# Patient Record
Sex: FEMALE | Race: WHITE | NOT HISPANIC OR LATINO | Employment: OTHER | URBAN - NONMETROPOLITAN AREA
[De-identification: names, ages, dates, MRNs, and addresses within clinical notes are randomized per-mention and may not be internally consistent; named-entity substitution may affect disease eponyms.]

---

## 2020-02-17 ENCOUNTER — TRANSCRIBE ORDERS (OUTPATIENT)
Dept: PHYSICAL THERAPY | Facility: HOSPITAL | Age: 50
End: 2020-02-17

## 2020-02-17 DIAGNOSIS — M79.7 FIBROMYALGIA: Primary | ICD-10-CM

## 2020-02-19 ENCOUNTER — APPOINTMENT (OUTPATIENT)
Dept: PHYSICAL THERAPY | Facility: HOSPITAL | Age: 50
End: 2020-02-19

## 2020-03-04 ENCOUNTER — TRANSCRIBE ORDERS (OUTPATIENT)
Dept: PHYSICAL THERAPY | Facility: HOSPITAL | Age: 50
End: 2020-03-04

## 2020-03-04 DIAGNOSIS — M79.7 MUSCLE PAIN, FIBROMYALGIA: Primary | ICD-10-CM

## 2020-08-30 ENCOUNTER — APPOINTMENT (OUTPATIENT)
Dept: GENERAL RADIOLOGY | Facility: HOSPITAL | Age: 50
End: 2020-08-30

## 2020-08-30 ENCOUNTER — HOSPITAL ENCOUNTER (OUTPATIENT)
Facility: HOSPITAL | Age: 50
Setting detail: OBSERVATION
Discharge: HOME | End: 2020-08-31
Attending: FAMILY MEDICINE | Admitting: HOSPITALIST

## 2020-08-30 DIAGNOSIS — I50.9 CONGESTIVE HEART FAILURE, UNSPECIFIED HF CHRONICITY, UNSPECIFIED HEART FAILURE TYPE (HCC): ICD-10-CM

## 2020-08-30 DIAGNOSIS — R07.2 PRECORDIAL PAIN: Primary | ICD-10-CM

## 2020-08-30 PROBLEM — R07.9 CHEST PAIN WITH HIGH RISK OF ACUTE CORONARY SYNDROME: Status: ACTIVE | Noted: 2020-08-30

## 2020-08-30 PROBLEM — F32.A DEPRESSION: Status: ACTIVE | Noted: 2020-08-30

## 2020-08-30 PROBLEM — Z95.2 H/O AORTIC VALVE REPLACEMENT: Status: ACTIVE | Noted: 2020-08-30

## 2020-08-30 PROBLEM — M79.7 FIBROMYALGIA: Status: ACTIVE | Noted: 2020-08-30

## 2020-08-30 LAB
ALBUMIN SERPL-MCNC: 4.1 G/DL (ref 3.5–5.2)
ALBUMIN/GLOB SERPL: 1.6 G/DL
ALP SERPL-CCNC: 88 U/L (ref 39–117)
ALT SERPL W P-5'-P-CCNC: 17 U/L (ref 1–33)
ANION GAP SERPL CALCULATED.3IONS-SCNC: 14 MMOL/L (ref 5–15)
AST SERPL-CCNC: 19 U/L (ref 1–32)
BASOPHILS # BLD AUTO: 0.01 10*3/MM3 (ref 0–0.2)
BASOPHILS NFR BLD AUTO: 0.2 % (ref 0–1.5)
BILIRUB SERPL-MCNC: 1 MG/DL (ref 0–1.2)
BUN SERPL-MCNC: 21 MG/DL (ref 6–20)
BUN/CREAT SERPL: 19.6 (ref 7–25)
CALCIUM SPEC-SCNC: 8.6 MG/DL (ref 8.6–10.5)
CHLORIDE SERPL-SCNC: 99 MMOL/L (ref 98–107)
CHOLEST SERPL-MCNC: 89 MG/DL (ref 0–200)
CK SERPL-CCNC: 97 U/L (ref 20–180)
CO2 SERPL-SCNC: 27 MMOL/L (ref 22–29)
CREAT SERPL-MCNC: 1.07 MG/DL (ref 0.57–1)
DEPRECATED RDW RBC AUTO: 45.8 FL (ref 37–54)
EOSINOPHIL # BLD AUTO: 0.06 10*3/MM3 (ref 0–0.4)
EOSINOPHIL NFR BLD AUTO: 1.1 % (ref 0.3–6.2)
ERYTHROCYTE [DISTWIDTH] IN BLOOD BY AUTOMATED COUNT: 13.6 % (ref 12.3–15.4)
GFR SERPL CREATININE-BSD FRML MDRD: 55 ML/MIN/1.73
GLOBULIN UR ELPH-MCNC: 2.6 GM/DL
GLUCOSE SERPL-MCNC: 115 MG/DL (ref 65–99)
HCT VFR BLD AUTO: 39.1 % (ref 34–46.6)
HDLC SERPL-MCNC: 52 MG/DL (ref 40–60)
HGB BLD-MCNC: 12.6 G/DL (ref 12–15.9)
HOLD SPECIMEN: NORMAL
HOLD SPECIMEN: NORMAL
IMM GRANULOCYTES # BLD AUTO: 0.02 10*3/MM3 (ref 0–0.05)
IMM GRANULOCYTES NFR BLD AUTO: 0.4 % (ref 0–0.5)
INR PPP: 3.92 (ref 0.8–1.2)
LDLC SERPL CALC-MCNC: 16 MG/DL (ref 0–100)
LDLC/HDLC SERPL: 0.3 {RATIO}
LIPASE SERPL-CCNC: 31 U/L (ref 13–60)
LYMPHOCYTES # BLD AUTO: 0.98 10*3/MM3 (ref 0.7–3.1)
LYMPHOCYTES NFR BLD AUTO: 18.4 % (ref 19.6–45.3)
MAGNESIUM SERPL-MCNC: 1.6 MG/DL (ref 1.6–2.6)
MAGNESIUM SERPL-MCNC: 1.7 MG/DL (ref 1.6–2.6)
MCH RBC QN AUTO: 29.8 PG (ref 26.6–33)
MCHC RBC AUTO-ENTMCNC: 32.2 G/DL (ref 31.5–35.7)
MCV RBC AUTO: 92.4 FL (ref 79–97)
MONOCYTES # BLD AUTO: 0.43 10*3/MM3 (ref 0.1–0.9)
MONOCYTES NFR BLD AUTO: 8.1 % (ref 5–12)
NEUTROPHILS NFR BLD AUTO: 3.82 10*3/MM3 (ref 1.7–7)
NEUTROPHILS NFR BLD AUTO: 71.8 % (ref 42.7–76)
NRBC BLD AUTO-RTO: 0 /100 WBC (ref 0–0.2)
NT-PROBNP SERPL-MCNC: 2413 PG/ML (ref 0–450)
PLATELET # BLD AUTO: 195 10*3/MM3 (ref 140–450)
PMV BLD AUTO: 11.4 FL (ref 6–12)
POTASSIUM SERPL-SCNC: 3.1 MMOL/L (ref 3.5–5.2)
PROT SERPL-MCNC: 6.7 G/DL (ref 6–8.5)
PROTHROMBIN TIME: 41.3 SECONDS (ref 11.1–15.3)
RBC # BLD AUTO: 4.23 10*6/MM3 (ref 3.77–5.28)
SODIUM SERPL-SCNC: 140 MMOL/L (ref 136–145)
TRIGL SERPL-MCNC: 106 MG/DL (ref 0–150)
TROPONIN T SERPL-MCNC: <0.01 NG/ML (ref 0–0.03)
VLDLC SERPL-MCNC: 21.2 MG/DL
WBC # BLD AUTO: 5.32 10*3/MM3 (ref 3.4–10.8)
WHOLE BLOOD HOLD SPECIMEN: NORMAL
WHOLE BLOOD HOLD SPECIMEN: NORMAL

## 2020-08-30 PROCEDURE — 83690 ASSAY OF LIPASE: CPT | Performed by: FAMILY MEDICINE

## 2020-08-30 PROCEDURE — 84484 ASSAY OF TROPONIN QUANT: CPT | Performed by: HOSPITALIST

## 2020-08-30 PROCEDURE — 93005 ELECTROCARDIOGRAM TRACING: CPT | Performed by: FAMILY MEDICINE

## 2020-08-30 PROCEDURE — 83735 ASSAY OF MAGNESIUM: CPT | Performed by: FAMILY MEDICINE

## 2020-08-30 PROCEDURE — G0378 HOSPITAL OBSERVATION PER HR: HCPCS

## 2020-08-30 PROCEDURE — 93010 ELECTROCARDIOGRAM REPORT: CPT | Performed by: INTERNAL MEDICINE

## 2020-08-30 PROCEDURE — 99285 EMERGENCY DEPT VISIT HI MDM: CPT

## 2020-08-30 PROCEDURE — 71045 X-RAY EXAM CHEST 1 VIEW: CPT

## 2020-08-30 PROCEDURE — 85025 COMPLETE CBC W/AUTO DIFF WBC: CPT | Performed by: FAMILY MEDICINE

## 2020-08-30 PROCEDURE — 82962 GLUCOSE BLOOD TEST: CPT

## 2020-08-30 PROCEDURE — 83880 ASSAY OF NATRIURETIC PEPTIDE: CPT | Performed by: FAMILY MEDICINE

## 2020-08-30 PROCEDURE — 80061 LIPID PANEL: CPT | Performed by: STUDENT IN AN ORGANIZED HEALTH CARE EDUCATION/TRAINING PROGRAM

## 2020-08-30 PROCEDURE — 83735 ASSAY OF MAGNESIUM: CPT | Performed by: STUDENT IN AN ORGANIZED HEALTH CARE EDUCATION/TRAINING PROGRAM

## 2020-08-30 PROCEDURE — 85610 PROTHROMBIN TIME: CPT | Performed by: FAMILY MEDICINE

## 2020-08-30 PROCEDURE — 80053 COMPREHEN METABOLIC PANEL: CPT | Performed by: FAMILY MEDICINE

## 2020-08-30 PROCEDURE — 87040 BLOOD CULTURE FOR BACTERIA: CPT | Performed by: FAMILY MEDICINE

## 2020-08-30 PROCEDURE — 82550 ASSAY OF CK (CPK): CPT | Performed by: FAMILY MEDICINE

## 2020-08-30 PROCEDURE — 84484 ASSAY OF TROPONIN QUANT: CPT | Performed by: FAMILY MEDICINE

## 2020-08-30 RX ORDER — SUCRALFATE 1 G/1
1 TABLET ORAL 4 TIMES DAILY
COMMUNITY

## 2020-08-30 RX ORDER — ASCORBIC ACID 500 MG
500 TABLET ORAL DAILY
COMMUNITY

## 2020-08-30 RX ORDER — SPIRONOLACTONE AND HYDROCHLOROTHIAZIDE 25; 25 MG/1; MG/1
1 TABLET ORAL DAILY
COMMUNITY

## 2020-08-30 RX ORDER — HYDROCHLOROTHIAZIDE 25 MG/1
25 TABLET ORAL DAILY
Status: DISCONTINUED | OUTPATIENT
Start: 2020-08-31 | End: 2020-08-31 | Stop reason: HOSPADM

## 2020-08-30 RX ORDER — PREGABALIN 25 MG/1
25 CAPSULE ORAL EVERY 12 HOURS SCHEDULED
Status: DISCONTINUED | OUTPATIENT
Start: 2020-08-30 | End: 2020-08-31 | Stop reason: HOSPADM

## 2020-08-30 RX ORDER — DIGOXIN 250 MCG
250 TABLET ORAL
Status: DISCONTINUED | OUTPATIENT
Start: 2020-08-31 | End: 2020-08-31 | Stop reason: HOSPADM

## 2020-08-30 RX ORDER — TIZANIDINE 4 MG/1
4 TABLET ORAL EVERY 8 HOURS PRN
Status: DISCONTINUED | OUTPATIENT
Start: 2020-08-30 | End: 2020-08-31 | Stop reason: HOSPADM

## 2020-08-30 RX ORDER — TIZANIDINE 4 MG/1
4 TABLET ORAL EVERY 8 HOURS PRN
COMMUNITY

## 2020-08-30 RX ORDER — ERGOCALCIFEROL (VITAMIN D2) 10 MCG
400 TABLET ORAL DAILY
Status: ON HOLD | COMMUNITY
End: 2020-08-30

## 2020-08-30 RX ORDER — DIGOXIN 0.05 MG/ML
5 SOLUTION ORAL
Status: ON HOLD | COMMUNITY
End: 2020-08-30

## 2020-08-30 RX ORDER — DULOXETIN HYDROCHLORIDE 60 MG/1
60 CAPSULE, DELAYED RELEASE ORAL 2 TIMES DAILY
Status: DISCONTINUED | OUTPATIENT
Start: 2020-08-30 | End: 2020-08-31 | Stop reason: HOSPADM

## 2020-08-30 RX ORDER — PANTOPRAZOLE SODIUM 40 MG/1
40 TABLET, DELAYED RELEASE ORAL DAILY
COMMUNITY

## 2020-08-30 RX ORDER — ONDANSETRON 4 MG/1
4 TABLET, FILM COATED ORAL EVERY 6 HOURS PRN
Status: DISCONTINUED | OUTPATIENT
Start: 2020-08-30 | End: 2020-08-31 | Stop reason: HOSPADM

## 2020-08-30 RX ORDER — SPIRONOLACTONE 25 MG/1
25 TABLET ORAL DAILY
Status: DISCONTINUED | OUTPATIENT
Start: 2020-08-31 | End: 2020-08-31 | Stop reason: HOSPADM

## 2020-08-30 RX ORDER — SUCRALFATE 1 G/1
1 TABLET ORAL 4 TIMES DAILY
Status: DISCONTINUED | OUTPATIENT
Start: 2020-08-30 | End: 2020-08-31 | Stop reason: HOSPADM

## 2020-08-30 RX ORDER — PHENOL 1.4 %
600 AEROSOL, SPRAY (ML) MUCOUS MEMBRANE DAILY
COMMUNITY

## 2020-08-30 RX ORDER — SODIUM CHLORIDE 0.9 % (FLUSH) 0.9 %
10 SYRINGE (ML) INJECTION AS NEEDED
Status: DISCONTINUED | OUTPATIENT
Start: 2020-08-30 | End: 2020-08-31 | Stop reason: HOSPADM

## 2020-08-30 RX ORDER — PREGABALIN 25 MG/1
25 CAPSULE ORAL 2 TIMES DAILY
COMMUNITY

## 2020-08-30 RX ORDER — DIGOXIN 250 MCG
250 TABLET ORAL
COMMUNITY

## 2020-08-30 RX ORDER — PANTOPRAZOLE SODIUM 40 MG/1
40 TABLET, DELAYED RELEASE ORAL DAILY
Status: DISCONTINUED | OUTPATIENT
Start: 2020-08-31 | End: 2020-08-31 | Stop reason: HOSPADM

## 2020-08-30 RX ORDER — LEVOFLOXACIN 5 MG/ML
750 INJECTION, SOLUTION INTRAVENOUS ONCE
Status: DISCONTINUED | OUTPATIENT
Start: 2020-08-30 | End: 2020-08-30

## 2020-08-30 RX ORDER — POTASSIUM CHLORIDE 750 MG/1
10 CAPSULE, EXTENDED RELEASE ORAL DAILY
Status: DISCONTINUED | OUTPATIENT
Start: 2020-08-31 | End: 2020-08-31

## 2020-08-30 RX ORDER — SODIUM CHLORIDE 0.9 % (FLUSH) 0.9 %
10 SYRINGE (ML) INJECTION EVERY 12 HOURS SCHEDULED
Status: DISCONTINUED | OUTPATIENT
Start: 2020-08-30 | End: 2020-08-31 | Stop reason: HOSPADM

## 2020-08-30 RX ORDER — DULOXETIN HYDROCHLORIDE 60 MG/1
60 CAPSULE, DELAYED RELEASE ORAL 2 TIMES DAILY
COMMUNITY

## 2020-08-30 RX ORDER — METOPROLOL SUCCINATE 25 MG/1
25 TABLET, EXTENDED RELEASE ORAL DAILY
Status: DISCONTINUED | OUTPATIENT
Start: 2020-08-31 | End: 2020-08-31 | Stop reason: HOSPADM

## 2020-08-30 RX ORDER — WARFARIN SODIUM 6 MG/1
6 TABLET ORAL
COMMUNITY

## 2020-08-30 RX ORDER — POTASSIUM CHLORIDE 750 MG/1
10 CAPSULE, EXTENDED RELEASE ORAL DAILY
COMMUNITY

## 2020-08-30 RX ORDER — METOPROLOL SUCCINATE 25 MG/1
25 TABLET, EXTENDED RELEASE ORAL DAILY
COMMUNITY

## 2020-08-30 RX ORDER — CALCIUM CARBONATE 200(500)MG
2 TABLET,CHEWABLE ORAL 2 TIMES DAILY PRN
Status: DISCONTINUED | OUTPATIENT
Start: 2020-08-30 | End: 2020-08-31 | Stop reason: HOSPADM

## 2020-08-30 RX ORDER — NITROGLYCERIN 0.4 MG/1
0.4 TABLET SUBLINGUAL
COMMUNITY

## 2020-08-30 RX ORDER — NITROGLYCERIN 0.4 MG/1
0.4 TABLET SUBLINGUAL
Status: DISCONTINUED | OUTPATIENT
Start: 2020-08-30 | End: 2020-08-31 | Stop reason: HOSPADM

## 2020-08-30 RX ORDER — LIDOCAINE 50 MG/G
1 PATCH TOPICAL EVERY 24 HOURS
COMMUNITY

## 2020-08-30 RX ORDER — ACETAMINOPHEN 325 MG/1
650 TABLET ORAL EVERY 4 HOURS PRN
Status: DISCONTINUED | OUTPATIENT
Start: 2020-08-30 | End: 2020-08-31 | Stop reason: HOSPADM

## 2020-08-30 RX ADMIN — DULOXETINE HYDROCHLORIDE 60 MG: 60 CAPSULE, DELAYED RELEASE ORAL at 22:10

## 2020-08-30 RX ADMIN — SUCRALFATE 1 G: 1 TABLET ORAL at 22:11

## 2020-08-30 RX ADMIN — SODIUM CHLORIDE, PRESERVATIVE FREE 10 ML: 5 INJECTION INTRAVENOUS at 22:11

## 2020-08-30 RX ADMIN — PREGABALIN 25 MG: 25 CAPSULE ORAL at 22:11

## 2020-08-31 ENCOUNTER — APPOINTMENT (OUTPATIENT)
Dept: CT IMAGING | Facility: HOSPITAL | Age: 50
End: 2020-08-31

## 2020-08-31 VITALS
WEIGHT: 199.6 LBS | TEMPERATURE: 97.2 F | RESPIRATION RATE: 16 BRPM | HEIGHT: 63 IN | BODY MASS INDEX: 35.37 KG/M2 | OXYGEN SATURATION: 98 % | HEART RATE: 62 BPM | SYSTOLIC BLOOD PRESSURE: 127 MMHG | DIASTOLIC BLOOD PRESSURE: 72 MMHG

## 2020-08-31 PROBLEM — E87.6 HYPOKALEMIA: Status: ACTIVE | Noted: 2020-08-31

## 2020-08-31 PROBLEM — R07.9 CHEST PAIN WITH HIGH RISK OF ACUTE CORONARY SYNDROME: Status: RESOLVED | Noted: 2020-08-30 | Resolved: 2020-08-31

## 2020-08-31 PROBLEM — E87.6 HYPOKALEMIA: Status: RESOLVED | Noted: 2020-08-31 | Resolved: 2020-08-31

## 2020-08-31 LAB
ALBUMIN SERPL-MCNC: 3.8 G/DL (ref 3.5–5.2)
ALBUMIN/GLOB SERPL: 1.4 G/DL
ALP SERPL-CCNC: 85 U/L (ref 39–117)
ALT SERPL W P-5'-P-CCNC: 15 U/L (ref 1–33)
ANION GAP SERPL CALCULATED.3IONS-SCNC: 11 MMOL/L (ref 5–15)
ANION GAP SERPL CALCULATED.3IONS-SCNC: 12 MMOL/L (ref 5–15)
AST SERPL-CCNC: 16 U/L (ref 1–32)
BASOPHILS # BLD AUTO: 0.01 10*3/MM3 (ref 0–0.2)
BASOPHILS NFR BLD AUTO: 0.2 % (ref 0–1.5)
BILIRUB SERPL-MCNC: 0.9 MG/DL (ref 0–1.2)
BUN SERPL-MCNC: 16 MG/DL (ref 6–20)
BUN SERPL-MCNC: 19 MG/DL (ref 6–20)
BUN/CREAT SERPL: 17.8 (ref 7–25)
BUN/CREAT SERPL: 22.1 (ref 7–25)
CALCIUM SPEC-SCNC: 9.1 MG/DL (ref 8.6–10.5)
CALCIUM SPEC-SCNC: 9.8 MG/DL (ref 8.6–10.5)
CHLORIDE SERPL-SCNC: 101 MMOL/L (ref 98–107)
CHLORIDE SERPL-SCNC: 105 MMOL/L (ref 98–107)
CO2 SERPL-SCNC: 27 MMOL/L (ref 22–29)
CO2 SERPL-SCNC: 27 MMOL/L (ref 22–29)
CREAT SERPL-MCNC: 0.86 MG/DL (ref 0.57–1)
CREAT SERPL-MCNC: 0.9 MG/DL (ref 0.57–1)
DEPRECATED RDW RBC AUTO: 46 FL (ref 37–54)
EOSINOPHIL # BLD AUTO: 0.06 10*3/MM3 (ref 0–0.4)
EOSINOPHIL NFR BLD AUTO: 1.1 % (ref 0.3–6.2)
ERYTHROCYTE [DISTWIDTH] IN BLOOD BY AUTOMATED COUNT: 13.5 % (ref 12.3–15.4)
GFR SERPL CREATININE-BSD FRML MDRD: 67 ML/MIN/1.73
GFR SERPL CREATININE-BSD FRML MDRD: 70 ML/MIN/1.73
GLOBULIN UR ELPH-MCNC: 2.7 GM/DL
GLUCOSE BLDC GLUCOMTR-MCNC: 126 MG/DL (ref 70–130)
GLUCOSE BLDC GLUCOMTR-MCNC: 90 MG/DL (ref 70–130)
GLUCOSE SERPL-MCNC: 53 MG/DL (ref 65–99)
GLUCOSE SERPL-MCNC: 93 MG/DL (ref 65–99)
HCT VFR BLD AUTO: 39 % (ref 34–46.6)
HGB BLD-MCNC: 12.5 G/DL (ref 12–15.9)
IMM GRANULOCYTES # BLD AUTO: 0.01 10*3/MM3 (ref 0–0.05)
IMM GRANULOCYTES NFR BLD AUTO: 0.2 % (ref 0–0.5)
LYMPHOCYTES # BLD AUTO: 1.48 10*3/MM3 (ref 0.7–3.1)
LYMPHOCYTES NFR BLD AUTO: 26.7 % (ref 19.6–45.3)
MCH RBC QN AUTO: 29.8 PG (ref 26.6–33)
MCHC RBC AUTO-ENTMCNC: 32.1 G/DL (ref 31.5–35.7)
MCV RBC AUTO: 93.1 FL (ref 79–97)
MONOCYTES # BLD AUTO: 0.43 10*3/MM3 (ref 0.1–0.9)
MONOCYTES NFR BLD AUTO: 7.7 % (ref 5–12)
NEUTROPHILS NFR BLD AUTO: 3.56 10*3/MM3 (ref 1.7–7)
NEUTROPHILS NFR BLD AUTO: 64.1 % (ref 42.7–76)
NRBC BLD AUTO-RTO: 0 /100 WBC (ref 0–0.2)
PLATELET # BLD AUTO: 186 10*3/MM3 (ref 140–450)
PMV BLD AUTO: 11.4 FL (ref 6–12)
POTASSIUM SERPL-SCNC: 3 MMOL/L (ref 3.5–5.2)
POTASSIUM SERPL-SCNC: 3.5 MMOL/L (ref 3.5–5.2)
PROT SERPL-MCNC: 6.5 G/DL (ref 6–8.5)
RBC # BLD AUTO: 4.19 10*6/MM3 (ref 3.77–5.28)
SODIUM SERPL-SCNC: 139 MMOL/L (ref 136–145)
SODIUM SERPL-SCNC: 144 MMOL/L (ref 136–145)
TROPONIN T SERPL-MCNC: <0.01 NG/ML (ref 0–0.03)
WBC # BLD AUTO: 5.55 10*3/MM3 (ref 3.4–10.8)

## 2020-08-31 PROCEDURE — G0378 HOSPITAL OBSERVATION PER HR: HCPCS

## 2020-08-31 PROCEDURE — 0 IOPAMIDOL PER 1 ML: Performed by: STUDENT IN AN ORGANIZED HEALTH CARE EDUCATION/TRAINING PROGRAM

## 2020-08-31 PROCEDURE — 70450 CT HEAD/BRAIN W/O DYE: CPT

## 2020-08-31 PROCEDURE — 84484 ASSAY OF TROPONIN QUANT: CPT | Performed by: HOSPITALIST

## 2020-08-31 PROCEDURE — 85025 COMPLETE CBC W/AUTO DIFF WBC: CPT | Performed by: STUDENT IN AN ORGANIZED HEALTH CARE EDUCATION/TRAINING PROGRAM

## 2020-08-31 PROCEDURE — 82962 GLUCOSE BLOOD TEST: CPT

## 2020-08-31 PROCEDURE — 75574 CT ANGIO HRT W/3D IMAGE: CPT

## 2020-08-31 PROCEDURE — 99220 PR INITIAL OBSERVATION CARE/DAY 70 MINUTES: CPT | Performed by: STUDENT IN AN ORGANIZED HEALTH CARE EDUCATION/TRAINING PROGRAM

## 2020-08-31 PROCEDURE — 80053 COMPREHEN METABOLIC PANEL: CPT | Performed by: STUDENT IN AN ORGANIZED HEALTH CARE EDUCATION/TRAINING PROGRAM

## 2020-08-31 RX ORDER — POTASSIUM CHLORIDE 750 MG/1
40 CAPSULE, EXTENDED RELEASE ORAL 2 TIMES DAILY WITH MEALS
Status: DISCONTINUED | OUTPATIENT
Start: 2020-08-31 | End: 2020-08-31

## 2020-08-31 RX ORDER — POTASSIUM CHLORIDE 750 MG/1
40 CAPSULE, EXTENDED RELEASE ORAL 2 TIMES DAILY WITH MEALS
Status: DISCONTINUED | OUTPATIENT
Start: 2020-08-31 | End: 2020-08-31 | Stop reason: HOSPADM

## 2020-08-31 RX ADMIN — DIGOXIN 250 MCG: 250 TABLET ORAL at 13:44

## 2020-08-31 RX ADMIN — PANTOPRAZOLE SODIUM 40 MG: 40 TABLET, DELAYED RELEASE ORAL at 09:31

## 2020-08-31 RX ADMIN — SODIUM CHLORIDE, PRESERVATIVE FREE 10 ML: 5 INJECTION INTRAVENOUS at 09:31

## 2020-08-31 RX ADMIN — PREGABALIN 25 MG: 25 CAPSULE ORAL at 09:31

## 2020-08-31 RX ADMIN — SUCRALFATE 1 G: 1 TABLET ORAL at 17:16

## 2020-08-31 RX ADMIN — DULOXETINE HYDROCHLORIDE 60 MG: 60 CAPSULE, DELAYED RELEASE ORAL at 09:31

## 2020-08-31 RX ADMIN — SUCRALFATE 1 G: 1 TABLET ORAL at 13:44

## 2020-08-31 RX ADMIN — SUCRALFATE 1 G: 1 TABLET ORAL at 09:30

## 2020-08-31 RX ADMIN — IOPAMIDOL 90 ML: 755 INJECTION, SOLUTION INTRAVENOUS at 09:17

## 2020-08-31 RX ADMIN — POTASSIUM CHLORIDE 40 MEQ: 10 CAPSULE, COATED, EXTENDED RELEASE ORAL at 17:16

## 2020-08-31 RX ADMIN — METOPROLOL SUCCINATE 25 MG: 25 TABLET, FILM COATED, EXTENDED RELEASE ORAL at 09:31

## 2020-08-31 RX ADMIN — POTASSIUM CHLORIDE 10 MEQ: 10 CAPSULE, COATED, EXTENDED RELEASE ORAL at 09:31

## 2020-08-31 RX ADMIN — SPIRONOLACTONE 25 MG: 25 TABLET ORAL at 09:31

## 2020-08-31 RX ADMIN — HYDROCHLOROTHIAZIDE 25 MG: 25 TABLET ORAL at 09:31

## 2020-09-01 ENCOUNTER — READMISSION MANAGEMENT (OUTPATIENT)
Dept: CALL CENTER | Facility: HOSPITAL | Age: 50
End: 2020-09-01

## 2020-09-01 ENCOUNTER — PATIENT OUTREACH (OUTPATIENT)
Dept: FAMILY MEDICINE CLINIC | Facility: CLINIC | Age: 50
End: 2020-09-01

## 2020-09-01 NOTE — DISCHARGE SUMMARY
DISCHARGE SUMMARY    PATIENT NAME: Wing Hickman       PHYSICIAN: William Alfaro MD  : 1970  MRN: [id number]    ADMITTED: 2020     DISCHARGED: 2020     ADMISSION DIAGNOSES:  Active Hospital Problems    Diagnosis  POA   • Congestive heart failure (CHF) (CMS/HCC) [I50.9]  Yes   • Fibromyalgia [M79.7]  Yes   • H/O aortic valve replacement [Z95.2]  Not Applicable   • Depression [F32.9]  Yes      Resolved Hospital Problems    Diagnosis Date Resolved POA   • **Chest pain with high risk of acute coronary syndrome [R07.9] 2020 Yes   • Hypokalemia [E87.6] 2020 Yes     DISCHARGE DIAGNOSES:   Active Hospital Problems    Diagnosis  POA   • Congestive heart failure (CHF) (CMS/HCC) [I50.9]  Yes   • Fibromyalgia [M79.7]  Yes   • H/O aortic valve replacement [Z95.2]  Not Applicable   • Depression [F32.9]  Yes      Resolved Hospital Problems    Diagnosis Date Resolved POA   • **Chest pain with high risk of acute coronary syndrome [R07.9] 2020 Yes   • Hypokalemia [E87.6] 2020 Yes       SERVICE: Family Medicine Residency  Attending: Senthil Melendez MD  Resident: William Alfaro MD    CONSULTS:   Consult Orders (all) (From admission, onward)     Start     Ordered    20  Inpatient Consult to Advance Care Planning  Once     Provider:  (Not yet assigned)    20  Family Practice - Resident (on-call MD unless specified)  Once,   Status:  Canceled     Specialty:  Family Medicine  Provider:  Anastasia Barone MD    20                PROCEDURES:   None     HISTORY OF PRESENT ILLNESS:   Taken from Dr. Barone's H&P 2020     Patient is a 50 y.o. female presented with a CMH of chf, depression, aortic valve replacement, tricuspid valve replacement, h/o gastric bypass surgery who presents complaining of chest pain. Patient was cooking this morning when her chest started hurting. She states it was like someone was stabbing her. She states the  pain  Radiated to her neck, face, left shoulder. Associated symptoms include nausea and shortness of air. Patient took a nitro and it didn't help. Patient was given nitro in the ambulance and It did help with the pain. Patient states she still has that pressure like pain. She states its a 4/10. Has not had pain like this before.        DIAGNOSTIC DATA:   Lab Results (last 48 hours)     Procedure Component Value Units Date/Time    Basic Metabolic Panel [[id number]]  (Abnormal) Collected:  08/31/20 1515    Specimen:  Blood Updated:  08/31/20 1550     Glucose 53 mg/dL      BUN 16 mg/dL      Creatinine 0.90 mg/dL      Sodium 139 mmol/L      Potassium 3.5 mmol/L      Chloride 101 mmol/L      CO2 27.0 mmol/L      Calcium 9.8 mg/dL      eGFR Non African Amer 67 mL/min/1.73      BUN/Creatinine Ratio 17.8     Anion Gap 11.0 mmol/L     Narrative:       GFR Normal >60  Chronic Kidney Disease <60  Kidney Failure <15      POC Glucose Once [[id number]]  (Normal) Collected:  08/31/20 0602    Specimen:  Blood Updated:  08/31/20 0650     Glucose 90 mg/dL      Comment: : [id number] JIGAR CRAIGMeter ID: [id number]       Troponin [[id number]]  (Normal) Collected:  08/31/20 0150    Specimen:  Blood Updated:  08/31/20 0243     Troponin T <0.010 ng/mL     Narrative:       Troponin T Reference Range:  <= 0.03 ng/mL-   Negative for AMI  >0.03 ng/mL-     Abnormal for myocardial necrosis.  Clinicians would have to utilize clinical acumen, EKG, Troponin and serial changes to determine if it is an Acute Myocardial Infarction or myocardial injury due to an underlying chronic condition.       Results may be falsely decreased if patient taking Biotin.      Comprehensive Metabolic Panel [[id number]]  (Abnormal) Collected:  08/31/20 0150    Specimen:  Blood Updated:  08/31/20 0227     Glucose 93 mg/dL      BUN 19 mg/dL      Creatinine 0.86 mg/dL      Sodium 144 mmol/L      Potassium 3.0 mmol/L      Chloride 105 mmol/L      CO2 27.0  mmol/L      Calcium 9.1 mg/dL      Total Protein 6.5 g/dL      Albumin 3.80 g/dL      ALT (SGPT) 15 U/L      AST (SGOT) 16 U/L      Alkaline Phosphatase 85 U/L      Total Bilirubin 0.9 mg/dL      eGFR Non African Amer 70 mL/min/1.73      Globulin 2.7 gm/dL      A/G Ratio 1.4 g/dL      BUN/Creatinine Ratio 22.1     Anion Gap 12.0 mmol/L     Narrative:       GFR Normal >60  Chronic Kidney Disease <60  Kidney Failure <15      CBC & Differential [[id number]] Collected:  08/31/20 0150    Specimen:  Blood Updated:  08/31/20 0204    Narrative:       The following orders were created for panel order CBC & Differential.  Procedure                               Abnormality         Status                     ---------                               -----------         ------                     CBC Auto Differential[[id number]]        Normal              Final result                 Please view results for these tests on the individual orders.    CBC Auto Differential [[id number]]  (Normal) Collected:  08/31/20 0150    Specimen:  Blood Updated:  08/31/20 0204     WBC 5.55 10*3/mm3      RBC 4.19 10*6/mm3      Hemoglobin 12.5 g/dL      Hematocrit 39.0 %      MCV 93.1 fL      MCH 29.8 pg      MCHC 32.1 g/dL      RDW 13.5 %      RDW-SD 46.0 fl      MPV 11.4 fL      Platelets 186 10*3/mm3      Neutrophil % 64.1 %      Lymphocyte % 26.7 %      Monocyte % 7.7 %      Eosinophil % 1.1 %      Basophil % 0.2 %      Immature Grans % 0.2 %      Neutrophils, Absolute 3.56 10*3/mm3      Lymphocytes, Absolute 1.48 10*3/mm3      Monocytes, Absolute 0.43 10*3/mm3      Eosinophils, Absolute 0.06 10*3/mm3      Basophils, Absolute 0.01 10*3/mm3      Immature Grans, Absolute 0.01 10*3/mm3      nRBC 0.0 /100 WBC     POC Glucose Once [[id number]]  (Normal) Collected:  08/30/20 2027    Specimen:  Blood Updated:  08/31/20 0050     Glucose 126 mg/dL      Comment: RN NotifiedOperator: [id number] JIGAR CRAIGMeter ID: [id number]       Troponin  [[id number]]  (Normal) Collected:  08/30/20 2301    Specimen:  Blood Updated:  08/30/20 2325     Troponin T <0.010 ng/mL     Narrative:       Troponin T Reference Range:  <= 0.03 ng/mL-   Negative for AMI  >0.03 ng/mL-     Abnormal for myocardial necrosis.  Clinicians would have to utilize clinical acumen, EKG, Troponin and serial changes to determine if it is an Acute Myocardial Infarction or myocardial injury due to an underlying chronic condition.       Results may be falsely decreased if patient taking Biotin.      Lipid Panel [[id number]] Collected:  08/30/20 1933    Specimen:  Blood Updated:  08/30/20 2135     Total Cholesterol 89 mg/dL      Triglycerides 106 mg/dL      HDL Cholesterol 52 mg/dL      LDL Cholesterol  16 mg/dL      VLDL Cholesterol 21.2 mg/dL      LDL/HDL Ratio 0.30    Narrative:       Cholesterol Reference Ranges  (U.S. Department of Health and Human Services ATP III Classifications)    Desirable          <200 mg/dL  Borderline High    200-239 mg/dL  High Risk          >240 mg/dL      Triglyceride Reference Ranges  (U.S. Department of Health and Human Services ATP III Classifications)    Normal           <150 mg/dL  Borderline High  150-199 mg/dL  High             200-499 mg/dL  Very High        >500 mg/dL    HDL Reference Ranges  (U.S. Department of Health and Human Services ATP III Classifcations)    Low     <40 mg/dl (major risk factor for CHD)  High    >60 mg/dl ('negative' risk factor for CHD)        LDL Reference Ranges  (U.S. Department of Health and Human Services ATP III Classifcations)    Optimal          <100 mg/dL  Near Optimal     100-129 mg/dL  Borderline High  130-159 mg/dL  High             160-189 mg/dL  Very High        >189 mg/dL    Magnesium [[id number]]  (Normal) Collected:  08/30/20 1933    Specimen:  Blood Updated:  08/30/20 2135     Magnesium 1.7 mg/dL     Troponin [[id number]]  (Normal) Collected:  08/30/20 1933    Specimen:  Blood Updated:  08/30/20 1952     Troponin T  <0.010 ng/mL     Narrative:       Troponin T Reference Range:  <= 0.03 ng/mL-   Negative for AMI  >0.03 ng/mL-     Abnormal for myocardial necrosis.  Clinicians would have to utilize clinical acumen, EKG, Troponin and serial changes to determine if it is an Acute Myocardial Infarction or myocardial injury due to an underlying chronic condition.       Results may be falsely decreased if patient taking Biotin.      [place] Draw [[id number]] Collected:  08/30/20 1754    Specimen:  Blood Updated:  08/30/20 1900    Narrative:       The following orders were created for panel order [place] Draw.  Procedure                               Abnormality         Status                     ---------                               -----------         ------                     Light Blue Top[[id number]]                                   Final result               Green Top (Gel)[[id number]]                                  Final result               Lavender Top[[id number]]                                     Final result               Gold Top - SST[[id number]]                                   Final result                 Please view results for these tests on the individual orders.    Light Blue Top [[id number]] Collected:  08/30/20 1754    Specimen:  Blood Updated:  08/30/20 1900     Extra Tube hold for add-on     Comment: Auto resulted       Green Top (Gel) [[id number]] Collected:  08/30/20 1754    Specimen:  Blood Updated:  08/30/20 1900     Extra Tube Hold for add-ons.     Comment: Auto resulted.       Lavender Top [[id number]] Collected:  08/30/20 1754    Specimen:  Blood Updated:  08/30/20 1900     Extra Tube hold for add-on     Comment: Auto resulted       Gold Top - SST [[id number]] Collected:  08/30/20 1754    Specimen:  Blood Updated:  08/30/20 1900     Extra Tube Hold for add-ons.     Comment: Auto resulted.       Protime-INR [[id number]]  (Abnormal) Collected:  08/30/20 1754    Specimen:  Blood Updated:  08/30/20 1858      Protime 41.3 Seconds      INR 3.92    Narrative:       Therapeutic range for most indications is 2.0-3.0 INR,  or 2.5-3.5 for mechanical heart valves.    Lipase [[id number]]  (Normal) Collected:  08/30/20 1754    Specimen:  Blood Updated:  08/30/20 1828     Lipase 31 U/L     CK [[id number]]  (Normal) Collected:  08/30/20 1754    Specimen:  Blood Updated:  08/30/20 1828     Creatine Kinase 97 U/L     Magnesium [[id number]]  (Normal) Collected:  08/30/20 1754    Specimen:  Blood Updated:  08/30/20 1828     Magnesium 1.6 mg/dL     Comprehensive Metabolic Panel [[id number]]  (Abnormal) Collected:  08/30/20 1754    Specimen:  Blood Updated:  08/30/20 1818     Glucose 115 mg/dL      BUN 21 mg/dL      Creatinine 1.07 mg/dL      Sodium 140 mmol/L      Potassium 3.1 mmol/L      Chloride 99 mmol/L      CO2 27.0 mmol/L      Calcium 8.6 mg/dL      Total Protein 6.7 g/dL      Albumin 4.10 g/dL      ALT (SGPT) 17 U/L      AST (SGOT) 19 U/L      Alkaline Phosphatase 88 U/L      Total Bilirubin 1.0 mg/dL      eGFR Non African Amer 55 mL/min/1.73      Globulin 2.6 gm/dL      A/G Ratio 1.6 g/dL      BUN/Creatinine Ratio 19.6     Anion Gap 14.0 mmol/L     Narrative:       GFR Normal >60  Chronic Kidney Disease <60  Kidney Failure <15      Troponin [[id number]]  (Normal) Collected:  08/30/20 1754    Specimen:  Blood Updated:  08/30/20 1817     Troponin T <0.010 ng/mL     Narrative:       Troponin T Reference Range:  <= 0.03 ng/mL-   Negative for AMI  >0.03 ng/mL-     Abnormal for myocardial necrosis.  Clinicians would have to utilize clinical acumen, EKG, Troponin and serial changes to determine if it is an Acute Myocardial Infarction or myocardial injury due to an underlying chronic condition.       Results may be falsely decreased if patient taking Biotin.      BNP [[id number]]  (Abnormal) Collected:  08/30/20 1754    Specimen:  Blood Updated:  08/30/20 1816     proBNP 2,413.0 pg/mL     Narrative:       Among patients with dyspnea,  NT-proBNP is highly sensitive for the detection of acute congestive heart failure. In addition NT-proBNP of <300 pg/ml effectively rules out acute congestive heart failure with 99% negative predictive value.    Results may be falsely decreased if patient taking Biotin.      CBC & Differential [[id number]] Collected:  08/30/20 1754    Specimen:  Blood Updated:  08/30/20 1759    Narrative:       The following orders were created for panel order CBC & Differential.  Procedure                               Abnormality         Status                     ---------                               -----------         ------                     CBC Auto Differential[[id number]]        Abnormal            Final result                 Please view results for these tests on the individual orders.    CBC Auto Differential [[id number]]  (Abnormal) Collected:  08/30/20 1754    Specimen:  Blood Updated:  08/30/20 1759     WBC 5.32 10*3/mm3      RBC 4.23 10*6/mm3      Hemoglobin 12.6 g/dL      Hematocrit 39.1 %      MCV 92.4 fL      MCH 29.8 pg      MCHC 32.2 g/dL      RDW 13.6 %      RDW-SD 45.8 fl      MPV 11.4 fL      Platelets 195 10*3/mm3      Neutrophil % 71.8 %      Lymphocyte % 18.4 %      Monocyte % 8.1 %      Eosinophil % 1.1 %      Basophil % 0.2 %      Immature Grans % 0.4 %      Neutrophils, Absolute 3.82 10*3/mm3      Lymphocytes, Absolute 0.98 10*3/mm3      Monocytes, Absolute 0.43 10*3/mm3      Eosinophils, Absolute 0.06 10*3/mm3      Basophils, Absolute 0.01 10*3/mm3      Immature Grans, Absolute 0.02 10*3/mm3      nRBC 0.0 /100 WBC         Ct Head Without Contrast    Result Date: 8/31/2020  Normal CT of the head. Electronically signed by:  Julien Woods MD  8/31/2020 9:18 AM CDT Workstation: [id number]    Xr Chest 1 View    Result Date: 8/30/2020  No acute abnormality identified. Electronically signed by:  Katelyn Storey MD  8/30/2020 6:19 PM CDT Workstation: [id number]    Ct Angiogram Coronary    Result Date:  8/31/2020  CONCLUSION: 1.  Limited exam. The distal aspects of the LAD and RCA are poorly visualized due to motion artifact. 2.  There is probably myocardial bridging in the mid to distal LAD. No obvious coronary artery stenosis. 3.  Status post aortic valve replacement. 4.  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 5.  There again appears to be narrowing of the left brachiocephalic vein probably due to the presence of pacer wires. 6.  Small to moderate size hiatal hernia. Electronically signed by:  Carlos Toribio MD  8/31/2020 2:14 PM CDT Workstation: [id number]         HOSPITAL COURSE:  Patient was admitted for chest pain with high risk of acute coronary syndrome. She had negative trops x 3 and her EKG did not show any evidence of ST-T wave elevations. Patient was placed on tele-monitor. Her most recent CTA done in March 2020 showed normal coronary arteries with tortuosity of the left main, otherwise normal. Given the patient has extensive cardiac history of tricuspid and aortic valve replacement and unstable chest pain, CTA was done which was unchanged from previous scan. She has a pacemaker. During the rounds patient complained of left sided facial numbness without any significant weakness. Patient is on Warfarin, thus CT head without contrast was ordered to rule out any hemorraghic bleed in brain for the cause of facial numbness and it was normal. Patient has history of gastric sleeve surgery. She had potassium of 3.0 and was started on 40 mEq potassium and sent home with the prescription. Patient denied any recurrent episodes of chest pain during the hospital stay and was discharged in stable condition. She was advised to follow up with her cardiologist as out patient in few weeks.       DISCHARGE CONDITION:   Stable     DISPOSITION:  Home or Self Care    DISCHARGE MEDICATIONS     Discharge Medications      Continue These Medications      Instructions Start  Date   calcium carbonate 600 MG tablet  Commonly known as:  OS-KADEN   600 mg, Oral, Daily      diclofenac 1 % gel gel  Commonly known as:  VOLTAREN   2 g, Topical, 4 Times Daily PRN      digoxin 250 MCG tablet  Commonly known as:  LANOXIN   250 mcg, Oral, Daily Digoxin      DULoxetine 60 MG capsule  Commonly known as:  CYMBALTA   60 mg, Oral, 2 Times Daily      lidocaine 5 %  Commonly known as:  LIDODERM   1 patch, Transdermal, Every 24 Hours, Remove & Discard patch within 12 hours or as directed by MD       metoprolol succinate XL 25 MG 24 hr tablet  Commonly known as:  TOPROL-XL   25 mg, Oral, Daily      nitroglycerin 0.4 MG SL tablet  Commonly known as:  NITROSTAT   0.4 mg, Sublingual, Every 5 Minutes PRN, Take no more than 3 doses in 15 minutes.       pantoprazole 40 MG EC tablet  Commonly known as:  PROTONIX   40 mg, Oral, Daily      potassium chloride 10 MEQ CR capsule  Commonly known as:  MICRO-K   10 mEq, Oral, Daily      pregabalin 25 MG capsule  Commonly known as:  LYRICA   25 mg, Oral, 2 Times Daily      spironolactone-hydrochlorothiazide 25-25 MG tablet  Commonly known as:  ALDACTAZIDE   1 tablet, Oral, Daily      sucralfate 1 g tablet  Commonly known as:  CARAFATE   1 g, Oral, 4 Times Daily      tiZANidine 4 MG tablet  Commonly known as:  ZANAFLEX   4 mg, Oral, Every 8 Hours PRN      vitamin C 500 MG tablet  Commonly known as:  ASCORBIC ACID   500 mg, Oral, Daily      vitamin D3 125 MCG (5000 UT) capsule capsule   2,000 Units, Oral, Daily      warfarin 6 MG tablet  Commonly known as:  COUMADIN   6 mg, Oral, Daily Warfarin, 3 mg on Tuesday, Thursday, and Saturday              INSTRUCTIONS:  Activity:   Activity Instructions     Activity as Tolerated          Diet:   Diet Instructions     Diet: Cardiac      Discharge Diet:  Cardiac          FOLLOW UP:   Additional Instructions for the Follow-ups that You Need to Schedule     Call MD With Problems / Concerns   As directed      Instructions: chest pain,  diaphoresis    Order Comments:  Instructions: chest pain, diaphoresis          Discharge Follow-up with PCP   As directed       Currently Documented PCP:    Leanne Sarmiento APRN    PCP Phone Number:    [phone]     Follow Up Details:  1 week         Discharge Follow-up with Specified Provider: [facility]; 1 Month   As directed      To:  [facility]    Follow Up:  1 Month           Follow-up Information     Leanne Sarmiento APRN Follow up.    Specialty:  Nurse Practitioner  Why:  1 week  office will call you with appt time  Contact information:  [address]  [facility] [address] [phone]             Eder Teran MD Follow up.    Specialty:  Cardiology  Why:  office will call you with appt time  Contact information:  [address] [facility] [address] [phone]             Leanne Sarmiento APRN .    Specialty:  Nurse Practitioner  Contact information:  [address]  [facility] [address] [phone]                   PENDING TEST RESULTS AT DISCHARGE   Order Current Status    Blood Culture - Blood, Arm, Left Preliminary result    Blood Culture - Blood, Arm, Right Preliminary result          Time: >30 minutes was spent in discharge planning, medication reconciliation and coordination of care for this patient.    Senthil Melendez MD is the attending at time of discharge, He is aware of the patient's status and agrees with the above discharge summary.        William Alfaro MD PGY-1  [facility] Family Medicine Residency   This document has been electronically signed by William Alfaro MD on September 1, 2020 06:53

## 2020-09-01 NOTE — OUTREACH NOTE
Prep Survey      Responses   [facility] facility patient discharged from?  [place]   Is LACE score < 7 ?  Yes   Eligibility  Readm Mgmt   Discharge diagnosis  Chest pain, CHF, Fibromyalgia, hx AVR, depression   COVID-19 Test Status  Not tested   Does the patient have one of the following disease processes/diagnoses(primary or secondary)?  CHF   Does the patient have Home health ordered?  No   Is there a DME ordered?  No   Comments regarding appointments  Pt will be notified   Medication alerts for this patient  no changes   Prep survey completed?  Yes          Charlee Daley RN

## 2020-09-03 ENCOUNTER — PATIENT OUTREACH (OUTPATIENT)
Dept: PHARMACY | Facility: HOSPITAL | Age: 50
End: 2020-09-03

## 2020-09-03 NOTE — OUTREACH NOTE
Contacted patient regarding recent discharge from hospital. Was able to follow up and assess medication management. Patient stated she was able to  all medications, has not missed any doses, and did not need any refills. She has an appointment scheduled for Tuesday with DESIREE Sarmiento.

## 2020-09-04 LAB
BACTERIA SPEC AEROBE CULT: NORMAL
BACTERIA SPEC AEROBE CULT: NORMAL

## 2020-09-08 ENCOUNTER — READMISSION MANAGEMENT (OUTPATIENT)
Dept: CALL CENTER | Facility: HOSPITAL | Age: 50
End: 2020-09-08

## 2020-09-08 NOTE — OUTREACH NOTE
CHF Week 1 Survey      Responses   [facility] patient discharged from?  [facility]   Does the patient have one of the following disease processes/diagnoses(primary or secondary)?  CHF   CHF Week 1 attempt successful?  Yes   Call start time  1254   Call end time  1257   Discharge diagnosis  Chest pain, CHF, Fibromyalgia, hx AVR, depression   Meds reviewed with patient/caregiver?  Yes   Is the patient having any side effects they believe may be caused by any medication additions or changes?  No   Does the patient have all medications ordered at discharge?  Yes   Is the patient taking all medications as directed (includes completed medication regime)?  Yes   Does the patient have a primary care provider?   Yes   Does the patient have an appointment with their PCP within 7 days of discharge?  Yes   Has the patient kept scheduled appointments due by today?  Yes   Has home health visited the patient within 72 hours of discharge?  N/A   Pulse Ox monitoring  None   Psychosocial issues?  No   Did the patient receive a copy of their discharge instructions?  Yes   Nursing interventions  Reviewed instructions with patient   What is the patient's perception of their health status since discharge?  Improving   Nursing interventions  Nurse provided patient education   Is the patient weighing daily?  Yes   Does the patient have scales?  Yes   Daily weight interventions  Education provided on importance of daily weight   Is the patient able to teach back Heart Failure diet management?  Yes   Is the patient able to teach back Heart Failure Zones?  Yes   Is the patient able to teach back signs and symptoms of worsening condition? (i.e. weight gain, shortness of air, etc.)  Yes   Is the patient/caregiver able to teach back the hierarchy of who to call/visit for symptoms/problems? PCP, Specialist, Home health nurse, Urgent Care, ED, 911  Yes    CHF Week 1 call completed?  Yes          Reji Ridley RN

## 2022-11-18 ENCOUNTER — HOME HEALTH ADMISSION (OUTPATIENT)
Dept: HOME HEALTH SERVICES | Facility: HOME HEALTHCARE | Age: 52
End: 2022-11-18

## 2022-11-18 ENCOUNTER — TRANSCRIBE ORDERS (OUTPATIENT)
Dept: HOME HEALTH SERVICES | Facility: HOME HEALTHCARE | Age: 52
End: 2022-11-18

## 2022-11-18 DIAGNOSIS — S22.080D CLOSED WEDGE COMPRESSION FRACTURE OF T11 VERTEBRA WITH ROUTINE HEALING, SUBSEQUENT ENCOUNTER: Primary | ICD-10-CM

## 2023-07-20 ENCOUNTER — TRANSCRIBE ORDERS (OUTPATIENT)
Dept: ORTHOPEDIC SURGERY | Facility: CLINIC | Age: 53
End: 2023-07-20
Payer: MEDICARE

## 2023-07-20 DIAGNOSIS — M51.36 DEGENERATION OF LUMBAR INTERVERTEBRAL DISC: Primary | ICD-10-CM

## 2023-08-02 ENCOUNTER — OFFICE VISIT (OUTPATIENT)
Dept: ORTHOPEDIC SURGERY | Facility: CLINIC | Age: 53
End: 2023-08-02
Payer: MEDICARE

## 2023-08-02 VITALS — BODY MASS INDEX: 32.89 KG/M2 | WEIGHT: 178.7 LBS | HEIGHT: 62 IN

## 2023-08-02 DIAGNOSIS — M41.9 SCOLIOSIS, UNSPECIFIED SCOLIOSIS TYPE, UNSPECIFIED SPINAL REGION: ICD-10-CM

## 2023-08-02 DIAGNOSIS — M43.9 COMPRESSION DEFORMITY OF VERTEBRA: ICD-10-CM

## 2023-08-02 DIAGNOSIS — M54.50 LUMBAR PAIN: Primary | ICD-10-CM
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
## 2023-08-02 RX ORDER — PRAZOSIN HYDROCHLORIDE 2 MG/1
2 CAPSULE ORAL NIGHTLY
COMMUNITY

## 2023-08-02 RX ORDER — LEVALBUTEROL TARTRATE 45 UG/1
1-2 AEROSOL, METERED ORAL EVERY 4 HOURS PRN
COMMUNITY

## 2023-08-02 RX ORDER — FUROSEMIDE 20 MG/1
20 TABLET ORAL 2 TIMES DAILY
COMMUNITY

## 2023-08-02 RX ORDER — AMIODARONE HYDROCHLORIDE 200 MG/1
200 TABLET ORAL DAILY
COMMUNITY

## 2023-08-02 RX ORDER — CLONAZEPAM 1 MG/1
1 TABLET ORAL 2 TIMES DAILY PRN
COMMUNITY

## 2023-08-02 RX ORDER — FAMOTIDINE 40 MG/1
40 TABLET, FILM COATED ORAL DAILY
COMMUNITY

## 2023-08-02 RX ORDER — HYDROXYZINE HYDROCHLORIDE 25 MG/1
25 TABLET, FILM COATED ORAL 3 TIMES DAILY PRN
COMMUNITY

## 2023-08-03 ENCOUNTER — TELEPHONE (OUTPATIENT)
Dept: ORTHOPEDIC SURGERY | Facility: CLINIC | Age: 53
End: 2023-08-03
Payer: MEDICARE

## 2023-08-21 ENCOUNTER — HOSPITAL ENCOUNTER (OUTPATIENT)
Facility: HOSPITAL | Age: 53
Setting detail: OBSERVATION
Discharge: PSYCHIATRIC OR SUBSTANCE USE FACILITY | DRG: 885 | End: 2023-08-23
Attending: EMERGENCY MEDICINE
Payer: MEDICARE

## 2023-08-21 DIAGNOSIS — E87.6 HYPOKALEMIA: ICD-10-CM

## 2023-08-21 DIAGNOSIS — R45.851 SUICIDAL IDEATION: Primary | ICD-10-CM

## 2023-08-21 DIAGNOSIS — N17.9 AKI (ACUTE KIDNEY INJURY): ICD-10-CM

## 2023-08-21 LAB
ALBUMIN SERPL-MCNC: 4.1 G/DL (ref 3.5–5.2)
ALBUMIN/GLOB SERPL: 1.1 G/DL
ALP SERPL-CCNC: 121 U/L (ref 39–117)
ALT SERPL W P-5'-P-CCNC: 10 U/L (ref 1–33)
AMPHET+METHAMPHET UR QL: POSITIVE
AMPHETAMINES UR QL: NEGATIVE
ANION GAP SERPL CALCULATED.3IONS-SCNC: 15 MMOL/L (ref 5–15)
ANION GAP SERPL CALCULATED.3IONS-SCNC: 16 MMOL/L (ref 5–15)
APAP SERPL-MCNC: <5 MCG/ML (ref 0–30)
AST SERPL-CCNC: 24 U/L (ref 1–32)
BARBITURATES UR QL SCN: NEGATIVE
BASOPHILS # BLD AUTO: 0.03 10*3/MM3 (ref 0–0.2)
BASOPHILS # BLD AUTO: 0.03 10*3/MM3 (ref 0–0.2)
BASOPHILS NFR BLD AUTO: 0.4 % (ref 0–1.5)
BASOPHILS NFR BLD AUTO: 0.6 % (ref 0–1.5)
BENZODIAZ UR QL SCN: POSITIVE
BILIRUB SERPL-MCNC: 1.3 MG/DL (ref 0–1.2)
BILIRUB UR QL STRIP: NEGATIVE
BUN SERPL-MCNC: 28 MG/DL (ref 6–20)
BUN SERPL-MCNC: 31 MG/DL (ref 6–20)
BUN/CREAT SERPL: 15.3 (ref 7–25)
BUN/CREAT SERPL: 19.6 (ref 7–25)
BUPRENORPHINE SERPL-MCNC: NEGATIVE NG/ML
CALCIUM SPEC-SCNC: 8.3 MG/DL (ref 8.6–10.5)
CALCIUM SPEC-SCNC: 9.6 MG/DL (ref 8.6–10.5)
CANNABINOIDS SERPL QL: NEGATIVE
CHLORIDE SERPL-SCNC: 104 MMOL/L (ref 98–107)
CHLORIDE SERPL-SCNC: 96 MMOL/L (ref 98–107)
CLARITY UR: CLEAR
CO2 SERPL-SCNC: 22 MMOL/L (ref 22–29)
CO2 SERPL-SCNC: 27 MMOL/L (ref 22–29)
COCAINE UR QL: NEGATIVE
COLOR UR: YELLOW
CREAT SERPL-MCNC: 1.43 MG/DL (ref 0.57–1)
CREAT SERPL-MCNC: 2.02 MG/DL (ref 0.57–1)
DEPRECATED RDW RBC AUTO: 40.4 FL (ref 37–54)
DEPRECATED RDW RBC AUTO: 42.9 FL (ref 37–54)
EGFRCR SERPLBLD CKD-EPI 2021: 29.4 ML/MIN/1.73
EGFRCR SERPLBLD CKD-EPI 2021: 44.5 ML/MIN/1.73
EOSINOPHIL # BLD AUTO: 0.14 10*3/MM3 (ref 0–0.4)
EOSINOPHIL # BLD AUTO: 0.2 10*3/MM3 (ref 0–0.4)
EOSINOPHIL NFR BLD AUTO: 2.1 % (ref 0.3–6.2)
EOSINOPHIL NFR BLD AUTO: 4.3 % (ref 0.3–6.2)
ERYTHROCYTE [DISTWIDTH] IN BLOOD BY AUTOMATED COUNT: 12.4 % (ref 12.3–15.4)
ERYTHROCYTE [DISTWIDTH] IN BLOOD BY AUTOMATED COUNT: 12.6 % (ref 12.3–15.4)
ETHANOL BLD-MCNC: <10 MG/DL (ref 0–10)
ETHANOL UR QL: <0.01 %
FENTANYL UR-MCNC: NEGATIVE NG/ML
FLUAV RNA RESP QL NAA+PROBE: NOT DETECTED
FLUBV RNA RESP QL NAA+PROBE: NOT DETECTED
GLOBULIN UR ELPH-MCNC: 3.9 GM/DL
GLUCOSE SERPL-MCNC: 102 MG/DL (ref 65–99)
GLUCOSE SERPL-MCNC: 85 MG/DL (ref 65–99)
GLUCOSE UR STRIP-MCNC: NEGATIVE MG/DL
HCT VFR BLD AUTO: 40.7 % (ref 34–46.6)
HCT VFR BLD AUTO: 43.5 % (ref 34–46.6)
HGB BLD-MCNC: 13.7 G/DL (ref 12–15.9)
HGB BLD-MCNC: 14.1 G/DL (ref 12–15.9)
HGB UR QL STRIP.AUTO: NEGATIVE
HOLD SPECIMEN: NORMAL
HOLD SPECIMEN: NORMAL
IMM GRANULOCYTES # BLD AUTO: 0.01 10*3/MM3 (ref 0–0.05)
IMM GRANULOCYTES # BLD AUTO: 0.02 10*3/MM3 (ref 0–0.05)
IMM GRANULOCYTES NFR BLD AUTO: 0.2 % (ref 0–0.5)
IMM GRANULOCYTES NFR BLD AUTO: 0.3 % (ref 0–0.5)
INR PPP: 1.87 (ref 0.8–1.2)
KETONES UR QL STRIP: NEGATIVE
LEUKOCYTE ESTERASE UR QL STRIP.AUTO: NEGATIVE
LYMPHOCYTES # BLD AUTO: 1.08 10*3/MM3 (ref 0.7–3.1)
LYMPHOCYTES # BLD AUTO: 1.26 10*3/MM3 (ref 0.7–3.1)
LYMPHOCYTES NFR BLD AUTO: 18.8 % (ref 19.6–45.3)
LYMPHOCYTES NFR BLD AUTO: 23.3 % (ref 19.6–45.3)
MAGNESIUM SERPL-MCNC: 2.3 MG/DL (ref 1.6–2.6)
MCH RBC QN AUTO: 30.5 PG (ref 26.6–33)
MCH RBC QN AUTO: 30.5 PG (ref 26.6–33)
MCHC RBC AUTO-ENTMCNC: 32.4 G/DL (ref 31.5–35.7)
MCHC RBC AUTO-ENTMCNC: 33.7 G/DL (ref 31.5–35.7)
MCV RBC AUTO: 90.6 FL (ref 79–97)
MCV RBC AUTO: 94.2 FL (ref 79–97)
METHADONE UR QL SCN: NEGATIVE
MONOCYTES # BLD AUTO: 0.35 10*3/MM3 (ref 0.1–0.9)
MONOCYTES # BLD AUTO: 0.48 10*3/MM3 (ref 0.1–0.9)
MONOCYTES NFR BLD AUTO: 7.1 % (ref 5–12)
MONOCYTES NFR BLD AUTO: 7.6 % (ref 5–12)
NEUTROPHILS NFR BLD AUTO: 2.96 10*3/MM3 (ref 1.7–7)
NEUTROPHILS NFR BLD AUTO: 4.79 10*3/MM3 (ref 1.7–7)
NEUTROPHILS NFR BLD AUTO: 64 % (ref 42.7–76)
NEUTROPHILS NFR BLD AUTO: 71.3 % (ref 42.7–76)
NITRITE UR QL STRIP: NEGATIVE
NRBC BLD AUTO-RTO: 0 /100 WBC (ref 0–0.2)
NRBC BLD AUTO-RTO: 0 /100 WBC (ref 0–0.2)
OPIATES UR QL: NEGATIVE
OXYCODONE UR QL SCN: NEGATIVE
PCP UR QL SCN: NEGATIVE
PH UR STRIP.AUTO: <=5 [PH] (ref 5–9)
PLATELET # BLD AUTO: 132 10*3/MM3 (ref 140–450)
PLATELET # BLD AUTO: 174 10*3/MM3 (ref 140–450)
PMV BLD AUTO: 11.7 FL (ref 6–12)
PMV BLD AUTO: 12.1 FL (ref 6–12)
POTASSIUM SERPL-SCNC: 3.2 MMOL/L (ref 3.5–5.2)
POTASSIUM SERPL-SCNC: 3.3 MMOL/L (ref 3.5–5.2)
POTASSIUM SERPL-SCNC: 3.3 MMOL/L (ref 3.5–5.2)
PROPOXYPH UR QL: NEGATIVE
PROT SERPL-MCNC: 8 G/DL (ref 6–8.5)
PROT UR QL STRIP: NEGATIVE
PROTHROMBIN TIME: 21.2 SECONDS (ref 11.1–15.3)
RBC # BLD AUTO: 4.49 10*6/MM3 (ref 3.77–5.28)
RBC # BLD AUTO: 4.62 10*6/MM3 (ref 3.77–5.28)
SALICYLATES SERPL-MCNC: <0.3 MG/DL
SARS-COV-2 RNA RESP QL NAA+PROBE: NOT DETECTED
SODIUM SERPL-SCNC: 139 MMOL/L (ref 136–145)
SODIUM SERPL-SCNC: 141 MMOL/L (ref 136–145)
SP GR UR STRIP: 1.01 (ref 1–1.03)
TRICYCLICS UR QL SCN: NEGATIVE
UROBILINOGEN UR QL STRIP: NORMAL
WBC NRBC COR # BLD: 4.63 10*3/MM3 (ref 3.4–10.8)
WBC NRBC COR # BLD: 6.72 10*3/MM3 (ref 3.4–10.8)

## 2023-08-21 PROCEDURE — 85025 COMPLETE CBC W/AUTO DIFF WBC: CPT | Performed by: INTERNAL MEDICINE

## 2023-08-21 PROCEDURE — 85025 COMPLETE CBC W/AUTO DIFF WBC: CPT | Performed by: EMERGENCY MEDICINE

## 2023-08-21 PROCEDURE — 80053 COMPREHEN METABOLIC PANEL: CPT | Performed by: EMERGENCY MEDICINE

## 2023-08-21 PROCEDURE — 80179 DRUG ASSAY SALICYLATE: CPT | Performed by: EMERGENCY MEDICINE

## 2023-08-21 PROCEDURE — 96365 THER/PROPH/DIAG IV INF INIT: CPT

## 2023-08-21 PROCEDURE — G0378 HOSPITAL OBSERVATION PER HR: HCPCS

## 2023-08-21 PROCEDURE — 80307 DRUG TEST PRSMV CHEM ANLYZR: CPT | Performed by: EMERGENCY MEDICINE

## 2023-08-21 PROCEDURE — 80143 DRUG ASSAY ACETAMINOPHEN: CPT | Performed by: EMERGENCY MEDICINE

## 2023-08-21 PROCEDURE — 99285 EMERGENCY DEPT VISIT HI MDM: CPT

## 2023-08-21 PROCEDURE — 84132 ASSAY OF SERUM POTASSIUM: CPT | Performed by: FAMILY MEDICINE

## 2023-08-21 PROCEDURE — 0 POTASSIUM CHLORIDE 10 MEQ/100ML SOLUTION: Performed by: EMERGENCY MEDICINE

## 2023-08-21 PROCEDURE — 85610 PROTHROMBIN TIME: CPT | Performed by: EMERGENCY MEDICINE

## 2023-08-21 PROCEDURE — 83735 ASSAY OF MAGNESIUM: CPT | Performed by: EMERGENCY MEDICINE

## 2023-08-21 PROCEDURE — 96361 HYDRATE IV INFUSION ADD-ON: CPT

## 2023-08-21 PROCEDURE — 82077 ASSAY SPEC XCP UR&BREATH IA: CPT | Performed by: EMERGENCY MEDICINE

## 2023-08-21 PROCEDURE — 93010 ELECTROCARDIOGRAM REPORT: CPT | Performed by: INTERNAL MEDICINE

## 2023-08-21 PROCEDURE — 87636 SARSCOV2 & INF A&B AMP PRB: CPT | Performed by: EMERGENCY MEDICINE

## 2023-08-21 PROCEDURE — 36415 COLL VENOUS BLD VENIPUNCTURE: CPT | Performed by: INTERNAL MEDICINE

## 2023-08-21 PROCEDURE — 93005 ELECTROCARDIOGRAM TRACING: CPT | Performed by: EMERGENCY MEDICINE

## 2023-08-21 PROCEDURE — 82746 ASSAY OF FOLIC ACID SERUM: CPT | Performed by: INTERNAL MEDICINE

## 2023-08-21 PROCEDURE — 82607 VITAMIN B-12: CPT | Performed by: INTERNAL MEDICINE

## 2023-08-21 PROCEDURE — 81003 URINALYSIS AUTO W/O SCOPE: CPT | Performed by: EMERGENCY MEDICINE

## 2023-08-21 RX ORDER — PANTOPRAZOLE SODIUM 40 MG/1
40 TABLET, DELAYED RELEASE ORAL
Status: DISCONTINUED | OUTPATIENT
Start: 2023-08-22 | End: 2023-08-23 | Stop reason: HOSPADM

## 2023-08-21 RX ORDER — BISACODYL 10 MG
10 SUPPOSITORY, RECTAL RECTAL DAILY PRN
Status: DISCONTINUED | OUTPATIENT
Start: 2023-08-21 | End: 2023-08-23 | Stop reason: HOSPADM

## 2023-08-21 RX ORDER — HYDROCHLOROTHIAZIDE 25 MG/1
25 TABLET ORAL DAILY
COMMUNITY
Start: 2023-07-11 | End: 2023-08-23 | Stop reason: HOSPADM

## 2023-08-21 RX ORDER — TEMAZEPAM 7.5 MG/1
7.5 CAPSULE ORAL NIGHTLY PRN
COMMUNITY
Start: 2023-08-09 | End: 2023-08-23 | Stop reason: HOSPADM

## 2023-08-21 RX ORDER — PREGABALIN 25 MG/1
25 CAPSULE ORAL EVERY 12 HOURS SCHEDULED
Status: DISCONTINUED | OUTPATIENT
Start: 2023-08-22 | End: 2023-08-23 | Stop reason: HOSPADM

## 2023-08-21 RX ORDER — POLYETHYLENE GLYCOL 3350 17 G/17G
17 POWDER, FOR SOLUTION ORAL DAILY PRN
Status: DISCONTINUED | OUTPATIENT
Start: 2023-08-21 | End: 2023-08-23 | Stop reason: HOSPADM

## 2023-08-21 RX ORDER — SODIUM CHLORIDE 0.9 % (FLUSH) 0.9 %
10 SYRINGE (ML) INJECTION EVERY 12 HOURS SCHEDULED
Status: DISCONTINUED | OUTPATIENT
Start: 2023-08-21 | End: 2023-08-23 | Stop reason: HOSPADM

## 2023-08-21 RX ORDER — CLONAZEPAM 0.5 MG/1
0.5 TABLET ORAL 2 TIMES DAILY PRN
Status: DISCONTINUED | OUTPATIENT
Start: 2023-08-21 | End: 2023-08-23 | Stop reason: HOSPADM

## 2023-08-21 RX ORDER — SODIUM CHLORIDE 0.9 % (FLUSH) 0.9 %
10 SYRINGE (ML) INJECTION AS NEEDED
Status: DISCONTINUED | OUTPATIENT
Start: 2023-08-21 | End: 2023-08-23 | Stop reason: HOSPADM

## 2023-08-21 RX ORDER — SODIUM CHLORIDE, SODIUM LACTATE, POTASSIUM CHLORIDE, CALCIUM CHLORIDE 600; 310; 30; 20 MG/100ML; MG/100ML; MG/100ML; MG/100ML
100 INJECTION, SOLUTION INTRAVENOUS CONTINUOUS
Status: DISCONTINUED | OUTPATIENT
Start: 2023-08-21 | End: 2023-08-23 | Stop reason: HOSPADM

## 2023-08-21 RX ORDER — AMIODARONE HYDROCHLORIDE 200 MG/1
200 TABLET ORAL DAILY
Status: DISCONTINUED | OUTPATIENT
Start: 2023-08-21 | End: 2023-08-23 | Stop reason: HOSPADM

## 2023-08-21 RX ORDER — SODIUM CHLORIDE 9 MG/ML
40 INJECTION, SOLUTION INTRAVENOUS AS NEEDED
Status: DISCONTINUED | OUTPATIENT
Start: 2023-08-21 | End: 2023-08-23 | Stop reason: HOSPADM

## 2023-08-21 RX ORDER — CALCIUM CARBONATE 500 MG/1
2 TABLET, CHEWABLE ORAL 3 TIMES DAILY PRN
Status: DISCONTINUED | OUTPATIENT
Start: 2023-08-21 | End: 2023-08-23 | Stop reason: HOSPADM

## 2023-08-21 RX ORDER — ONDANSETRON 2 MG/ML
4 INJECTION INTRAMUSCULAR; INTRAVENOUS EVERY 6 HOURS PRN
Status: DISCONTINUED | OUTPATIENT
Start: 2023-08-21 | End: 2023-08-23 | Stop reason: HOSPADM

## 2023-08-21 RX ORDER — POTASSIUM CHLORIDE 7.45 MG/ML
10 INJECTION INTRAVENOUS ONCE
Status: COMPLETED | OUTPATIENT
Start: 2023-08-21 | End: 2023-08-21

## 2023-08-21 RX ORDER — BISACODYL 5 MG/1
5 TABLET, DELAYED RELEASE ORAL DAILY PRN
Status: DISCONTINUED | OUTPATIENT
Start: 2023-08-21 | End: 2023-08-23 | Stop reason: HOSPADM

## 2023-08-21 RX ORDER — METOPROLOL SUCCINATE 50 MG/1
50 TABLET, EXTENDED RELEASE ORAL DAILY
COMMUNITY
End: 2023-08-23 | Stop reason: HOSPADM

## 2023-08-21 RX ORDER — AMOXICILLIN 250 MG
2 CAPSULE ORAL 2 TIMES DAILY
Status: DISCONTINUED | OUTPATIENT
Start: 2023-08-21 | End: 2023-08-23 | Stop reason: HOSPADM

## 2023-08-21 RX ORDER — WARFARIN SODIUM 3 MG/1
6 TABLET ORAL
Status: DISCONTINUED | OUTPATIENT
Start: 2023-08-21 | End: 2023-08-22

## 2023-08-21 RX ORDER — LANOLIN ALCOHOL/MO/W.PET/CERES
400 CREAM (GRAM) TOPICAL DAILY
COMMUNITY
Start: 2023-07-07 | End: 2023-08-23 | Stop reason: HOSPADM

## 2023-08-21 RX ORDER — AMIODARONE HYDROCHLORIDE 200 MG/1
200 TABLET ORAL DAILY
Qty: 30 TABLET | Refills: 11 | COMMUNITY
Start: 2023-06-29 | End: 2024-06-29

## 2023-08-21 RX ORDER — POTASSIUM CHLORIDE 750 MG/1
40 CAPSULE, EXTENDED RELEASE ORAL ONCE
Status: COMPLETED | OUTPATIENT
Start: 2023-08-21 | End: 2023-08-21

## 2023-08-21 RX ADMIN — SODIUM CHLORIDE 1000 ML: 9 INJECTION, SOLUTION INTRAVENOUS at 03:45

## 2023-08-21 RX ADMIN — DOCUSATE SODIUM 50 MG AND SENNOSIDES 8.6 MG 2 TABLET: 8.6; 5 TABLET, FILM COATED ORAL at 20:00

## 2023-08-21 RX ADMIN — Medication 10 ML: at 22:02

## 2023-08-21 RX ADMIN — CALCIUM CARBONATE (ANTACID) CHEW TAB 500 MG 2 TABLET: 500 CHEW TAB at 21:44

## 2023-08-21 RX ADMIN — POTASSIUM CHLORIDE 40 MEQ: 750 CAPSULE, EXTENDED RELEASE ORAL at 13:51

## 2023-08-21 RX ADMIN — WARFARIN SODIUM 6 MG: 3 TABLET ORAL at 19:58

## 2023-08-21 RX ADMIN — SODIUM CHLORIDE 1000 ML: 9 INJECTION, SOLUTION INTRAVENOUS at 13:14

## 2023-08-21 RX ADMIN — POTASSIUM CHLORIDE 10 MEQ: 7.46 INJECTION, SOLUTION INTRAVENOUS at 03:53

## 2023-08-21 RX ADMIN — SODIUM CHLORIDE, POTASSIUM CHLORIDE, SODIUM LACTATE AND CALCIUM CHLORIDE 100 ML/HR: 600; 310; 30; 20 INJECTION, SOLUTION INTRAVENOUS at 18:27

## 2023-08-21 RX ADMIN — AMIODARONE HYDROCHLORIDE 200 MG: 200 TABLET ORAL at 18:28

## 2023-08-21 NOTE — PROGRESS NOTES
"Anticoagulation by Pharmacy - Warfarin    Wing Hickman is a 53 y.o.female being continued on warfarin for atrial fibrillation    Home regimen: Warfarin 6 mg and warfarin 3 mg alternating days (per patient)  INR Goal: 2-3  Last INR:   Lab Results   Component Value Date    INR 1.87 (H) 08/21/2023       Objective:  [Ht: 157.5 cm (62\"); Wt: 80.7 kg (178 lb)]  Lab Results   Component Value Date    INR 1.87 (H) 08/21/2023    INR 1.14 08/16/2023    INR 6.55 (C) 08/10/2023    PROTIME 21.2 (H) 08/21/2023    PROTIME 11.3 08/16/2023    PROTIME 57.9 (H) 08/10/2023     Lab Results   Component Value Date    HGB 14.1 08/21/2023    HGB 13.7 08/21/2023    HGB 11.4 (L) 05/15/2022    HCT 43.5 08/21/2023    HCT 40.7 08/21/2023    HCT 36.5 (L) 05/15/2022     (L) 08/21/2023     08/21/2023     08/31/2020       Assessment  Interacting medications: amiodarone  INR is 1.87, subtherapeutic   Hemoglobin 14.1, WNL   Hematocrit 43.5, WNL   Platelets 132, low   No signs or symptoms of bleeding noted     Medication reconciliation states patient last received warfarin yesterday.     Plan:  1.  Give warfarin 6 mg tablet PO @ 1800 tonight  2.  Draw a PT/INR in AM  3.  Pharmacy will continue to follow    Pierre Amezquita RPH  08/21/23 17:58 CDT   "

## 2023-08-21 NOTE — ED NOTES
"Called [facility] to inform of patient is being admitted to [facility].  [facility] Respond to \"reach out to the clinician\" and have them call [phone] for updated information.    "

## 2023-08-21 NOTE — PLAN OF CARE
Goal Outcome Evaluation:  Plan of Care Reviewed With: patient        Progress: no change  Outcome Evaluation: Patient admitted to [facility].

## 2023-08-21 NOTE — ED NOTES
Nursing report ED to floor  Wing Hickman  53 y.o.  female    HPI:   Chief Complaint   Patient presents with    Psychiatric Evaluation       Admitting doctor:   Brock Franks MD    Consulting provider(s):  Consults       Date and Time Order Name Status Description    8/21/2023  3:54 PM Hospitalist (on-call MD unless specified)      8/21/2023  3:41 PM Hospitalist (on-call MD unless specified)               Admitting diagnosis:   The primary encounter diagnosis was Suicidal ideation. Diagnoses of ANANDA (acute kidney injury) and Hypokalemia were also pertinent to this visit.    Code status:   Current Code Status       Date Active Code Status Order ID Comments User Context       Prior            Allergies:   Ace inhibitors, Capsaicin, Nsaids, and Quetiapine    Intake and Output    Intake/Output Summary (Last 24 hours) at 8/21/2023 1559  Last data filed at 8/21/2023 0630  Gross per 24 hour   Intake 1100 ml   Output --   Net 1100 ml       Weight:       08/21/23  0108   Weight: 80.7 kg (178 lb)       Most recent vitals:   Vitals:    08/21/23 1245 08/21/23 1345 08/21/23 1430 08/21/23 1530   BP: 99/56 106/58 109/62    BP Location:       Patient Position:       Pulse: 60 60 60 60   Resp:       Temp:       TempSrc:       SpO2: 98% 99% 100% 100%   Weight:       Height:         Oxygen Therapy: Room Air    Active LDAs/IV Access:   Lines, Drains & Airways       Active LDAs       Name Placement date Placement time Site Days    Peripheral IV 08/21/23 0336 Left;Upper Arm 08/21/23  0336  Arm  less than 1                    Labs (abnormal labs have a star):   Labs Reviewed   COMPREHENSIVE METABOLIC PANEL - Abnormal; Notable for the following components:       Result Value    Glucose 102 (*)     BUN 31 (*)     Creatinine 2.02 (*)     Potassium 3.2 (*)     Chloride 96 (*)     Alkaline Phosphatase 121 (*)     Total Bilirubin 1.3 (*)     Anion Gap 16.0 (*)     eGFR 29.4 (*)     All other components within normal limits     Narrative:     GFR Normal >60  Chronic Kidney Disease <60  Kidney Failure <15     URINE DRUG SCREEN - Abnormal; Notable for the following components:    Amphetamine Screen, Urine Positive (*)     Benzodiazepine Screen, Urine Positive (*)     All other components within normal limits    Narrative:     Cutoff For Drugs Screened:    Amphetamines               500 ng/ml  Barbiturates               200 ng/ml  Benzodiazepines            150 ng/ml  Cocaine                    150 ng/ml  Methadone                  200 ng/ml  Opiates                    100 ng/ml  Phencyclidine               25 ng/ml  THC                            50 ng/ml  Methamphetamine            500 ng/ml  Tricyclic Antidepressants  300 ng/ml  Oxycodone                  100 ng/ml  Propoxyphene               300 ng/ml  Buprenorphine               10 ng/ml    The normal value for all drugs tested is negative. This report includes unconfirmed screening results, with the cutoff values listed, to be used for medical treatment purposes only.  Unconfirmed results must not be used for non-medical purposes such as employment or legal testing.  Clinical consideration should be applied to any drug of abuse test, particularly when unconfirmed results are used.     PROTIME-INR - Abnormal; Notable for the following components:    Protime 21.2 (*)     INR 1.87 (*)     All other components within normal limits    Narrative:     Therapeutic range for most indications is 2.0-3.0 INR,  or 2.5-3.5 for mechanical heart valves.   CBC WITH AUTO DIFFERENTIAL - Abnormal; Notable for the following components:    Lymphocyte % 18.8 (*)     All other components within normal limits   POTASSIUM - Abnormal; Notable for the following components:    Potassium 3.3 (*)     All other components within normal limits   BASIC METABOLIC PANEL - Abnormal; Notable for the following components:    BUN 28 (*)     Creatinine 1.43 (*)     Potassium 3.3 (*)     Calcium 8.3 (*)     eGFR 44.5 (*)      All other components within normal limits    Narrative:     GFR Normal >60  Chronic Kidney Disease <60  Kidney Failure <15     COVID-19 AND FLU A/B, NP SWAB IN TRANSPORT MEDIA 8-12 HR TAT - Normal    Narrative:     Fact sheet for providers: https://www.fda.gov/media/884162/download    Fact sheet for patients: https://www.fda.gov/media/666599/download    Test performed by PCR.   URINALYSIS W/ MICROSCOPIC IF INDICATED (NO CULTURE) - Normal    Narrative:     Urine microscopic not indicated.   ACETAMINOPHEN LEVEL - Normal   SALICYLATE LEVEL - Normal   FENTANYL, URINE - Normal    Narrative:     Negative Threshold:      Fentanyl 5 ng/mL     The normal value for the drug tested is negative. This report includes final unconfirmed screening results to be used for medical treatment purposes only. Unconfirmed results must not be used for non-medical purposes such as employment or legal testing. Clinical consideration should be applied to any drug of abuse test, particularly when unconfirmed results are used.          MAGNESIUM - Normal   ETHANOL   CBC AND DIFFERENTIAL    Narrative:     The following orders were created for panel order CBC & Differential.  Procedure                               Abnormality         Status                     ---------                               -----------         ------                     CBC Auto Differential[[id number]]        Abnormal            Final result                 Please view results for these tests on the individual orders.   EXTRA TUBES    Narrative:     The following orders were created for panel order Extra Tubes.  Procedure                               Abnormality         Status                     ---------                               -----------         ------                     Gold Top - SST[[id number]]                                   Final result               Gray Top[[id number]]                                         Final result                 Please view  results for these tests on the individual orders.   GOLD TOP - SST   GRAY TOP       Meds given in ED:   Medications   sodium chloride 0.9 % flush 10 mL (has no administration in time range)   sodium chloride 0.9 % bolus 1,000 mL (0 mL Intravenous Stopped 8/21/23 0630)   potassium chloride 10 mEq in 100 mL IVPB (0 mEq Intravenous Stopped 8/21/23 0522)   sodium chloride 0.9 % bolus 1,000 mL (1,000 mL Intravenous New Bag 8/21/23 1314)   potassium chloride (MICRO-K) CR capsule 40 mEq (40 mEq Oral Given 8/21/23 1351)           NIH Stroke Scale:       Isolation/Infection(s):  No active isolations   No active infections     COVID Testing  Collected Yes  Resulted Negative    Nursing report ED to floor:  Mental status: A&O x4  Ambulatory status: Self  Precautions: None    ED nurse phone extentsion- [phone] or [phone]

## 2023-08-21 NOTE — ED NOTES
[facility]  No news available at this time.  CN to call asap.

## 2023-08-21 NOTE — H&P
[facility] Medicine Admission      Date of Admission: 2023      Primary Care Physician: Leanne Sarmiento APRN      Chief Complaint: suicidal ideas    HPI:Patient female with history of depression, type II DM, essential hypertension, hypothyroidism came to the hospital early morning with suicidal ideas that started after her   in this hospital in , on and off; She was also diagnosed on admission with ANANDA, likely due to dehydration; she has been hydrated during her stay in ER and her Cr went down from 2 to 1.4.   She has a history of gastric bypass and after that she has not longer diabetes and hypertension  ER physician has talked to psychiatrist who advised to be medical clear before transfer the patient to psych unit  She is on warfarin due to atrial fibrillation    Concurrent Medical History:  has a past medical history of Anemia, Arthritis, CHF (congestive heart failure), Depression, Diabetes mellitus, Disease of thyroid gland, History of transfusion, Hypertension, Injury of back, Kidney stone, MDRO (multiple drug resistant organisms) resistance, Migraine, and Seizures.    Past Surgical History:  has a past surgical history that includes Aortic valve replacement (); Tricuspid valve surgery (); Hysterectomy; Gastric bypass (); Cardiac catheterization; and Breast biopsy (Right). PPM    Family History: family history is not on file.   Mom is alive 73 y/o with history of DM   Dad is alive 73 y/o with history of DM and CAD    Social History:  reports that she has never smoked. She has never used smokeless tobacco. She reports that she does not currently use alcohol. She reports that she does not currently use drugs.  POA is her Mom  She is full code  Her  just passed away on     Allergies:   Allergies   Allergen Reactions    Ace Inhibitors Other (See Comments)     Throat and Lips swelling.     Capsaicin Hives    Nsaids Unknown  - Low Severity     Due to having heart surgery and pacemaker    Quetiapine Unknown - Low Severity       Medications:   Prior to Admission medications    Medication Sig Start Date End Date Taking? Authorizing Provider   amiodarone (PACERONE) 200 MG tablet Take 1 tablet by mouth Daily.    Rosalie Ortega MD   Cariprazine HCl (Vraylar) 4.5 MG capsule capsule Take  by mouth Daily.    Rosalie Ortega MD   clonazePAM (KlonoPIN) 1 MG tablet Take 1 tablet by mouth 2 (Two) Times a Day As Needed.    Rosalie Ortega MD   famotidine (PEPCID) 40 MG tablet Take 1 tablet by mouth Daily.    Rosalie Ortega MD   furosemide (LASIX) 20 MG tablet Take 1 tablet by mouth 2 (Two) Times a Day.    Rosalie Ortega MD   hydrOXYzine (ATARAX) 25 MG tablet Take 1 tablet by mouth 3 (Three) Times a Day As Needed for Itching.    Rosalie Ortega MD   levalbuterol (XOPENEX HFA) 45 MCG/ACT inhaler Inhale 1-2 puffs Every 4 (Four) Hours As Needed for Wheezing.    Rosalie Ortega MD   lidocaine (LIDODERM) 5 % Place 1 patch on the skin as directed by provider Daily. Remove & Discard patch within 12 hours or as directed by MD    Rosalie Ortega MD   lisdexamfetamine (Vyvanse) 50 MG capsule Take 1 capsule by mouth Every Morning    Rosalie Ortega MD   nitroglycerin (NITROSTAT) 0.4 MG SL tablet Place 1 tablet under the tongue Every 5 (Five) Minutes As Needed for Chest Pain. Take no more than 3 doses in 15 minutes.    Rosalie Ortega MD   pantoprazole (PROTONIX) 40 MG EC tablet Take 1 tablet by mouth Daily.    Rosalie Ortega MD   potassium chloride (MICRO-K) 10 MEQ CR capsule Take 1 capsule by mouth Daily.    Rosalie Ortega MD   prazosin (MINIPRESS) 2 MG capsule Take 1 capsule by mouth Every Night.    Rosalie Ortega MD   pregabalin (LYRICA) 25 MG capsule Take 1 capsule by mouth 2 (Two) Times a Day.    Rosalie Ortega MD   spironolactone-hydrochlorothiazide (ALDACTAZIDE)  25-25 MG tablet Take 1 tablet by mouth Daily.    ProviderRosalie MD   tiZANidine (ZANAFLEX) 4 MG tablet Take 1 tablet by mouth Every 8 (Eight) Hours As Needed for Muscle Spasms.    Rosalie Ortega MD   warfarin (COUMADIN) 6 MG tablet Take 1 tablet by mouth Daily. 3 mg on Tuesday, Thursday, and Saturday    ProviderRosalie MD               Review of Systems:  Review of Systems   All other systems reviewed and are negative.   Otherwise complete ROS is negative except as mentioned above.    Physical Exam:   Temp:  [97.5 øF (36.4 øC)] 97.5 øF (36.4 øC)  Heart Rate:  [58-78] 60  Resp:  [16-18] 18  BP: ()/(50-80) 109/62  Physical Exam  Vitals reviewed.   Constitutional:       Comments: Crying easily    HENT:      Head: Normocephalic.      Nose: Nose normal.      Mouth/Throat:      Mouth: Mucous membranes are moist.   Eyes:      Extraocular Movements: Extraocular movements intact.   Cardiovascular:      Rate and Rhythm: Regular rhythm.      Heart sounds: Normal heart sounds.      Comments: Pacing rhythm   Pulmonary:      Effort: Pulmonary effort is normal.      Breath sounds: Normal breath sounds.   Abdominal:      General: Bowel sounds are normal.      Palpations: Abdomen is soft.   Musculoskeletal:         General: Normal range of motion.      Cervical back: Normal range of motion and neck supple.   Skin:     General: Skin is warm.   Neurological:      General: No focal deficit present.      Mental Status: She is alert. Mental status is at baseline.         Results Reviewed:  I have personally reviewed current lab, radiology, and data and agree with results.  Lab Results (last 24 hours)       Procedure Component Value Units Date/Time    Basic Metabolic Panel [[id number]]  (Abnormal) Collected: 08/21/23 1216    Specimen: Blood Updated: 08/21/23 1401     Glucose 85 mg/dL      BUN 28 mg/dL      Creatinine 1.43 mg/dL      Sodium 141 mmol/L      Potassium 3.3 mmol/L      Comment: Slight hemolysis  detected by analyzer. Results may be affected.        Chloride 104 mmol/L      CO2 22.0 mmol/L      Calcium 8.3 mg/dL      BUN/Creatinine Ratio 19.6     Anion Gap 15.0 mmol/L      eGFR 44.5 mL/min/1.73     Narrative:      GFR Normal >60  Chronic Kidney Disease <60  Kidney Failure <15      Potassium [[id number]]  (Abnormal) Collected: 08/21/23 1216    Specimen: Blood Updated: 08/21/23 1238     Potassium 3.3 mmol/L      Comment: Slight hemolysis detected by analyzer. Results may be affected.       Extra Tubes [[id number]] Collected: 08/21/23 0141    Specimen: Blood Updated: 08/21/23 0545    Narrative:      The following orders were created for panel order Extra Tubes.  Procedure                               Abnormality         Status                     ---------                               -----------         ------                     Gold Top - SST[[id number]]                                   Final result               Gray Top[[id number]]                                         Final result                 Please view results for these tests on the individual orders.    Alvarez Top [[id number]] Collected: 08/21/23 0141    Specimen: Blood Updated: 08/21/23 0545     Extra Tube Hold for add-ons.     Comment: Auto resulted.       Magnesium [[id number]]  (Normal) Collected: 08/21/23 0141    Specimen: Blood Updated: 08/21/23 0339     Magnesium 2.3 mg/dL     Protime-INR [[id number]]  (Abnormal) Collected: 08/21/23 0212    Specimen: Blood from Hand, Left Updated: 08/21/23 0319     Protime 21.2 Seconds      INR 1.87    Narrative:      Therapeutic range for most indications is 2.0-3.0 INR,  or 2.5-3.5 for mechanical heart valves.    Gold Top - SST [[id number]] Collected: 08/21/23 0141    Specimen: Blood Updated: 08/21/23 0246     Extra Tube Hold for add-ons.     Comment: Auto resulted.       COVID-19 and FLU A/B PCR - Swab, Nasopharynx [[id number]]  (Normal) Collected: 08/21/23 0141    Specimen: Swab from Nasopharynx Updated:  08/21/23 0243     COVID19 Not Detected     Influenza A PCR Not Detected     Influenza B PCR Not Detected    Narrative:      Fact sheet for providers: https://www.fda.gov/media/469078/download    Fact sheet for patients: https://www.fda.gov/media/716045/download    Test performed by PCR.    Fentanyl, Urine - Urine, Clean Catch [[id number]]  (Normal) Collected: 08/21/23 0138    Specimen: Urine, Clean Catch Updated: 08/21/23 0221     Fentanyl, Urine Negative    Narrative:      Negative Threshold:      Fentanyl 5 ng/mL     The normal value for the drug tested is negative. This report includes final unconfirmed screening results to be used for medical treatment purposes only. Unconfirmed results must not be used for non-medical purposes such as employment or legal testing. Clinical consideration should be applied to any drug of abuse test, particularly when unconfirmed results are used.           Comprehensive Metabolic Panel [[id number]]  (Abnormal) Collected: 08/21/23 0137    Specimen: Blood from Hand, Left Updated: 08/21/23 0212     Glucose 102 mg/dL      BUN 31 mg/dL      Creatinine 2.02 mg/dL      Sodium 139 mmol/L      Potassium 3.2 mmol/L      Comment: Slight hemolysis detected by analyzer. Results may be affected.        Chloride 96 mmol/L      CO2 27.0 mmol/L      Calcium 9.6 mg/dL      Total Protein 8.0 g/dL      Albumin 4.1 g/dL      ALT (SGPT) 10 U/L      AST (SGOT) 24 U/L      Comment: Slight hemolysis detected by analyzer. Results may be affected.        Alkaline Phosphatase 121 U/L      Total Bilirubin 1.3 mg/dL      Globulin 3.9 gm/dL      A/G Ratio 1.1 g/dL      BUN/Creatinine Ratio 15.3     Anion Gap 16.0 mmol/L      eGFR 29.4 mL/min/1.73     Narrative:      GFR Normal >60  Chronic Kidney Disease <60  Kidney Failure <15      Urine Drug Screen - Urine, Clean Catch [[id number]]  (Abnormal) Collected: 08/21/23 0138    Specimen: Urine, Clean Catch Updated: 08/21/23 0211     THC, Screen, Urine Negative      Phencyclidine (PCP), Urine Negative     Cocaine Screen, Urine Negative     Methamphetamine, Ur Negative     Opiate Screen Negative     Amphetamine Screen, Urine Positive     Benzodiazepine Screen, Urine Positive     Tricyclic Antidepressants Screen Negative     Methadone Screen, Urine Negative     Barbiturates Screen, Urine Negative     Oxycodone Screen, Urine Negative     Propoxyphene Screen Negative     Buprenorphine, Screen, Urine Negative    Narrative:      Cutoff For Drugs Screened:    Amphetamines               500 ng/ml  Barbiturates               200 ng/ml  Benzodiazepines            150 ng/ml  Cocaine                    150 ng/ml  Methadone                  200 ng/ml  Opiates                    100 ng/ml  Phencyclidine               25 ng/ml  THC                            50 ng/ml  Methamphetamine            500 ng/ml  Tricyclic Antidepressants  300 ng/ml  Oxycodone                  100 ng/ml  Propoxyphene               300 ng/ml  Buprenorphine               10 ng/ml    The normal value for all drugs tested is negative. This report includes unconfirmed screening results, with the cutoff values listed, to be used for medical treatment purposes only.  Unconfirmed results must not be used for non-medical purposes such as employment or legal testing.  Clinical consideration should be applied to any drug of abuse test, particularly when unconfirmed results are used.      Ethanol [[id number]] Collected: 08/21/23 0137    Specimen: Blood from Hand, Left Updated: 08/21/23 0211     Ethanol <10 mg/dL      Ethanol % <0.010 %     Acetaminophen Level [[id number]]  (Normal) Collected: 08/21/23 0137    Specimen: Blood from Hand, Left Updated: 08/21/23 0211     Acetaminophen <5.0 mcg/mL     Salicylate Level [[id number]]  (Normal) Collected: 08/21/23 0137    Specimen: Blood from Hand, Left Updated: 08/21/23 0211     Salicylate <0.3 mg/dL     Urinalysis With Microscopic If Indicated (No Culture) - Urine, Clean Catch [[id number]]   (Normal) Collected: 08/21/23 0138    Specimen: Urine, Clean Catch Updated: 08/21/23 0159     Color, UA Yellow     Appearance, UA Clear     pH, UA <=5.0     Specific Gravity, UA 1.011     Glucose, UA Negative     Ketones, UA Negative     Bilirubin, UA Negative     Blood, UA Negative     Protein, UA Negative     Leuk Esterase, UA Negative     Nitrite, UA Negative     Urobilinogen, UA 0.2 E.U./dL    Narrative:      Urine microscopic not indicated.    CBC & Differential [[id number]]  (Abnormal) Collected: 08/21/23 0137    Specimen: Blood from Hand, Left Updated: 08/21/23 0154    Narrative:      The following orders were created for panel order CBC & Differential.  Procedure                               Abnormality         Status                     ---------                               -----------         ------                     CBC Auto Differential[[id number]]        Abnormal            Final result                 Please view results for these tests on the individual orders.    CBC Auto Differential [[id number]]  (Abnormal) Collected: 08/21/23 0137    Specimen: Blood from Hand, Left Updated: 08/21/23 0154     WBC 6.72 10*3/mm3      RBC 4.49 10*6/mm3      Hemoglobin 13.7 g/dL      Hematocrit 40.7 %      MCV 90.6 fL      MCH 30.5 pg      MCHC 33.7 g/dL      RDW 12.4 %      RDW-SD 40.4 fl      MPV 11.7 fL      Platelets 174 10*3/mm3      Neutrophil % 71.3 %      Lymphocyte % 18.8 %      Monocyte % 7.1 %      Eosinophil % 2.1 %      Basophil % 0.4 %      Immature Grans % 0.3 %      Neutrophils, Absolute 4.79 10*3/mm3      Lymphocytes, Absolute 1.26 10*3/mm3      Monocytes, Absolute 0.48 10*3/mm3      Eosinophils, Absolute 0.14 10*3/mm3      Basophils, Absolute 0.03 10*3/mm3      Immature Grans, Absolute 0.02 10*3/mm3      nRBC 0.0 /100 WBC           Imaging Results (Last 24 Hours)       ** No results found for the last 24 hours. **              Assessment and Plan    Suicidal ideas     No specific plans for it;       TSH normal; will check again vit B12 that was low 7 months ago and folic acid levels      Consulted psychiatrist     Acute kidney injury     Likely dehydration     Improved with hydration; one more day wit IV hydration; monitor    Chronic medical problems     Essential hypertension     Hypothyroidism     Paroxysmal atrial fibrillation     Medical Decision Making  Number and Complexity of problems: one major complex  Differential Diagnosis: CKD    Conditions and Status:        Condition is improving.     [facility] Data  External documents reviewed: previous medical records  My EKG interpretation: n/a  My plain film interpretation: n/a  Tests considered but not ordered: none     Discussed with: ED physician and patient     Treatment Plan  See above    Care Planning  Shared decision making: her mother Lucretia  Code status and discussions: full code    Disposition  Social Determinants of Health that impact treatment or disposition: none  I expect the patient to be discharged : in progress    I confirmed that the patient's Advance Care Plan is present, code status is documented, or surrogate decision maker is listed in the patient's medical record.       Brock Franks MD

## 2023-08-21 NOTE — ED NOTES
Spoke with u charge nurse related to medical admission due to abnormal labs with possible need for more IVF.

## 2023-08-21 NOTE — ED PROVIDER NOTES
Subjective   History of Present Illness  53-year-old female presents the emergency department with concern for suicidal ideation.  She reports significant depression and a lot of factors within the last 6 months that have made her feel this way.  She denies specific plan but reports that she feels like everyone will be better off if she was not here.  She denies any self injury.    Family history, surgical history, social history, current medications and allergies are reviewed with the patient and triage documentation and vitals are reviewed.     History provided by:  Patient and medical records   used: No      Review of Systems   Constitutional:  Positive for fatigue. Negative for chills and fever.   HENT:  Negative for congestion and sore throat.    Eyes:  Negative for photophobia and visual disturbance.   Respiratory:  Negative for cough, shortness of breath and wheezing.    Cardiovascular:  Negative for chest pain, palpitations and leg swelling.   Gastrointestinal:  Negative for diarrhea, nausea and vomiting.   Endocrine: Negative for polydipsia, polyphagia and polyuria.   Genitourinary:  Negative for dysuria, frequency and urgency.   Musculoskeletal:  Negative for arthralgias, back pain, myalgias and neck pain.   Skin:  Negative for rash and wound.   Allergic/Immunologic: Negative.    Neurological:  Negative for weakness, light-headedness and numbness.   Hematological: Negative.    Psychiatric/Behavioral:  Positive for dysphoric mood and suicidal ideas. Negative for confusion and self-injury.      Past Medical History:   Diagnosis Date    Anemia     Arthritis     CHF (congestive heart failure)     Depression     Diabetes mellitus     Disease of thyroid gland     History of transfusion     Hypertension     Injury of back     Kidney stone     MDRO (multiple drug resistant organisms) resistance     Migraine     Seizures        Allergies   Allergen Reactions    Ace Inhibitors Other (See  Comments)     Throat and Lips swelling.     Capsaicin Hives    Nsaids Unknown - Low Severity     Due to having heart surgery and pacemaker    Quetiapine Unknown - Low Severity       Past Surgical History:   Procedure Laterality Date    AORTIC VALVE REPAIR/REPLACEMENT  1995    BREAST BIOPSY Right     CARDIAC CATHETERIZATION      GASTRIC BYPASS  2002    HYSTERECTOMY      TRICUSPID VALVE SURGERY  2014       No family history on file.    Social History     Socioeconomic History    Marital status:    Tobacco Use    Smoking status: Never    Smokeless tobacco: Never   Substance and Sexual Activity    Alcohol use: Not Currently    Drug use: Not Currently    Sexual activity: Defer           Objective   Physical Exam  Vitals and nursing note reviewed.   Constitutional:       General: She is not in acute distress.     Appearance: Normal appearance. She is normal weight. She is not ill-appearing, toxic-appearing or diaphoretic.   HENT:      Head: Normocephalic.   Eyes:      Conjunctiva/sclera: Conjunctivae normal.      Pupils: Pupils are equal, round, and reactive to light.   Cardiovascular:      Rate and Rhythm: Normal rate and regular rhythm.      Heart sounds: No murmur heard.  Pulmonary:      Effort: Pulmonary effort is normal.      Breath sounds: Normal breath sounds.   Abdominal:      General: Bowel sounds are normal.      Palpations: Abdomen is soft.   Musculoskeletal:         General: Normal range of motion.      Cervical back: Normal range of motion.   Skin:     General: Skin is warm and dry.      Capillary Refill: Capillary refill takes less than 2 seconds.   Neurological:      General: No focal deficit present.      Mental Status: She is alert and oriented to person, place, and time.       ECG 12 Lead      Date/Time: 8/21/2023 3:17 AM  Performed by: Singh Dugan DO  Authorized by: Singh Dugan DO   Interpreted by physician  Comments: EKG August 21, 2023 at 0317 reveals pacemaker rhythm with no  obvious acute ischemia.             ED Course  ED Course as of 08/21/23 1556   Mon Aug 21, 2023   1542 The behavioral health unit requested that patient be treated medically prior to transfer to their services.  Findings were discussed with Dr. Franks, who agreed to except patient for IV hydration. [CB]      ED Course User Index  [CB] Greg Arce MD      Labs Reviewed   COMPREHENSIVE METABOLIC PANEL - Abnormal; Notable for the following components:       Result Value    Glucose 102 (*)     BUN 31 (*)     Creatinine 2.02 (*)     Potassium 3.2 (*)     Chloride 96 (*)     Alkaline Phosphatase 121 (*)     Total Bilirubin 1.3 (*)     Anion Gap 16.0 (*)     eGFR 29.4 (*)     All other components within normal limits    Narrative:     GFR Normal >60  Chronic Kidney Disease <60  Kidney Failure <15     URINE DRUG SCREEN - Abnormal; Notable for the following components:    Amphetamine Screen, Urine Positive (*)     Benzodiazepine Screen, Urine Positive (*)     All other components within normal limits    Narrative:     Cutoff For Drugs Screened:    Amphetamines               500 ng/ml  Barbiturates               200 ng/ml  Benzodiazepines            150 ng/ml  Cocaine                    150 ng/ml  Methadone                  200 ng/ml  Opiates                    100 ng/ml  Phencyclidine               25 ng/ml  THC                            50 ng/ml  Methamphetamine            500 ng/ml  Tricyclic Antidepressants  300 ng/ml  Oxycodone                  100 ng/ml  Propoxyphene               300 ng/ml  Buprenorphine               10 ng/ml    The normal value for all drugs tested is negative. This report includes unconfirmed screening results, with the cutoff values listed, to be used for medical treatment purposes only.  Unconfirmed results must not be used for non-medical purposes such as employment or legal testing.  Clinical consideration should be applied to any drug of abuse test, particularly when unconfirmed  results are used.     PROTIME-INR - Abnormal; Notable for the following components:    Protime 21.2 (*)     INR 1.87 (*)     All other components within normal limits    Narrative:     Therapeutic range for most indications is 2.0-3.0 INR,  or 2.5-3.5 for mechanical heart valves.   CBC WITH AUTO DIFFERENTIAL - Abnormal; Notable for the following components:    Lymphocyte % 18.8 (*)     All other components within normal limits   POTASSIUM - Abnormal; Notable for the following components:    Potassium 3.3 (*)     All other components within normal limits   BASIC METABOLIC PANEL - Abnormal; Notable for the following components:    BUN 28 (*)     Creatinine 1.43 (*)     Potassium 3.3 (*)     Calcium 8.3 (*)     eGFR 44.5 (*)     All other components within normal limits    Narrative:     GFR Normal >60  Chronic Kidney Disease <60  Kidney Failure <15     COVID-19 AND FLU A/B, NP SWAB IN TRANSPORT MEDIA 8-12 HR TAT - Normal    Narrative:     Fact sheet for providers: https://www.fda.gov/media/976429/download    Fact sheet for patients: https://www.fda.gov/media/629757/download    Test performed by PCR.   URINALYSIS W/ MICROSCOPIC IF INDICATED (NO CULTURE) - Normal    Narrative:     Urine microscopic not indicated.   ACETAMINOPHEN LEVEL - Normal   SALICYLATE LEVEL - Normal   FENTANYL, URINE - Normal    Narrative:     Negative Threshold:      Fentanyl 5 ng/mL     The normal value for the drug tested is negative. This report includes final unconfirmed screening results to be used for medical treatment purposes only. Unconfirmed results must not be used for non-medical purposes such as employment or legal testing. Clinical consideration should be applied to any drug of abuse test, particularly when unconfirmed results are used.          MAGNESIUM - Normal   ETHANOL   CBC AND DIFFERENTIAL    Narrative:     The following orders were created for panel order CBC & Differential.  Procedure                                Abnormality         Status                     ---------                               -----------         ------                     CBC Auto Differential[[id number]]        Abnormal            Final result                 Please view results for these tests on the individual orders.   EXTRA TUBES    Narrative:     The following orders were created for panel order Extra Tubes.  Procedure                               Abnormality         Status                     ---------                               -----------         ------                     Gold Top - SST[[id number]]                                   Final result               Gray Top[[id number]]                                         Final result                 Please view results for these tests on the individual orders.   GOLD TOP - SST   GRAY TOP     XR Spine Lumbar 2 or 3 View    Result Date: 8/2/2023  Narrative: History: pain COMPARISON: None FINDINGS: AP and lateral  views of the lumbar spine were obtained. Curvature and alignment of the lumbar spine are normal. Age indeterminate T12 compression fracture. Pedicles and SI joints are intact.     Impression: 1. Age-indeterminate T12 compression fracture with 20% height loss.        Medical Decision Making  Problems Addressed:  ANANDA (acute kidney injury): complicated acute illness or injury  Hypokalemia: complicated acute illness or injury  Suicidal ideation: complicated acute illness or injury    Amount and/or Complexity of Data Reviewed  Labs: ordered.  ECG/medicine tests: ordered and independent interpretation performed.    Risk  Prescription drug management.  Decision regarding hospitalization.      Laboratory studies overall unremarkable other than a slightly increased creatinine and mild hypokalemia.  Given fluids and potassiumAnd monitored.  No evidence of ingestion or acute abnormality.  Patient is medically cleared to be evaluated by [facility].    Patient pending evaluation.  BP improved with fluids and stable. Patient signed out to Dr. Arce, please see his documentation.       Final diagnoses:   Suicidal ideation   ANANDA (acute kidney injury)   Hypokalemia       ED Disposition  ED Disposition       ED Disposition   Decision to Admit    Condition   --    Comment   Level of Care: Telemetry [5]   Diagnosis: ANANDA (acute kidney injury) [[id number]]   Admitting Physician: ANA MERRILL [[id number]]   Attending Physician: ANA MERRILL [[id number]]                 No follow-up provider specified.       Medication List      No changes were made to your prescriptions during this visit.            Greg Arce MD  08/21/23 1546       Greg Arce MD  08/21/23 4983

## 2023-08-21 NOTE — ED NOTES
"Pt presents to the ED via EMS r/o SI. Pt stats \"I just don't want to be here anyone. I dont' have a specific way I would do it, I have thought of different ways but if I'm going to do it, then ill do it. I won't plan it out.\" EMS reports pt lost her  in February, lost her home, and recently her dog has ran away.   " back pain general

## 2023-08-21 NOTE — ED NOTES
Called u for update related to possible [facility].  Plan of Care to include admission after potassium po, discharge bhu patients, and rooms cleaned.

## 2023-08-22 LAB
ANION GAP SERPL CALCULATED.3IONS-SCNC: 11 MMOL/L (ref 5–15)
BASOPHILS # BLD AUTO: 0.02 10*3/MM3 (ref 0–0.2)
BASOPHILS NFR BLD AUTO: 0.6 % (ref 0–1.5)
BUN SERPL-MCNC: 20 MG/DL (ref 6–20)
BUN/CREAT SERPL: 16.5 (ref 7–25)
CALCIUM SPEC-SCNC: 9.4 MG/DL (ref 8.6–10.5)
CHLORIDE SERPL-SCNC: 105 MMOL/L (ref 98–107)
CO2 SERPL-SCNC: 26 MMOL/L (ref 22–29)
CREAT SERPL-MCNC: 1.21 MG/DL (ref 0.57–1)
DEPRECATED RDW RBC AUTO: 42.5 FL (ref 37–54)
EGFRCR SERPLBLD CKD-EPI 2021: 54.4 ML/MIN/1.73
EOSINOPHIL # BLD AUTO: 0.19 10*3/MM3 (ref 0–0.4)
EOSINOPHIL NFR BLD AUTO: 5.7 % (ref 0.3–6.2)
ERYTHROCYTE [DISTWIDTH] IN BLOOD BY AUTOMATED COUNT: 12.5 % (ref 12.3–15.4)
FOLATE SERPL-MCNC: 13.1 NG/ML (ref 4.78–24.2)
GEN 5 2HR TROPONIN T REFLEX: 13 NG/L
GLUCOSE SERPL-MCNC: 76 MG/DL (ref 65–99)
HCT VFR BLD AUTO: 40.1 % (ref 34–46.6)
HGB BLD-MCNC: 13 G/DL (ref 12–15.9)
IMM GRANULOCYTES # BLD AUTO: 0 10*3/MM3 (ref 0–0.05)
IMM GRANULOCYTES NFR BLD AUTO: 0 % (ref 0–0.5)
INR PPP: 2.55 (ref 0.8–1.2)
LYMPHOCYTES # BLD AUTO: 0.94 10*3/MM3 (ref 0.7–3.1)
LYMPHOCYTES NFR BLD AUTO: 28 % (ref 19.6–45.3)
MAGNESIUM SERPL-MCNC: 2 MG/DL (ref 1.6–2.6)
MCH RBC QN AUTO: 30.3 PG (ref 26.6–33)
MCHC RBC AUTO-ENTMCNC: 32.4 G/DL (ref 31.5–35.7)
MCV RBC AUTO: 93.5 FL (ref 79–97)
MONOCYTES # BLD AUTO: 0.25 10*3/MM3 (ref 0.1–0.9)
MONOCYTES NFR BLD AUTO: 7.4 % (ref 5–12)
NEUTROPHILS NFR BLD AUTO: 1.96 10*3/MM3 (ref 1.7–7)
NEUTROPHILS NFR BLD AUTO: 58.3 % (ref 42.7–76)
NRBC BLD AUTO-RTO: 0 /100 WBC (ref 0–0.2)
PLATELET # BLD AUTO: 135 10*3/MM3 (ref 140–450)
PMV BLD AUTO: 12.3 FL (ref 6–12)
POTASSIUM SERPL-SCNC: 3.5 MMOL/L (ref 3.5–5.2)
PROTHROMBIN TIME: 26.9 SECONDS (ref 11.1–15.3)
RBC # BLD AUTO: 4.29 10*6/MM3 (ref 3.77–5.28)
SODIUM SERPL-SCNC: 142 MMOL/L (ref 136–145)
TROPONIN T DELTA: 1 NG/L
TROPONIN T SERPL HS-MCNC: 12 NG/L
VIT B12 BLD-MCNC: 1056 PG/ML (ref 211–946)
WBC NRBC COR # BLD: 3.36 10*3/MM3 (ref 3.4–10.8)

## 2023-08-22 PROCEDURE — 83735 ASSAY OF MAGNESIUM: CPT | Performed by: INTERNAL MEDICINE

## 2023-08-22 PROCEDURE — G0378 HOSPITAL OBSERVATION PER HR: HCPCS

## 2023-08-22 PROCEDURE — 85025 COMPLETE CBC W/AUTO DIFF WBC: CPT | Performed by: INTERNAL MEDICINE

## 2023-08-22 PROCEDURE — 93005 ELECTROCARDIOGRAM TRACING: CPT | Performed by: HOSPITALIST

## 2023-08-22 PROCEDURE — 84484 ASSAY OF TROPONIN QUANT: CPT | Performed by: HOSPITALIST

## 2023-08-22 PROCEDURE — 85610 PROTHROMBIN TIME: CPT | Performed by: INTERNAL MEDICINE

## 2023-08-22 PROCEDURE — 93010 ELECTROCARDIOGRAM REPORT: CPT | Performed by: INTERNAL MEDICINE

## 2023-08-22 PROCEDURE — 80048 BASIC METABOLIC PNL TOTAL CA: CPT | Performed by: INTERNAL MEDICINE

## 2023-08-22 PROCEDURE — 96361 HYDRATE IV INFUSION ADD-ON: CPT

## 2023-08-22 PROCEDURE — 90792 PSYCH DIAG EVAL W/MED SRVCS: CPT | Performed by: PSYCHIATRY & NEUROLOGY

## 2023-08-22 PROCEDURE — 36415 COLL VENOUS BLD VENIPUNCTURE: CPT | Performed by: INTERNAL MEDICINE

## 2023-08-22 RX ORDER — ACETAMINOPHEN 325 MG/1
650 TABLET ORAL EVERY 6 HOURS PRN
Status: DISCONTINUED | OUTPATIENT
Start: 2023-08-22 | End: 2023-08-23 | Stop reason: HOSPADM

## 2023-08-22 RX ORDER — LURASIDONE HYDROCHLORIDE 20 MG/1
20 TABLET, FILM COATED ORAL
Status: DISCONTINUED | OUTPATIENT
Start: 2023-08-22 | End: 2023-08-23 | Stop reason: HOSPADM

## 2023-08-22 RX ADMIN — SODIUM CHLORIDE, POTASSIUM CHLORIDE, SODIUM LACTATE AND CALCIUM CHLORIDE 100 ML/HR: 600; 310; 30; 20 INJECTION, SOLUTION INTRAVENOUS at 16:26

## 2023-08-22 RX ADMIN — PANTOPRAZOLE SODIUM 40 MG: 40 TABLET, DELAYED RELEASE ORAL at 05:46

## 2023-08-22 RX ADMIN — LURASIDONE HYDROCHLORIDE 20 MG: 20 TABLET, FILM COATED ORAL at 18:23

## 2023-08-22 RX ADMIN — AMIODARONE HYDROCHLORIDE 200 MG: 200 TABLET ORAL at 09:13

## 2023-08-22 RX ADMIN — SODIUM CHLORIDE, POTASSIUM CHLORIDE, SODIUM LACTATE AND CALCIUM CHLORIDE 100 ML/HR: 600; 310; 30; 20 INJECTION, SOLUTION INTRAVENOUS at 05:50

## 2023-08-22 RX ADMIN — PREGABALIN 25 MG: 25 CAPSULE ORAL at 20:04

## 2023-08-22 RX ADMIN — PREGABALIN 25 MG: 25 CAPSULE ORAL at 11:05

## 2023-08-22 RX ADMIN — ACETAMINOPHEN 650 MG: 325 TABLET, FILM COATED ORAL at 11:05
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
## 2023-08-22 NOTE — PLAN OF CARE
Goal Outcome Evaluation:  Plan of Care Reviewed With: patient        Progress: improving  Outcome Evaluation: Patient A&Ox4, VSS safety maintained with  at bedside. Awaiting further workup and determination from behavioral health. Aware of limitations, calls out for assistance as needed.

## 2023-08-22 NOTE — PROGRESS NOTES
[facility] Medicine Services  INPATIENT PROGRESS NOTE      Length of Stay: 0  Date of Admission: 2023  Primary Care Physician: Leanne Sarmiento APRN    Subjective   Chief Complaint:  Resting comfortably.  Easily aroused.  Not wanting to have conversations.  HPI:  Patient female with history of depression, type II DM, essential hypertension, hypothyroidism came to the hospital early morning with suicidal ideas that started after her   in this hospital in , on and off; She was also diagnosed on admission with ANANDA, likely due to dehydration; she has been hydrated during her stay in ER and her Cr went down from 2 to 1.4.   She has a history of gastric bypass and after that she has not longer diabetes and hypertension  ER physician has talked to psychiatrist who advised to be medical clear before transfer the patient to psych unit  She is on warfarin due to atrial fibrillation    Daily assessment 2023  On evaluation the patient is currently resting comfortably in bed.  She is easily aroused with verbal stimuli.  She states she did not want to have any conversations.  Very flat affect is noted on evaluation.  Awaiting psychiatry evaluation.  Sitter is at bedside.      Review of Systems   Unable to perform ROS: Patient nonverbal      All pertinent negatives and positives are as above. All other systems have been reviewed and are negative unless otherwise stated.     Objective    As of today 23  Temp:  [96.9 øF (36.1 øC)-98.3 øF (36.8 øC)] 97.1 øF (36.2 øC)  Heart Rate:  [60] 60  Resp:  [18] 18  BP: (103-134)/(57-76) 117/57    Physical Exam  Vitals and nursing note reviewed.   Constitutional:       Appearance: Normal appearance.   HENT:      Head: Normocephalic and atraumatic.      Right Ear: External ear normal.      Left Ear: External ear normal.      Nose: Nose normal.      Mouth/Throat:      Mouth: Mucous membranes are moist.       Pharynx: Oropharynx is clear.   Eyes:      General: No scleral icterus.     Extraocular Movements: Extraocular movements intact.      Conjunctiva/sclera: Conjunctivae normal.      Pupils: Pupils are equal, round, and reactive to light.   Cardiovascular:      Rate and Rhythm: Normal rate. Rhythm irregular.      Heart sounds: No murmur heard.  Pulmonary:      Effort: Pulmonary effort is normal.      Breath sounds: Normal breath sounds.   Abdominal:      General: Bowel sounds are normal.      Palpations: Abdomen is soft.   Musculoskeletal:         General: Normal range of motion.      Cervical back: Normal range of motion and neck supple.   Skin:     General: Skin is warm and dry.      Capillary Refill: Capillary refill takes less than 2 seconds.   Neurological:      General: No focal deficit present.      Mental Status: She is alert and oriented to person, place, and time.   Psychiatric:         Mood and Affect: Mood normal.         Behavior: Behavior normal.         amiodarone, 200 mg, Oral, Daily  pantoprazole, 40 mg, Oral, Q AM  pregabalin, 25 mg, Oral, Q12H  senna-docusate sodium, 2 tablet, Oral, BID  sodium chloride, 10 mL, Intravenous, Q12H         Results Review:  I have reviewed the labs, radiology results, and diagnostic studies.    Laboratory Data:   Results from last 7 days   Lab Units 08/22/23  0652 08/21/23  1216 08/21/23  0137   SODIUM mmol/L 142 141 139   POTASSIUM mmol/L 3.5 3.3*  3.3* 3.2*   CHLORIDE mmol/L 105 104 96*   CO2 mmol/L 26.0 22.0 27.0   BUN mg/dL 20 28* 31*   CREATININE mg/dL 1.21* 1.43* 2.02*   GLUCOSE mg/dL 76 85 102*   CALCIUM mg/dL 9.4 8.3* 9.6   BILIRUBIN mg/dL  --   --  1.3*   ALK PHOS U/L  --   --  121*   ALT (SGPT) U/L  --   --  10   AST (SGOT) U/L  --   --  24   ANION GAP mmol/L 11.0 15.0 16.0*     Estimated Creatinine Clearance: 54.1 mL/min (A) (by C-G formula based on SCr of 1.21 mg/dL (H)).  Results from last 7 days   Lab Units 08/22/23  0652 08/21/23  0141   MAGNESIUM mg/dL  2.0 2.3         Results from last 7 days   Lab Units 08/22/23  0652 08/21/23  1703 08/21/23  0137   WBC 10*3/mm3 3.36* 4.63 6.72   HEMOGLOBIN g/dL 13.0 14.1 13.7   HEMATOCRIT % 40.1 43.5 40.7   PLATELETS 10*3/mm3 135* 132* 174     Results from last 7 days   Lab Units 08/22/23  0751 08/21/23  0212 08/16/23  1119   INR  2.55* 1.87* 1.14       Culture Data:   No results found for: BLOODCX  No results found for: URINECX  No results found for: RESPCX  No results found for: WOUNDCX  No results found for: STOOLCX  No components found for: BODYFLD    Radiology Data:   Imaging Results (Last 24 Hours)       ** No results found for the last 24 hours. **            I have reviewed the patient's current medications.     Assessment/Plan     Principal Problem:    ANANDA (acute kidney injury)        Impression/plan    Suicidal ideation     Currently stable.  Resting comfortably.     TSH normal; will vit B12 173 and folic acid levels 28.8     Consulted psychiatrist pending     Acute kidney injury     Improving.  BUN is 20 creatinine 1.21.  GFR is 54.4       Essential hypertension  Blood pressure currently 117/57 and O2 saturation is 97% on room air    Hypothyroidism  TSH 1.6 on 8/3/2023    Paroxysmal atrial fibrillation  Currently paced.  Continue amiodarone     Medical Decision Making  Number and Complexity of problems: one major complex  Differential Diagnosis: CKD     Conditions and Status:        Condition is improving.     MDM Data  External documents reviewed: previous medical records  My EKG interpretation: n/a  My plain film interpretation: n/a  Tests considered but not ordered: none     Discussed with: ED physician and patient     Treatment Plan  See above     Care Planning  Shared decision making: her mother Lucretia  Code status and discussions: full code     Disposition  Social Determinants of Health that impact treatment or disposition: none  I expect the patient to be discharged : in progress     I confirmed that the  patient's Advance Care Plan is present, code status is documented, or surrogate decision maker is listed in the patient's medical record.       Chapo Edmonds, DO

## 2023-08-22 NOTE — PROGRESS NOTES
"Anticoagulation by Pharmacy - Warfarin    Wing Hickman is a 53 y.o.female being continued on warfarin for atrial fibrillation  Home regimen: 6 mg daily  INR Goal: 2-3  Last INR:   Lab Results   Component Value Date    INR 2.55 (H) 08/22/2023       Objective:  [Ht: 157.5 cm (62\"); Wt: 80.7 kg (178 lb)]  Lab Results   Component Value Date    INR 2.55 (H) 08/22/2023    INR 1.87 (H) 08/21/2023    INR 1.14 08/16/2023    PROTIME 26.9 (H) 08/22/2023    PROTIME 21.2 (H) 08/21/2023    PROTIME 11.3 08/16/2023     Lab Results   Component Value Date    HGB 13.0 08/22/2023    HGB 14.1 08/21/2023    HGB 13.7 08/21/2023    HCT 40.1 08/22/2023    HCT 43.5 08/21/2023    HCT 40.7 08/21/2023     (L) 08/22/2023     (L) 08/21/2023     08/21/2023       Recent Warfarin Administrations       The 5 most recent administrations since 08/15/2023 are shown below each listed medication.    Warfarin Sodium         Order Dose Date Given     warfarin (COUMADIN) tablet 6 mg 6 mg 08/21/2023                    Assessment  Interacting medications: amiodarone can increase the affect of warfarin on the INR  INR is therapeutic but increased quickly  Patient did not eat dinner last night or breakfast this morning  H&H WNL  Given rapid rise in INR and lack of intake - will hold warfarin tonight    Plan:  1.  HOLD warfarin  2.  Draw a PT/INR in AM  3.  Pharmacy will continue to follow    Andrea Rodriguez, PharmD  08/22/23 11:20 CDT     "

## 2023-08-23 ENCOUNTER — HOSPITAL ENCOUNTER (INPATIENT)
Facility: HOSPITAL | Age: 53
LOS: 19 days | Discharge: HOME | DRG: 885 | End: 2023-09-11
Attending: PSYCHIATRY & NEUROLOGY | Admitting: PSYCHIATRY & NEUROLOGY
Payer: MEDICARE

## 2023-08-23 VITALS
WEIGHT: 178 LBS | TEMPERATURE: 97.2 F | HEART RATE: 61 BPM | DIASTOLIC BLOOD PRESSURE: 59 MMHG | SYSTOLIC BLOOD PRESSURE: 118 MMHG | OXYGEN SATURATION: 100 % | RESPIRATION RATE: 18 BRPM | BODY MASS INDEX: 32.76 KG/M2 | HEIGHT: 62 IN

## 2023-08-23 DIAGNOSIS — F41.0 PANIC ATTACKS: Primary | ICD-10-CM

## 2023-08-23 PROBLEM — R45.851 SUICIDAL IDEATION: Status: ACTIVE | Noted: 2023-08-23

## 2023-08-23 PROBLEM — F31.4 BIPOLAR I DISORDER, MOST RECENT EPISODE DEPRESSED, SEVERE WITHOUT PSYCHOTIC FEATURES: Status: ACTIVE | Noted: 2023-08-23

## 2023-08-23 PROBLEM — Z63.4 BEREAVEMENT: Status: ACTIVE | Noted: 2023-08-23

## 2023-08-23 PROBLEM — F43.10 POST TRAUMATIC STRESS DISORDER (PTSD): Status: ACTIVE | Noted: 2023-08-23

## 2023-08-23 LAB
ANION GAP SERPL CALCULATED.3IONS-SCNC: 8 MMOL/L (ref 5–15)
BASOPHILS # BLD AUTO: 0.02 10*3/MM3 (ref 0–0.2)
BASOPHILS NFR BLD AUTO: 0.5 % (ref 0–1.5)
BUN SERPL-MCNC: 16 MG/DL (ref 6–20)
BUN/CREAT SERPL: 13.7 (ref 7–25)
CALCIUM SPEC-SCNC: 9.1 MG/DL (ref 8.6–10.5)
CHLORIDE SERPL-SCNC: 108 MMOL/L (ref 98–107)
CO2 SERPL-SCNC: 28 MMOL/L (ref 22–29)
CREAT SERPL-MCNC: 1.17 MG/DL (ref 0.57–1)
DEPRECATED RDW RBC AUTO: 41 FL (ref 37–54)
EGFRCR SERPLBLD CKD-EPI 2021: 56.6 ML/MIN/1.73
EOSINOPHIL # BLD AUTO: 0.12 10*3/MM3 (ref 0–0.4)
EOSINOPHIL NFR BLD AUTO: 3 % (ref 0.3–6.2)
ERYTHROCYTE [DISTWIDTH] IN BLOOD BY AUTOMATED COUNT: 12.5 % (ref 12.3–15.4)
GLUCOSE SERPL-MCNC: 86 MG/DL (ref 65–99)
HCT VFR BLD AUTO: 39.1 % (ref 34–46.6)
HGB BLD-MCNC: 12.6 G/DL (ref 12–15.9)
IMM GRANULOCYTES # BLD AUTO: 0.01 10*3/MM3 (ref 0–0.05)
IMM GRANULOCYTES NFR BLD AUTO: 0.3 % (ref 0–0.5)
INR PPP: 3.32 (ref 0.8–1.2)
LYMPHOCYTES # BLD AUTO: 0.87 10*3/MM3 (ref 0.7–3.1)
LYMPHOCYTES NFR BLD AUTO: 21.9 % (ref 19.6–45.3)
MAGNESIUM SERPL-MCNC: 1.8 MG/DL (ref 1.6–2.6)
MCH RBC QN AUTO: 29.3 PG (ref 26.6–33)
MCHC RBC AUTO-ENTMCNC: 32.2 G/DL (ref 31.5–35.7)
MCV RBC AUTO: 90.9 FL (ref 79–97)
MONOCYTES # BLD AUTO: 0.21 10*3/MM3 (ref 0.1–0.9)
MONOCYTES NFR BLD AUTO: 5.3 % (ref 5–12)
NEUTROPHILS NFR BLD AUTO: 2.75 10*3/MM3 (ref 1.7–7)
NEUTROPHILS NFR BLD AUTO: 69 % (ref 42.7–76)
NRBC BLD AUTO-RTO: 0 /100 WBC (ref 0–0.2)
PLATELET # BLD AUTO: 153 10*3/MM3 (ref 140–450)
PMV BLD AUTO: 11.6 FL (ref 6–12)
POTASSIUM SERPL-SCNC: 3.5 MMOL/L (ref 3.5–5.2)
PROTHROMBIN TIME: 32.7 SECONDS (ref 11.1–15.3)
RBC # BLD AUTO: 4.3 10*6/MM3 (ref 3.77–5.28)
SODIUM SERPL-SCNC: 144 MMOL/L (ref 136–145)
WBC NRBC COR # BLD: 3.98 10*3/MM3 (ref 3.4–10.8)

## 2023-08-23 PROCEDURE — 83735 ASSAY OF MAGNESIUM: CPT | Performed by: INTERNAL MEDICINE

## 2023-08-23 PROCEDURE — G0378 HOSPITAL OBSERVATION PER HR: HCPCS

## 2023-08-23 PROCEDURE — 80048 BASIC METABOLIC PNL TOTAL CA: CPT | Performed by: INTERNAL MEDICINE

## 2023-08-23 PROCEDURE — 36415 COLL VENOUS BLD VENIPUNCTURE: CPT | Performed by: INTERNAL MEDICINE

## 2023-08-23 PROCEDURE — 85025 COMPLETE CBC W/AUTO DIFF WBC: CPT | Performed by: INTERNAL MEDICINE

## 2023-08-23 PROCEDURE — 85610 PROTHROMBIN TIME: CPT | Performed by: INTERNAL MEDICINE

## 2023-08-23 RX ORDER — LOPERAMIDE HYDROCHLORIDE 2 MG/1
2 CAPSULE ORAL
Status: DISCONTINUED | OUTPATIENT
Start: 2023-08-23 | End: 2023-09-11 | Stop reason: HOSPADM

## 2023-08-23 RX ORDER — TRAZODONE HYDROCHLORIDE 50 MG/1
50 TABLET ORAL NIGHTLY PRN
Status: DISCONTINUED | OUTPATIENT
Start: 2023-08-23 | End: 2023-08-24

## 2023-08-23 RX ORDER — LURASIDONE HYDROCHLORIDE 20 MG/1
20 TABLET, FILM COATED ORAL
Status: DISCONTINUED | OUTPATIENT
Start: 2023-08-23 | End: 2023-08-27

## 2023-08-23 RX ORDER — ALUMINA, MAGNESIA, AND SIMETHICONE 2400; 2400; 240 MG/30ML; MG/30ML; MG/30ML
15 SUSPENSION ORAL EVERY 6 HOURS PRN
Status: DISCONTINUED | OUTPATIENT
Start: 2023-08-23 | End: 2023-09-11 | Stop reason: HOSPADM

## 2023-08-23 RX ORDER — ONDANSETRON 4 MG/1
4 TABLET, ORALLY DISINTEGRATING ORAL EVERY 6 HOURS PRN
Status: DISCONTINUED | OUTPATIENT
Start: 2023-08-23 | End: 2023-09-11 | Stop reason: HOSPADM

## 2023-08-23 RX ORDER — PREGABALIN 25 MG/1
25 CAPSULE ORAL 2 TIMES DAILY
Status: DISCONTINUED | OUTPATIENT
Start: 2023-08-23 | End: 2023-09-11 | Stop reason: HOSPADM

## 2023-08-23 RX ORDER — ACETAMINOPHEN 325 MG/1
650 TABLET ORAL EVERY 4 HOURS PRN
Status: DISCONTINUED | OUTPATIENT
Start: 2023-08-23 | End: 2023-09-11 | Stop reason: HOSPADM

## 2023-08-23 RX ORDER — LURASIDONE HYDROCHLORIDE 20 MG/1
20 TABLET, FILM COATED ORAL
Qty: 60 TABLET
Start: 2023-08-23 | End: 2023-09-11 | Stop reason: HOSPADM

## 2023-08-23 RX ORDER — HYDROXYZINE PAMOATE 50 MG/1
50 CAPSULE ORAL EVERY 6 HOURS PRN
Status: DISCONTINUED | OUTPATIENT
Start: 2023-08-23 | End: 2023-09-11 | Stop reason: HOSPADM

## 2023-08-23 RX ORDER — PRAZOSIN HYDROCHLORIDE 1 MG/1
1 CAPSULE ORAL NIGHTLY
Status: DISCONTINUED | OUTPATIENT
Start: 2023-08-23 | End: 2023-08-24

## 2023-08-23 RX ORDER — PANTOPRAZOLE SODIUM 40 MG/1
40 TABLET, DELAYED RELEASE ORAL DAILY
Status: DISCONTINUED | OUTPATIENT
Start: 2023-08-24 | End: 2023-09-11 | Stop reason: HOSPADM

## 2023-08-23 RX ORDER — AMIODARONE HYDROCHLORIDE 200 MG/1
200 TABLET ORAL DAILY
Status: DISCONTINUED | OUTPATIENT
Start: 2023-08-24 | End: 2023-09-11 | Stop reason: HOSPADM

## 2023-08-23 RX ORDER — CLONIDINE HYDROCHLORIDE 0.1 MG/1
0.1 TABLET ORAL EVERY 4 HOURS PRN
Status: DISCONTINUED | OUTPATIENT
Start: 2023-08-23 | End: 2023-09-11 | Stop reason: HOSPADM

## 2023-08-23 RX ADMIN — PRAZOSIN HYDROCHLORIDE 1 MG: 1 CAPSULE ORAL at 20:57

## 2023-08-23 RX ADMIN — PREGABALIN 25 MG: 25 CAPSULE ORAL at 20:57

## 2023-08-23 RX ADMIN — LURASIDONE HYDROCHLORIDE 20 MG: 20 TABLET, FILM COATED ORAL at 18:09

## 2023-08-23 RX ADMIN — AMIODARONE HYDROCHLORIDE 200 MG: 200 TABLET ORAL at 08:27

## 2023-08-23 RX ADMIN — CLONAZEPAM 0.5 MG: 0.5 TABLET ORAL at 08:42

## 2023-08-23 RX ADMIN — PANTOPRAZOLE SODIUM 40 MG: 40 TABLET, DELAYED RELEASE ORAL at 05:00

## 2023-08-23 RX ADMIN — PREGABALIN 25 MG: 25 CAPSULE ORAL at 08:27

## 2023-08-23 NOTE — PLAN OF CARE
Goal Outcome Evaluation:  Plan of Care Reviewed With: patient        Progress: no change  Outcome Evaluation: No change. Pt is resting. Will continue to monitor

## 2023-08-23 NOTE — DISCHARGE SUMMARY
[facility] Medicine Services  DISCHARGE SUMMARY       Date of Admission: 8/21/2023  Date of Discharge:  8/23/2023  Primary Care Physician: Leanne Sarmiento APRN    Presenting Problem/History of Present Illness:  Suicidal ideation [R45.851]  Hypokalemia [E87.6]  ANANDA (acute kidney injury) [N17.9]       Final Discharge Diagnoses:  Active Hospital Problems    Diagnosis     **ANANDA (acute kidney injury)     Bipolar I disorder, most recent episode depressed, severe without psychotic features     Bereavement     Suicidal ideation     Post traumatic stress disorder (PTSD)        Consults:   Consults       Date and Time Order Name Status Description    8/21/2023  4:57 PM Inpatient Psychiatrist Consult Completed     8/21/2023  3:54 PM Hospitalist (on-call MD unless specified)      8/21/2023  3:41 PM Hospitalist (on-call MD unless specified)              Procedures Performed:                 Pertinent Test Results:   Lab Results (most recent)       Procedure Component Value Units Date/Time    Protime-INR [[id number]]  (Abnormal) Collected: 08/23/23 0743    Specimen: Blood Updated: 08/23/23 0820     Protime 32.7 Seconds      INR 3.32    Narrative:      Therapeutic range for most indications is 2.0-3.0 INR,  or 2.5-3.5 for mechanical heart valves.    Magnesium [[id number]]  (Normal) Collected: 08/23/23 0743    Specimen: Blood Updated: 08/23/23 0819     Magnesium 1.8 mg/dL     Basic Metabolic Panel [[id number]]  (Abnormal) Collected: 08/23/23 0743    Specimen: Blood Updated: 08/23/23 0819     Glucose 86 mg/dL      BUN 16 mg/dL      Creatinine 1.17 mg/dL      Sodium 144 mmol/L      Potassium 3.5 mmol/L      Chloride 108 mmol/L      CO2 28.0 mmol/L      Calcium 9.1 mg/dL      BUN/Creatinine Ratio 13.7     Anion Gap 8.0 mmol/L      eGFR 56.6 mL/min/1.73     Narrative:      GFR Normal >60  Chronic Kidney Disease <60  Kidney Failure <15      CBC & Differential [[id number]]  (Normal)  Collected: 08/23/23 0743    Specimen: Blood Updated: 08/23/23 0800    Narrative:      The following orders were created for panel order CBC & Differential.  Procedure                               Abnormality         Status                     ---------                               -----------         ------                     CBC Auto Differential[[id number]]        Normal              Final result                 Please view results for these tests on the individual orders.    CBC Auto Differential [[id number]]  (Normal) Collected: 08/23/23 0743    Specimen: Blood Updated: 08/23/23 0800     WBC 3.98 10*3/mm3      RBC 4.30 10*6/mm3      Hemoglobin 12.6 g/dL      Hematocrit 39.1 %      MCV 90.9 fL      MCH 29.3 pg      MCHC 32.2 g/dL      RDW 12.5 %      RDW-SD 41.0 fl      MPV 11.6 fL      Platelets 153 10*3/mm3      Neutrophil % 69.0 %      Lymphocyte % 21.9 %      Monocyte % 5.3 %      Eosinophil % 3.0 %      Basophil % 0.5 %      Immature Grans % 0.3 %      Neutrophils, Absolute 2.75 10*3/mm3      Lymphocytes, Absolute 0.87 10*3/mm3      Monocytes, Absolute 0.21 10*3/mm3      Eosinophils, Absolute 0.12 10*3/mm3      Basophils, Absolute 0.02 10*3/mm3      Immature Grans, Absolute 0.01 10*3/mm3      nRBC 0.0 /100 WBC     Vitamin B12 [[id number]]  (Abnormal) Collected: 08/21/23 0141    Specimen: Blood Updated: 08/22/23 2013     Vitamin B-12 1,056 pg/mL     Narrative:      Results may be falsely increased if patient taking Biotin.      Folate [[id number]]  (Normal) Collected: 08/21/23 0141    Specimen: Blood Updated: 08/22/23 2013     Folate 13.10 ng/mL     Narrative:      Results may be falsely increased if patient taking Biotin.      High Sensitivity Troponin T 2Hr [[id number]]  (Abnormal) Collected: 08/22/23 1715    Specimen: Blood Updated: 08/22/23 1815     HS Troponin T 13 ng/L      Troponin T Delta 1 ng/L     Narrative:      High Sensitive Troponin T Reference Range:  <10.0 ng/L- Negative Female for AMI  <15.0  ng/L- Negative Male for AMI  >=10 - Abnormal Female indicating possible myocardial injury.  >=15 - Abnormal Male indicating possible myocardial injury.   Clinicians would have to utilize clinical acumen, EKG, Troponin, and serial changes to determine if it is an Acute Myocardial Infarction or myocardial injury due to an underlying chronic condition.         High Sensitivity Troponin T [[id number]]  (Abnormal) Collected: 08/22/23 1527    Specimen: Blood Updated: 08/22/23 1551     HS Troponin T 12 ng/L     Narrative:      High Sensitive Troponin T Reference Range:  <10.0 ng/L- Negative Female for AMI  <15.0 ng/L- Negative Male for AMI  >=10 - Abnormal Female indicating possible myocardial injury.  >=15 - Abnormal Male indicating possible myocardial injury.   Clinicians would have to utilize clinical acumen, EKG, Troponin, and serial changes to determine if it is an Acute Myocardial Infarction or myocardial injury due to an underlying chronic condition.         Protime-INR [[id number]]  (Abnormal) Collected: 08/22/23 0751    Specimen: Blood Updated: 08/22/23 0829     Protime 26.9 Seconds      INR 2.55    Narrative:      Therapeutic range for most indications is 2.0-3.0 INR,  or 2.5-3.5 for mechanical heart valves.    Magnesium [[id number]]  (Normal) Collected: 08/22/23 0652    Specimen: Blood Updated: 08/22/23 0733     Magnesium 2.0 mg/dL     Basic Metabolic Panel [[id number]]  (Abnormal) Collected: 08/22/23 0652    Specimen: Blood Updated: 08/22/23 0733     Glucose 76 mg/dL      BUN 20 mg/dL      Creatinine 1.21 mg/dL      Sodium 142 mmol/L      Potassium 3.5 mmol/L      Chloride 105 mmol/L      CO2 26.0 mmol/L      Calcium 9.4 mg/dL      BUN/Creatinine Ratio 16.5     Anion Gap 11.0 mmol/L      eGFR 54.4 mL/min/1.73     Narrative:      GFR Normal >60  Chronic Kidney Disease <60  Kidney Failure <15      CBC & Differential [[id number]]  (Abnormal) Collected: 08/22/23 0652    Specimen: Blood Updated: 08/22/23 0722     Narrative:      The following orders were created for panel order CBC & Differential.  Procedure                               Abnormality         Status                     ---------                               -----------         ------                     CBC Auto Differential[[id number]]        Abnormal            Final result                 Please view results for these tests on the individual orders.    CBC Auto Differential [[id number]]  (Abnormal) Collected: 08/22/23 0652    Specimen: Blood Updated: 08/22/23 0722     WBC 3.36 10*3/mm3      RBC 4.29 10*6/mm3      Hemoglobin 13.0 g/dL      Hematocrit 40.1 %      MCV 93.5 fL      MCH 30.3 pg      MCHC 32.4 g/dL      RDW 12.5 %      RDW-SD 42.5 fl      MPV 12.3 fL      Platelets 135 10*3/mm3      Neutrophil % 58.3 %      Lymphocyte % 28.0 %      Monocyte % 7.4 %      Eosinophil % 5.7 %      Basophil % 0.6 %      Immature Grans % 0.0 %      Neutrophils, Absolute 1.96 10*3/mm3      Lymphocytes, Absolute 0.94 10*3/mm3      Monocytes, Absolute 0.25 10*3/mm3      Eosinophils, Absolute 0.19 10*3/mm3      Basophils, Absolute 0.02 10*3/mm3      Immature Grans, Absolute 0.00 10*3/mm3      nRBC 0.0 /100 WBC     Potassium [[id number]]  (Abnormal) Collected: 08/21/23 1216    Specimen: Blood Updated: 08/21/23 1238     Potassium 3.3 mmol/L      Comment: Slight hemolysis detected by analyzer. Results may be affected.       Extra Tubes [[id number]] Collected: 08/21/23 0141    Specimen: Blood Updated: 08/21/23 0545    Narrative:      The following orders were created for panel order Extra Tubes.  Procedure                               Abnormality         Status                     ---------                               -----------         ------                     Gold Top - SST[[id number]]                                   Final result               Gray Top[[id number]]                                         Final result                 Please view results for these tests on  the individual orders.    Alvarez Top [[id number]] Collected: 08/21/23 0141    Specimen: Blood Updated: 08/21/23 0545     Extra Tube Hold for add-ons.     Comment: Auto resulted.       Gold Top - SST [[id number]] Collected: 08/21/23 0141    Specimen: Blood Updated: 08/21/23 0246     Extra Tube Hold for add-ons.     Comment: Auto resulted.       COVID-19 and FLU A/B PCR - Swab, Nasopharynx [[id number]]  (Normal) Collected: 08/21/23 0141    Specimen: Swab from Nasopharynx Updated: 08/21/23 0243     COVID19 Not Detected     Influenza A PCR Not Detected     Influenza B PCR Not Detected    Narrative:      Fact sheet for providers: https://www.fda.gov/media/979964/download    Fact sheet for patients: https://www.fda.gov/media/871915/download    Test performed by PCR.    Fentanyl, Urine - Urine, Clean Catch [[id number]]  (Normal) Collected: 08/21/23 0138    Specimen: Urine, Clean Catch Updated: 08/21/23 0221     Fentanyl, Urine Negative    Narrative:      Negative Threshold:      Fentanyl 5 ng/mL     The normal value for the drug tested is negative. This report includes final unconfirmed screening results to be used for medical treatment purposes only. Unconfirmed results must not be used for non-medical purposes such as employment or legal testing. Clinical consideration should be applied to any drug of abuse test, particularly when unconfirmed results are used.           Comprehensive Metabolic Panel [[id number]]  (Abnormal) Collected: 08/21/23 0137    Specimen: Blood from Hand, Left Updated: 08/21/23 0212     Glucose 102 mg/dL      BUN 31 mg/dL      Creatinine 2.02 mg/dL      Sodium 139 mmol/L      Potassium 3.2 mmol/L      Comment: Slight hemolysis detected by analyzer. Results may be affected.        Chloride 96 mmol/L      CO2 27.0 mmol/L      Calcium 9.6 mg/dL      Total Protein 8.0 g/dL      Albumin 4.1 g/dL      ALT (SGPT) 10 U/L      AST (SGOT) 24 U/L      Comment: Slight hemolysis detected by analyzer. Results may be  affected.        Alkaline Phosphatase 121 U/L      Total Bilirubin 1.3 mg/dL      Globulin 3.9 gm/dL      A/G Ratio 1.1 g/dL      BUN/Creatinine Ratio 15.3     Anion Gap 16.0 mmol/L      eGFR 29.4 mL/min/1.73     Narrative:      GFR Normal >60  Chronic Kidney Disease <60  Kidney Failure <15      Urine Drug Screen - Urine, Clean Catch [[id number]]  (Abnormal) Collected: 08/21/23 0138    Specimen: Urine, Clean Catch Updated: 08/21/23 0211     THC, Screen, Urine Negative     Phencyclidine (PCP), Urine Negative     Cocaine Screen, Urine Negative     Methamphetamine, Ur Negative     Opiate Screen Negative     Amphetamine Screen, Urine Positive     Benzodiazepine Screen, Urine Positive     Tricyclic Antidepressants Screen Negative     Methadone Screen, Urine Negative     Barbiturates Screen, Urine Negative     Oxycodone Screen, Urine Negative     Propoxyphene Screen Negative     Buprenorphine, Screen, Urine Negative    Narrative:      Cutoff For Drugs Screened:    Amphetamines               500 ng/ml  Barbiturates               200 ng/ml  Benzodiazepines            150 ng/ml  Cocaine                    150 ng/ml  Methadone                  200 ng/ml  Opiates                    100 ng/ml  Phencyclidine               25 ng/ml  THC                            50 ng/ml  Methamphetamine            500 ng/ml  Tricyclic Antidepressants  300 ng/ml  Oxycodone                  100 ng/ml  Propoxyphene               300 ng/ml  Buprenorphine               10 ng/ml    The normal value for all drugs tested is negative. This report includes unconfirmed screening results, with the cutoff values listed, to be used for medical treatment purposes only.  Unconfirmed results must not be used for non-medical purposes such as employment or legal testing.  Clinical consideration should be applied to any drug of abuse test, particularly when unconfirmed results are used.      Ethanol [[id number]] Collected: 08/21/23 0137    Specimen: Blood from Hand,  Left Updated: 23     Ethanol <10 mg/dL      Ethanol % <0.010 %     Acetaminophen Level [[id number]]  (Normal) Collected: 23    Specimen: Blood from Hand, Left Updated: 23     Acetaminophen <5.0 mcg/mL     Salicylate Level [[id number]]  (Normal) Collected: 23    Specimen: Blood from Hand, Left Updated: 23     Salicylate <0.3 mg/dL     Urinalysis With Microscopic If Indicated (No Culture) - Urine, Clean Catch [[id number]]  (Normal) Collected: 23    Specimen: Urine, Clean Catch Updated: 23     Color, UA Yellow     Appearance, UA Clear     pH, UA <=5.0     Specific Gravity, UA 1.011     Glucose, UA Negative     Ketones, UA Negative     Bilirubin, UA Negative     Blood, UA Negative     Protein, UA Negative     Leuk Esterase, UA Negative     Nitrite, UA Negative     Urobilinogen, UA 0.2 E.U./dL    Narrative:      Urine microscopic not indicated.          Imaging Results (Most Recent)       None            Chief Complaint on Day of Discharge:   Stable on day of discharge known new complaint  Hospital Course:  The patient is a 53 y.o. female who presented to [facility] with suicidal ideation  Patient admitted to the hospital 2023 and discharged to the care of behavioral health on 2023    Patient female with history of depression, type II DM, essential hypertension, hypothyroidism came to the hospital early morning with suicidal ideas that started after her   in this hospital in , on and off; She was also diagnosed on admission with ANANDA, likely due to dehydration; she has been hydrated during her stay in ER and her Cr went down from 2 to 1.4.   She has a history of gastric bypass and after that she has not longer diabetes and hypertension  ER physician has talked to psychiatrist who advised to be medical clear before transfer the patient to psych unit  She is on warfarin due to atrial  "fibrillation    Impression/hospital treatment plan  Suicidal ideation     Currently stable.  Resting comfortably.     TSH normal; will vit B12 173 and folic acid levels 28.8     Consulted psychiatrist completed 8/22/2023  Recommendations:  Bipolar I disorder:              --Stop Vraylar given lack of efficacy and formulary issues.              --Start Latuda 20mg qpm dinner.  Plan to titrate.     Anxiety/PTSD:              --hold prazosin from home due to hypotension.              --Continue home klonopin at lower 0.5mg bid PRN     Insomnia:              --Hold temazepam and monitor insomnia.     Bereavement:              --Provided therapy     Suicidal Ideation:              --Currently passive but given severe depression patient will need psych admission once medically cleared.     Acute kidney injury     Improving.  BUN is 20 creatinine 1.21.  GFR is 54.4       Essential hypertension  Blood pressure currently 117/57 and O2 saturation is 97% on room air     Hypothyroidism  TSH 1.6 on 8/3/2023     Paroxysmal atrial fibrillation  Currently paced.  Continue amiodarone    CODE STATUS full code    Discharge assessment 8/23/2023  On evaluation the patient is currently lying in bed.  Awake and alert.  No new issues or concerns.  He has agreed to go to behavioral health.  States she is adjusting to her new medication changes per psychiatry.  She has no fevers or chills.  No nausea or vomiting.  Vital signs are stable patient is afebrile.  I reviewed all findings discharge medications follow instructions with the patient states she understands and agrees to treatment recommendations.          Condition on Discharge: Stable    Physical Exam on Discharge:  /59 (BP Location: Right arm, Patient Position: Lying)   Pulse 61   Temp 97.2 øF (36.2 øC) (Temporal)   Resp 18   Ht 157.5 cm (62\")   Wt 80.7 kg (178 lb)   SpO2 100%   BMI 32.56 kg/mý   Physical Exam  Vitals and nursing note reviewed.   Constitutional:       " Appearance: Normal appearance.   HENT:      Head: Normocephalic and atraumatic.      Right Ear: External ear normal.      Left Ear: External ear normal.      Nose: Nose normal.      Mouth/Throat:      Mouth: Mucous membranes are moist.      Pharynx: Oropharynx is clear.   Eyes:      General: No scleral icterus.     Extraocular Movements: Extraocular movements intact.      Conjunctiva/sclera: Conjunctivae normal.      Pupils: Pupils are equal, round, and reactive to light.   Cardiovascular:      Rate and Rhythm: Normal rate and regular rhythm.      Heart sounds: No murmur heard.  Pulmonary:      Effort: Pulmonary effort is normal.      Breath sounds: Normal breath sounds.   Abdominal:      General: Bowel sounds are normal.      Palpations: Abdomen is soft.   Musculoskeletal:         General: Normal range of motion.      Cervical back: Normal range of motion and neck supple.   Skin:     General: Skin is warm and dry.      Capillary Refill: Capillary refill takes less than 2 seconds.   Neurological:      General: No focal deficit present.      Mental Status: She is alert and oriented to person, place, and time.   Psychiatric:         Mood and Affect: Mood normal.         Behavior: Behavior normal.         Discharge Disposition:  Psychiatric Hospital or Unit (DC - External)    Discharge Medications:     Discharge Medications        New Medications        Instructions Start Date   Lurasidone HCl 20 MG tablet tablet  Commonly known as: LATUDA   20 mg, Oral, Daily With Dinner             Continue These Medications        Instructions Start Date   amiodarone 200 MG tablet  Commonly known as: PACERONE   200 mg, Oral, Daily      clonazePAM 1 MG tablet  Commonly known as: KlonoPIN   1 mg, Oral, 2 Times Daily PRN      pantoprazole 40 MG EC tablet  Commonly known as: PROTONIX   40 mg, Oral, Daily      pregabalin 25 MG capsule  Commonly known as: LYRICA   25 mg, Oral, 2 Times Daily      warfarin 6 MG tablet  Commonly known as:  COUMADIN   6 mg, Oral, Daily Warfarin, 3 mg on Tuesday, Thursday, and Saturday              Stop These Medications      Cariprazine HCl 4.5 MG capsule capsule  Commonly known as: VRAYLAR     famotidine 40 MG tablet  Commonly known as: PEPCID     furosemide 20 MG tablet  Commonly known as: LASIX     hydroCHLOROthiazide 25 MG tablet  Commonly known as: HYDRODIURIL     hydrOXYzine 25 MG tablet  Commonly known as: ATARAX     levalbuterol 45 MCG/ACT inhaler  Commonly known as: XOPENEX HFA     lidocaine 5 %  Commonly known as: LIDODERM     lisdexamfetamine 50 MG capsule  Commonly known as: VYVANSE     Magnesium Oxide -Mg Supplement 400 (240 Mg) MG tablet     metoprolol succinate XL 50 MG 24 hr tablet  Commonly known as: TOPROL-XL     potassium chloride 10 MEQ CR capsule  Commonly known as: MICRO-K     prazosin 2 MG capsule  Commonly known as: MINIPRESS     temazepam 7.5 MG capsule  Commonly known as: RESTORIL     tiZANidine 4 MG tablet  Commonly known as: ZANAFLEX              Discharge Diet:   Diet Instructions       Diet: Cardiac Diets; Healthy Heart (2-3 Na+); Regular Texture (IDDSI 7); Thin (IDDSI 0)      Discharge Diet: Cardiac Diets    Cardiac Diet: Healthy Heart (2-3 Na+)    Texture: Regular Texture (IDDSI 7)    Fluid Consistency: Thin (IDDSI 0)            Activity at Discharge:   Activity Instructions       Activity as Tolerated              Discharge Care Plan/Instructions:  Discharged to behavioral health today.  Follow-up with primary care provider within 1 week of discharge from behavioral health  Activity is as tolerated.  Diet is cardiac      Follow-up Appointments:   No future appointments.    Test Results Pending at Discharge: None    Chapo Edmonds DO    Time: Discharge took greater than 35 minutes to complete

## 2023-08-23 NOTE — PLAN OF CARE
Goal Outcome Evaluation:  Plan of Care Reviewed With: patient        Progress: no change  Outcome Evaluation: Patient to be transferred to behavioral health.

## 2023-08-23 NOTE — NURSING NOTE
Patient from 3 rd floor [facility]  Patient was wanded for contraband  and none found.  Patient was taken to rm 660 and skin checked and put on blue scrubs.  Patient was given the rules and regulations of the unit.  Will continue to monitor.

## 2023-08-23 NOTE — PROGRESS NOTES
"Anticoagulation by Pharmacy - Warfarin    Wing Hickman is a 53 y.o.female being continued on warfarin for atrial fibrillation    Home regimen: Warfarin 6 mg and warfarin 3 mg alternating days (per patient)  INR Goal: 2-3  Last INR:   Lab Results   Component Value Date    INR 3.32 (H) 08/23/2023       Objective:  [Ht: 157.5 cm (62\"); Wt: 80.7 kg (178 lb)]  Lab Results   Component Value Date    INR 3.32 (H) 08/23/2023    INR 2.55 (H) 08/22/2023    INR 1.87 (H) 08/21/2023    PROTIME 32.7 (H) 08/23/2023    PROTIME 26.9 (H) 08/22/2023    PROTIME 21.2 (H) 08/21/2023     Lab Results   Component Value Date    HGB 12.6 08/23/2023    HGB 13.0 08/22/2023    HGB 14.1 08/21/2023    HCT 39.1 08/23/2023    HCT 40.1 08/22/2023    HCT 43.5 08/21/2023     08/23/2023     (L) 08/22/2023     (L) 08/21/2023       Recent Warfarin Administrations     The 5 most recent administrations since 08/16/2023 are shown below each listed medication.    Warfarin Sodium       Order Dose Date Given     warfarin (COUMADIN) tablet 6 mg 6 mg 08/21/2023                Assessment  Interacting medications: amiodarone  INR is 3.32, supratherapeutic   H&H and platelets WNL   No signs or symptoms of bleeding noted     Warfarin dose was held on 8/22.    Plan:  1.  Continue to hold warfarin tonight.  2.  Draw a PT/INR in AM  3.  Pharmacy will continue to follow    Pierre Amezquita RPH  08/23/23 14:32 CDT   "

## 2023-08-23 NOTE — NURSING NOTE
Patient came to the unit for SI.  Patient had stopped eating and drinking and wanted to die.  Patient lost her  , her therapy dog, and her home.

## 2023-08-24 PROBLEM — K21.9 GERD (GASTROESOPHAGEAL REFLUX DISEASE): Status: ACTIVE | Noted: 2023-08-24

## 2023-08-24 PROBLEM — F32.A DEPRESSION: Status: RESOLVED | Noted: 2020-08-30 | Resolved: 2023-08-24

## 2023-08-24 PROBLEM — F22 NIHILISM: Status: ACTIVE | Noted: 2023-08-24

## 2023-08-24 PROBLEM — R45.89: Status: ACTIVE | Noted: 2023-08-24

## 2023-08-24 PROBLEM — I48.20 CHRONIC ATRIAL FIBRILLATION: Status: ACTIVE | Noted: 2023-08-24

## 2023-08-24 PROBLEM — R45.89 INEFFECTIVE COPING: Status: ACTIVE | Noted: 2023-08-24

## 2023-08-24 PROCEDURE — 99233 SBSQ HOSP IP/OBS HIGH 50: CPT | Performed by: PSYCHIATRY & NEUROLOGY

## 2023-08-24 PROCEDURE — 90836 PSYTX W PT W E/M 45 MIN: CPT | Performed by: PSYCHIATRY & NEUROLOGY

## 2023-08-24 RX ORDER — PRAZOSIN HYDROCHLORIDE 1 MG/1
2 CAPSULE ORAL NIGHTLY
Status: DISCONTINUED | OUTPATIENT
Start: 2023-08-24 | End: 2023-09-06

## 2023-08-24 RX ORDER — TEMAZEPAM 7.5 MG/1
7.5 CAPSULE ORAL NIGHTLY PRN
Status: DISCONTINUED | OUTPATIENT
Start: 2023-08-24 | End: 2023-09-02

## 2023-08-24 RX ORDER — TEMAZEPAM 7.5 MG/1
7.5 CAPSULE ORAL NIGHTLY
Status: DISCONTINUED | OUTPATIENT
Start: 2023-08-24 | End: 2023-09-02

## 2023-08-24 RX ORDER — CLONAZEPAM 0.5 MG/1
0.5 TABLET ORAL 2 TIMES DAILY PRN
Status: DISCONTINUED | OUTPATIENT
Start: 2023-08-24 | End: 2023-08-28

## 2023-08-24 RX ADMIN — TEMAZEPAM 7.5 MG: 7.5 CAPSULE ORAL at 20:10

## 2023-08-24 RX ADMIN — PREGABALIN 25 MG: 25 CAPSULE ORAL at 20:10

## 2023-08-24 RX ADMIN — PRAZOSIN HYDROCHLORIDE 2 MG: 1 CAPSULE ORAL at 20:10

## 2023-08-24 RX ADMIN — HYDROXYZINE PAMOATE 50 MG: 50 CAPSULE ORAL at 00:13

## 2023-08-24 NOTE — NURSING NOTE
Behavior   Note any precipitants to event or behavior   Describe level and action of any aggressive behavior or speech and associated interventions.     Anxiety: Excess Worry, Restless/Edgy, and Easily fatigued  Depression: depressed mood and fatigue  Pain  0  AVH   no  S/I   no  Plan  no  H/I   no  Plan  no    Affect   mood-congruent      Note: Patient resting in bed when this nurse entered room. Patient denied SI, HI and AVH. Patient reports anxiety 8/10 and depression 10/10.       Intervention    PRN medication utilized:  no    Instructed in medication usage and effects  Medications administered as ordered  Encouraged to verbalize needs      Response    Verbalized understanding   Did patient take medications as ordered yes   Did patient interact with assessment?  yes     Plan    Will monitor for safety  Will monitor every 15 minutes as ordered  Will evaluate and promote the plan of care    Last BM:    (Please chart in I/O as well)

## 2023-08-24 NOTE — PLAN OF CARE
Goal Outcome Evaluation:  Plan of Care Reviewed With: patient  Patient Agreement with Plan of Care: agrees     Outcome Evaluation: Patient has slept approximately 1 hour and 45 minutes. Patient reports she slept for 3 days while she had ANADNA. Patient reports she is bipolar and will sleep for a few days and then not be able to sleep for a few days. Patient denied SI.

## 2023-08-24 NOTE — PLAN OF CARE
Goal Outcome Evaluation:  Plan of Care Reviewed With: patient  Patient Agreement with Plan of Care: refuses to participate     Progress: no change  Outcome Evaluation: Patient continues to refuse PT/INR labs, stated to nurse that she didn't care if she . Refused meals, meds, and group therapy this shift.

## 2023-08-24 NOTE — SIGNIFICANT NOTE
"Pt asked to meet with this MSW. States \"I'm not going to be a guinea pig and take medications or do any more blood work.\" Pt states, \"I have no control over anything and I don't need to be here.\" Pt denies SI, but then states, \"I don't want to die, but I wish I wouldn't wake up, and stop asking if I have a plan, because I don't, if I did have a plan I would slit my wrist.\" Pt crying and hysterical. MSW unable to calm pt. Pt asking this MSW about her medications and encouraged pt to discuss medications with RN or MD. Pt said, \"no one listens, I just need to go and no one cares.\" Pt flailing arms in air and animated at this time. Pt refused to come out of her room and sit in day room with other peers. Pt said, \"I'm not the crazy one.\"     NATHANIEL Oliveira notified.     Dr. Maria notified.   "

## 2023-08-24 NOTE — PROGRESS NOTES
"Anticoagulation by Pharmacy - Warfarin    Wing Hickman is a 53 y.o.female who has been consulted for warfarin for atrial fibrillation.    Home regimen: Warfarin 6 mg MWFSun and 3 mg TThSat (Average daily warfarin each week: 5 mg/day)   INR Goal: 2-3    Objective:  [Ht: 157.5 cm (62.01\"); Wt: 80.7 kg (178 lb)]    Lab Results   Component Value Date    INR 3.32 (H) 08/23/2023    INR 2.55 (H) 08/22/2023    INR 1.87 (H) 08/21/2023    PROTIME 32.7 (H) 08/23/2023    PROTIME 26.9 (H) 08/22/2023    PROTIME 21.2 (H) 08/21/2023     Lab Results   Component Value Date    HGB 12.6 08/23/2023    HGB 13.0 08/22/2023    HGB 14.1 08/21/2023    HCT 39.1 08/23/2023    HCT 40.1 08/22/2023    HCT 43.5 08/21/2023     08/23/2023     (L) 08/22/2023     (L) 08/21/2023       Recent Warfarin Administrations       The 5 most recent administrations since 08/17/2023 are shown below each listed medication.    Warfarin Sodium         Order Dose Date Given     warfarin (COUMADIN) tablet 6 mg 6 mg 08/21/2023                    Assessment  H/H/plts 12.6/39.1/153, stable. No signs or symptoms of bleeding noted.   Interacting medications: none  Patient refused INR today. Patient's last dose was 6 mg of warfarin on 8/21. Without doses her INR was 2.55 8/22 and 3.32 on 8/23.     Plan:  Due to increasing INR without doses and lack of INR today will HOLD warfarin dose.   PT/INR ordered daily  Pharmacy will continue to follow    Thank you,     Carina Patel, Pharm.D.   8/24/2023  15:56 CDT    "

## 2023-08-24 NOTE — NURSING NOTE
"Lab has attempted multiple times to draw pts blood for protime this day with pt refusing . This nurse attempted to educate pt on why she needed to have protime drawn with pt saying she is aware that med is used for blood thinning et that if she doesn't have blood drawn she will not know how thick her blood is. She said she is aware that she could develop blood clots. Pt says that she is aware that if she develops blood clots she could die. Pt stated \"at this point, I don't care if I live or die\". She then went on to say that she would not be taking any meds, having blood drawn or eating while she was here on U. This nurse et  left room.   "
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
## 2023-08-24 NOTE — PLAN OF CARE
"  Problem: Adult Behavioral Health Plan of Care  Goal: Plan of Care Review  Outcome: Ongoing, Progressing  Flowsheets (Taken 8/24/2023 1311)  Consent Given to Review Plan with: [facility] FOR SAFETY PLANNING  Progress: no change  Plan of Care Reviewed With: patient  Patient Agreement with Plan of Care: agrees  Outcome Evaluation: NEW SW EVAL  Goal: Patient-Specific Goal (Individualization)  Outcome: Ongoing, Progressing  Flowsheets  Taken 8/24/2023 1311  Patient-Specific Goals (Include Timeframe): PT TO NOT EXPRESS SI/HI/AVH AND VERBALIZE 1-3 COPING SKILLS AT TIME OF DISCHARGE  Individualized Care Needs: GROUP SESSIONS, FAMILY SESSION, EDUCATION, SAFETY PLANNING AND AFTERCARE  Anxieties, Fears or Concerns: \"I WANT TO GO HOME\"  Taken 8/24/2023 1304  Patient Personal Strengths:   expressive of emotions   resilient   independent living skills  Patient Vulnerabilities:   adverse childhood experience(s)   history of unsuccessful treatment   recent loss   lacks insight into illness   poor impulse control   food insecurity  Goal: Optimized Coping Skills in Response to Life Stressors  Outcome: Ongoing, Progressing  Flowsheets (Taken 8/24/2023 1311)  Optimized Coping Skills in Response to Life Stressors: making progress toward outcome  Intervention: Promote Effective Coping Strategies  Flowsheets (Taken 8/24/2023 1311)  Supportive Measures:   active listening utilized   verbalization of feelings encouraged  Goal: Develops/Participates in Therapeutic [place] to Support Successful Transition  Outcome: Ongoing, Progressing  Flowsheets (Taken 8/24/2023 1311)  Develops/Participates in Therapeutic [place] to Support Successful Transition: making progress toward outcome  Intervention: Foster Therapeutic [place]  Flowsheets (Taken 8/24/2023 1311)  Trust Relationship/Rapport:   care explained   reassurance provided   choices provided   thoughts/feelings acknowledged   emotional support provided   empathic listening provided   questions " answered   questions encouraged  Intervention: Mutually Develop Transition Plan  Flowsheets  Taken 8/24/2023 1311 by Annabelle Nevarez  Outpatient/Agency/Support Group Needs:   outpatient psychiatric care (specify)   outpatient counseling  Transition Support: follow-up care discussed  Anticipated Discharge Disposition: home or self-care  Taken 8/24/2023 1302 by Annabelle Nevarez  Discharge Coordination/Progress: [facility]  Transportation Anticipated:   family or friend will provide   health plan transportation  Current Discharge Risk: psychiatric illness  Concerns to be Addressed:   grief and loss   financial/insurance   medication   mental health   discharge planning   decision-making   coping/stress  Readmission Within the Last 30 Days: no previous admission in last 30 days  Patient/Family Anticipated Services at Transition:   mental health services      outpatient care  Patient's Choice of Community Agency(s): [facility]  Patient/Family Anticipates Transition to: home  Offered/Gave Vendor List: no  Taken 8/23/2023 1604 by Sandra Wilkins, RN  Transportation Concerns: other (see comments)     Problem: Suicide Risk  Goal: Absence of Self-Harm  Intervention: Promote Psychosocial Wellbeing  Recent Flowsheet Documentation  Taken 8/24/2023 1311 by Annabelle Nevarez  Supportive Measures:   active listening utilized   verbalization of feelings encouraged       Goal Outcome Evaluation:  Plan of Care Reviewed With: patient  Patient Agreement with Plan of Care: agrees  Consent Given to Review Plan with: [facility]BAR FOR SAFETY PLANNING  Progress: no change  Outcome Evaluation: NEW SW EVAL

## 2023-08-24 NOTE — NURSING NOTE
Behavior   Note any precipitants to event or behavior   Describe level and action of any aggressive behavior or speech and associated interventions.     Anxiety: Excess Worry, Easily fatigued, and Decreased concentration  Depression: depressed mood, difficulty concentrating, and hopelessness  Pain  0  AVH   ERENDIRA  S/I   ERENDIRA  Plan  ERENDIRA  H/I   ERENDIRA  Plan  ERENDIRA    Affect   flat      Note: Patient uncooperative with assessment. Rolled over in bed and refused to speak to staff. Refused AM meds.       Intervention    PRN medication utilized:  no    Instructed in medication usage and effects  Medications administered as ordered  Encouraged to verbalize needs      Response    Verbalized understanding   Did patient take medications as ordered no  Did patient interact with assessment?  no    Plan    Will monitor for safety  Will monitor every 15 minutes as ordered  Will evaluate and promote the plan of care    Last BM:  unknown date  (Please chart in I/O as well)
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
## 2023-08-24 NOTE — CONSULTS
[facility] Medicine Admission      Date of Admission: 8/23/2023      Primary Care Physician: Leanne Sarmiento APRN      Chief Complaint: No complaints    HPI: This is a 53-year-old female with past medical history of depression, chronic atrial fibrillation, aortic valve replacement, DM 2, HTN and hypothyroidism that presented to the behavioral health unit on 8/23/2023 after receiving treatment for dehydration and acute kidney injury.    Patient has no medical complaints today.    Concurrent Medical History:  has a past medical history of Anemia, Arthritis, Asthma, CHF (congestive heart failure), Depression, Diabetes mellitus, Disease of thyroid gland, History of transfusion, Hypertension, Injury of back, Kidney stone, MDRO (multiple drug resistant organisms) resistance, Migraine, and Seizures.    Past Surgical History:  has a past surgical history that includes Aortic valve replacement (1995); Tricuspid valve surgery (2014); Hysterectomy; Gastric bypass (2002); Cardiac catheterization; and Breast biopsy (Right).    Family History: family history is not on file.    Social History:  reports that she has never smoked. She has never used smokeless tobacco. She reports that she does not currently use alcohol. She reports that she does not currently use drugs.    Allergies:   Allergies   Allergen Reactions    Ace Inhibitors Other (See Comments)     Throat and Lips swelling.     Capsaicin Hives    Nsaids Unknown - Low Severity     Due to having heart surgery and pacemaker    Quetiapine Unknown - Low Severity       Medications:   Prior to Admission medications    Medication Sig Start Date End Date Taking? Authorizing Provider   amiodarone (PACERONE) 200 MG tablet Take 1 tablet by mouth Daily. 6/29/23 6/29/24 Yes Provider, MD Rosalie   clonazePAM (KlonoPIN) 1 MG tablet Take 1 tablet by mouth 2 (Two) Times a Day As Needed.   Yes Provider, MD Rosalie   Lurasidone HCl (LATUDA) 20 MG tablet tablet Take 1  tablet by mouth Daily With Dinner. 8/23/23  Yes Chapo Edmonds DO   pantoprazole (PROTONIX) 40 MG EC tablet Take 1 tablet by mouth Daily.   Yes ProviderRosalie MD   pregabalin (LYRICA) 25 MG capsule Take 1 capsule by mouth 2 (Two) Times a Day.   Yes Rosalie Ortega MD   warfarin (COUMADIN) 6 MG tablet Take 1 tablet by mouth Daily. 3 mg on Tuesday, Thursday, and Saturday   Yes ProviderRosalie MD       Review of Systems:  Review of Systems   Constitutional:  Negative for activity change and fatigue.   HENT:  Negative for ear pain and sore throat.    Eyes:  Negative for pain and discharge.   Respiratory:  Negative for cough and shortness of breath.    Cardiovascular:  Negative for chest pain and palpitations.   Gastrointestinal:  Negative for abdominal pain and nausea.   Endocrine: Negative for cold intolerance and heat intolerance.   Genitourinary:  Negative for difficulty urinating and dysuria.   Musculoskeletal:  Negative for arthralgias and gait problem.   Skin:  Negative for color change and rash.   Neurological:  Negative for dizziness and weakness.   Psychiatric/Behavioral:  Positive for suicidal ideas. Negative for agitation and confusion.     Otherwise complete ROS is negative except as mentioned above.    Physical Exam:   Temp:  [98.1 °F (36.7 °C)] 98.1 °F (36.7 °C)  Heart Rate:  [95] 95  Resp:  [18] 18  BP: (151)/(74) 151/74  Physical Exam  Constitutional:       Appearance: She is well-developed.   HENT:      Head: Normocephalic and atraumatic.   Eyes:      Pupils: Pupils are equal, round, and reactive to light.   Cardiovascular:      Rate and Rhythm: Normal rate and regular rhythm.   Pulmonary:      Effort: Pulmonary effort is normal.      Breath sounds: Normal breath sounds.   Abdominal:      General: Bowel sounds are normal.      Palpations: Abdomen is soft.   Musculoskeletal:         General: Normal range of motion.      Cervical back: Normal range of motion and neck supple.    Skin:     General: Skin is warm and dry.   Neurological:      Mental Status: She is alert and oriented to person, place, and time.     CN I: Sense of smell intact  CN II: Visual fields intact  CN III,IV,VI: extraocular movements intact  CN V: Masseter strength and sensation in all three divisions intact  CN VII: Smile and eyelid closure symmetrical  CN VIII: Hearing intact  CN IX and X: Voice and palate movement intact  CN XI: Shoulder shrug intact  CN XII: Tongue protrusion and movement intact      Results Reviewed:  I have personally reviewed current lab, radiology, and data and agree with results.  Lab Results (last 24 hours)       ** No results found for the last 24 hours. **          Imaging Results (Last 24 Hours)       ** No results found for the last 24 hours. **              Assessment:    Active Hospital Problems    Diagnosis     **Suicidal ideation          Plan:  Suicidal ideation: Continue therapy with psychiatry.  Atrial fibrillation, chronic: Continue home amiodarone and Coumadin, pharmacy to dose.  GERD: Continue PPI.        This document has been electronically signed by DESIREE Hood on August 24, 2023 18:29 CDT

## 2023-08-25 ENCOUNTER — APPOINTMENT (OUTPATIENT)
Dept: CT IMAGING | Facility: HOSPITAL | Age: 53
DRG: 885 | End: 2023-08-25
Payer: MEDICARE

## 2023-08-25 LAB
ALBUMIN SERPL-MCNC: 3.3 G/DL (ref 3.5–5.2)
ALBUMIN/GLOB SERPL: 1.1 G/DL
ALP SERPL-CCNC: 99 U/L (ref 39–117)
ALT SERPL W P-5'-P-CCNC: 7 U/L (ref 1–33)
ANION GAP SERPL CALCULATED.3IONS-SCNC: 9 MMOL/L (ref 5–15)
AST SERPL-CCNC: 15 U/L (ref 1–32)
BILIRUB SERPL-MCNC: 1 MG/DL (ref 0–1.2)
BUN SERPL-MCNC: 13 MG/DL (ref 6–20)
BUN/CREAT SERPL: 13 (ref 7–25)
CALCIUM SPEC-SCNC: 8.9 MG/DL (ref 8.6–10.5)
CHLORIDE SERPL-SCNC: 108 MMOL/L (ref 98–107)
CO2 SERPL-SCNC: 24 MMOL/L (ref 22–29)
CREAT SERPL-MCNC: 1 MG/DL (ref 0.57–1)
EGFRCR SERPLBLD CKD-EPI 2021: 68.3 ML/MIN/1.73
GLOBULIN UR ELPH-MCNC: 2.9 GM/DL
GLUCOSE SERPL-MCNC: 83 MG/DL (ref 65–99)
INR PPP: 2.79 (ref 0.8–1.2)
POTASSIUM SERPL-SCNC: 3.9 MMOL/L (ref 3.5–5.2)
PROT SERPL-MCNC: 6.2 G/DL (ref 6–8.5)
PROTHROMBIN TIME: 28.7 SECONDS (ref 11.1–15.3)
QT INTERVAL: 492 MS
QTC INTERVAL: 495 MS
SODIUM SERPL-SCNC: 141 MMOL/L (ref 136–145)

## 2023-08-25 PROCEDURE — 85610 PROTHROMBIN TIME: CPT | Performed by: PSYCHIATRY & NEUROLOGY

## 2023-08-25 PROCEDURE — 70450 CT HEAD/BRAIN W/O DYE: CPT

## 2023-08-25 PROCEDURE — 93005 ELECTROCARDIOGRAM TRACING: CPT | Performed by: PSYCHIATRY & NEUROLOGY

## 2023-08-25 PROCEDURE — 80053 COMPREHEN METABOLIC PANEL: CPT | Performed by: PSYCHIATRY & NEUROLOGY

## 2023-08-25 PROCEDURE — 99233 SBSQ HOSP IP/OBS HIGH 50: CPT | Performed by: PSYCHIATRY & NEUROLOGY

## 2023-08-25 PROCEDURE — 93010 ELECTROCARDIOGRAM REPORT: CPT | Performed by: INTERNAL MEDICINE

## 2023-08-25 RX ORDER — KETAMINE HYDROCHLORIDE 10 MG/ML
0.4 INJECTION INTRAMUSCULAR; INTRAVENOUS ONCE
Status: DISCONTINUED | OUTPATIENT
Start: 2023-08-26 | End: 2023-08-26

## 2023-08-25 RX ORDER — WARFARIN SODIUM 3 MG/1
3 TABLET ORAL
Status: COMPLETED | OUTPATIENT
Start: 2023-08-25 | End: 2023-08-25

## 2023-08-25 RX ADMIN — PANTOPRAZOLE SODIUM 40 MG: 40 TABLET, DELAYED RELEASE ORAL at 08:23

## 2023-08-25 RX ADMIN — LURASIDONE HYDROCHLORIDE 20 MG: 20 TABLET, FILM COATED ORAL at 17:42

## 2023-08-25 RX ADMIN — PREGABALIN 25 MG: 25 CAPSULE ORAL at 20:19

## 2023-08-25 RX ADMIN — TEMAZEPAM 7.5 MG: 7.5 CAPSULE ORAL at 21:46

## 2023-08-25 RX ADMIN — AMIODARONE HYDROCHLORIDE 200 MG: 200 TABLET ORAL at 08:22

## 2023-08-25 RX ADMIN — WARFARIN SODIUM 3 MG: 3 TABLET ORAL at 17:42

## 2023-08-25 RX ADMIN — PRAZOSIN HYDROCHLORIDE 2 MG: 1 CAPSULE ORAL at 20:19

## 2023-08-25 RX ADMIN — PREGABALIN 25 MG: 25 CAPSULE ORAL at 08:22

## 2023-08-25 NOTE — MEDICAL STUDENT
"Psychiatry Progress Note    8/25/2023    Legal Status: Involuntary Hold    Chief Complaint: suicidal ideation and depression    Subjective:  Patient is a 53 y.o. female who was hospitalized for suicidal ideation and depression.     Pt was seen this morning in the [facility] area. Pt reports she still has minimal appetite and energy. Pt reports eating parts of her snack last night and breakfast today. Pt states she feels depressed and would rather be with her  because she misses him, but states she is hopeful the Ketamine treatment will be helpful if given.     Pt reports she did not experience nightmares last night. Pt states she did have trouble sleeping due to being startled easily causing her to wake up in the middle of the night.      Patient reports medications are tolerable.    Objective     Vital Signs    Vitals:    08/23/23 1604 08/23/23 1900 08/24/23 1900 08/25/23 0715   BP:  151/74 114/78 134/78   BP Location:  Right arm Right arm Right arm   Patient Position:  Sitting Sitting Sitting   Pulse:  95 84 81   Resp:  18 18 18   Temp:  98.1 °F (36.7 °C) 98.1 °F (36.7 °C) 98 °F (36.7 °C)   TempSrc:  Tympanic Tympanic Temporal   SpO2:  99% 96% 100%   Weight: 80.7 kg (178 lb)      Height: 157.5 cm (62.01\")          Physical Exam: as of today, 08/25/23   General Appearance: alert, appears stated age, and cooperative,  Hygiene:   fair  Gait & Station: Normal  Musculoskeletal: No tremors or abnormal involuntary movements      Lab Results: Results source: EMR   Lab Results (last 24 hours)       Procedure Component Value Units Date/Time    Protime-INR [[id number]]  (Abnormal) Collected: 08/25/23 0529    Specimen: Blood Updated: 08/25/23 0637     Protime 28.7 Seconds      INR 2.79    Narrative:      Therapeutic range for most indications is 2.0-3.0 INR,  or 2.5-3.5 for mechanical heart valves.    Comprehensive Metabolic Panel [[id number]]  (Abnormal) Collected: 08/25/23 0529    Specimen: Blood Updated: 08/25/23 0608     " Glucose 83 mg/dL      BUN 13 mg/dL      Creatinine 1.00 mg/dL      Sodium 141 mmol/L      Potassium 3.9 mmol/L      Chloride 108 mmol/L      CO2 24.0 mmol/L      Calcium 8.9 mg/dL      Total Protein 6.2 g/dL      Albumin 3.3 g/dL      ALT (SGPT) 7 U/L      AST (SGOT) 15 U/L      Alkaline Phosphatase 99 U/L      Total Bilirubin 1.0 mg/dL      Globulin 2.9 gm/dL      A/G Ratio 1.1 g/dL      BUN/Creatinine Ratio 13.0     Anion Gap 9.0 mmol/L      eGFR 68.3 mL/min/1.73     Narrative:      GFR Normal >60  Chronic Kidney Disease <60  Kidney Failure <15              Radiology Results:  Imaging Results (Last 24 Hours)       Procedure Component Value Units Date/Time    CT Head Without Contrast [[id number]] Collected: 08/25/23 0759     Updated: 08/25/23 0803    Narrative:      Indication:  Worsening cognitive symptoms.  Clearance for ketamine therapy or ECT    TECHNIQUE:  Axial images were performed from the calvarium through the base of the skull.    COMPARISON:  8/31/2020    FINDINGS:  No acute intracranial hemorrhage, parenchymal abnormality or hydrocephalus is  seen. Visualized paranasal sinuses and mastoid air cells are clear.      Impression:      No acute intracranial abnormality seen              Medicine:   Current Facility-Administered Medications:     acetaminophen (TYLENOL) tablet 650 mg, 650 mg, Oral, Q4H PRN, Curtis Maria II, MD    aluminum-magnesium hydroxide-simethicone (MAALOX MAX) 400-400-40 MG/5ML suspension 15 mL, 15 mL, Oral, Q6H PRN, Curtis Maria II, MD    amiodarone (PACERONE) tablet 200 mg, 200 mg, Oral, Daily, Curtis Maria II, MD, 200 mg at 08/25/23 0822    clonazePAM (KlonoPIN) tablet 0.5 mg, 0.5 mg, Oral, BID PRN, Curtis Maria II, MD    cloNIDine (CATAPRES) tablet 0.1 mg, 0.1 mg, Oral, Q4H PRN, Curtis Maria II, MD    hydrOXYzine pamoate (VISTARIL) capsule 50 mg, 50 mg, Oral, Q6H PRN, Curtis Maria II, MD, 50 mg at 08/24/23 0013    loperamide  (IMODIUM) capsule 2 mg, 2 mg, Oral, Q2H PRN, Curtis Maria II, MD    Lurasidone HCl (LATUDA) tablet 20 mg, 20 mg, Oral, Daily With Dinner, Curtis Maria II, MD, 20 mg at 08/23/23 1809    magnesium hydroxide (MILK OF MAGNESIA) suspension 10 mL, 10 mL, Oral, Daily PRN, Curtis Maria II, MD    ondansetron ODT (ZOFRAN-ODT) disintegrating tablet 4 mg, 4 mg, Oral, Q6H PRN, Curtis Maria II, MD    pantoprazole (PROTONIX) EC tablet 40 mg, 40 mg, Oral, Daily, Curtis Maria II, MD, 40 mg at 08/25/23 0823    Pharmacy to dose warfarin, , Does not apply, Continuous PRN, Curtis Maria II, MD    Pharmacy to dose warfarin, , Does not apply, Continuous PRN, Omaira Willoughby GDESIREE    prazosin (MINIPRESS) capsule 2 mg, 2 mg, Oral, Nightly, Curtis Maria II, MD, 2 mg at 08/24/23 2010    pregabalin (LYRICA) capsule 25 mg, 25 mg, Oral, BID, Curtis Maria II, MD, 25 mg at 08/25/23 0822    temazepam (RESTORIL) capsule 7.5 mg, 7.5 mg, Oral, Nightly, Curtis Maria II, MD, 7.5 mg at 08/24/23 2010    temazepam (RESTORIL) capsule 7.5 mg, 7.5 mg, Oral, Nightly PRN, Curtis Maria II, MD    Diagnoses/Assessment:     Suicidal ideation    Bipolar I disorder, most recent episode depressed, severe without psychotic features    Bereavement    Post traumatic stress disorder (PTSD)    Problems of guilt    Nihilism    Chronic atrial fibrillation    GERD (gastroesophageal reflux disease)    Ineffective coping      Treatment Plan:    1) Will continue care for the patient on the behavioral health unit at [facility] to ensure patient safety.  2) Will continue to provide treatment with the unit milieu, activities, therapies and psychopharmacological management.           Marie Linn, Medical Student  08/25/23 at 08:58 CDT          See NP/Attending note from this date for full information.     Curtis Maria II, MD  09/02/23 @ 2:10 PM CDT

## 2023-08-25 NOTE — PROGRESS NOTES
"Anticoagulation by Pharmacy - Warfarin    Wing Hickman is a 53 y.o.female who has been consulted for warfarin for atrial fibrillation.    Home regimen: Warfarin 6 mg MWFSun and 3 mg TThSat (Average daily warfarin each week: 5 mg/day)   INR Goal: 2-3    Objective:  [Ht: 157.5 cm (62.01\"); Wt: 80.7 kg (178 lb)]    Lab Results   Component Value Date    INR 2.79 (H) 08/25/2023    INR 3.32 (H) 08/23/2023    INR 2.55 (H) 08/22/2023    PROTIME 28.7 (H) 08/25/2023    PROTIME 32.7 (H) 08/23/2023    PROTIME 26.9 (H) 08/22/2023     Lab Results   Component Value Date    HGB 12.6 08/23/2023    HGB 13.0 08/22/2023    HGB 14.1 08/21/2023    HCT 39.1 08/23/2023    HCT 40.1 08/22/2023    HCT 43.5 08/21/2023     08/23/2023     (L) 08/22/2023     (L) 08/21/2023       Recent Warfarin Administrations       The 5 most recent administrations since 08/17/2023 are shown below each listed medication.    Warfarin Sodium         Order Dose Date Given     warfarin (COUMADIN) tablet 6 mg 6 mg 08/21/2023                    Assessment  H/H/plts 12.6/39.1/153, last checked 8/23. No signs or symptoms of bleeding noted.   Interacting medications: none  INR 2.79 today.     Plan:  Give warfarin 3 mg mg PO @ 1800 tonight  PT/INR ordered daily  Ordered CBC for tomorrow AM.   Pharmacy will continue to follow    Thank you,     Carina Patel, Pharm.D.   8/25/2023  12:24 CDT    "

## 2023-08-25 NOTE — PROGRESS NOTES
"Met with pt[place] [facility] [place]. Pt did showed minimal engagement and states, \"there is too much down time, and I just need to go.\" Pt refused to discuss anything further with MSW.   "

## 2023-08-25 NOTE — PROGRESS NOTES
"Psychiatry Progress Note    8/25/2023    Legal Status: Involuntary; Hold expires 8/30/2023 0800    Chief Complaint: suicidal ideation    Subjective:  Patient is a 53 y.o. female who was hospitalized for suicidal ideation.    Patient is seen in treatment team. Patient reports she slept well overnight. However, she then states she gets startled sometimes and afterwards it is difficult to fall back asleep.    Patient states she has had thoughts of wanting to be with her  who passed away 6 months ago. She also states the loss of her home and her therapy dog have made her feel hopeless with no reason to live. Patient expresses interest in ketamine treatment for her depression.    Patient denies current SI/HI/AVH. Patient reports decreased appetite, loss of energy and interest. Patient has been compliant with medications. Patient has attended group sessions. Patient states she feels there is too much \"down time\".       Objective     Vital Signs    Vitals:    08/23/23 1604 08/23/23 1900 08/24/23 1900 08/25/23 0715   BP:  151/74 114/78 134/78   BP Location:  Right arm Right arm Right arm   Patient Position:  Sitting Sitting Sitting   Pulse:  95 84 81   Resp:  18 18 18   Temp:  98.1 °F (36.7 °C) 98.1 °F (36.7 °C) 98 °F (36.7 °C)   TempSrc:  Tympanic Tympanic Temporal   SpO2:  99% 96% 100%   Weight: 80.7 kg (178 lb)      Height: 157.5 cm (62.01\")          Physical Exam: as of today, 08/25/23   General Appearance: alert and cooperative,  Hygiene:   fair  Gait & Station: Normal  Musculoskeletal: No tremors or abnormal involuntary movements    Mental Status Exam: as of today, 08/25/23   Cooperation:  Cooperative  Eye Contact:  Downcast  Psychomotor Behavior:  Appropriate  Mood: Sad/Depressed and Dysphoric  Affect:  mood-congruent  Speech:  Normal  Thought Process:  Coherent  Associations: Circumstantial  Thought Content:     Mood congruent   Suicidal:   Denies   Homicidal:  None   Hallucinations:  None   Delusion:  " None  Cognitive Functioning:   Consciousness: awake and alert  Reliability:   adequate  Insight:   limited  Judgement:   limited  Impulse Control:  Impaired    Lab Results: Results source: EMR   Lab Results (last 24 hours)       Procedure Component Value Units Date/Time    Protime-INR [[id number]]  (Abnormal) Collected: 08/25/23 0529    Specimen: Blood Updated: 08/25/23 0637     Protime 28.7 Seconds      INR 2.79    Narrative:      Therapeutic range for most indications is 2.0-3.0 INR,  or 2.5-3.5 for mechanical heart valves.    Comprehensive Metabolic Panel [[id number]]  (Abnormal) Collected: 08/25/23 0529    Specimen: Blood Updated: 08/25/23 0608     Glucose 83 mg/dL      BUN 13 mg/dL      Creatinine 1.00 mg/dL      Sodium 141 mmol/L      Potassium 3.9 mmol/L      Chloride 108 mmol/L      CO2 24.0 mmol/L      Calcium 8.9 mg/dL      Total Protein 6.2 g/dL      Albumin 3.3 g/dL      ALT (SGPT) 7 U/L      AST (SGOT) 15 U/L      Alkaline Phosphatase 99 U/L      Total Bilirubin 1.0 mg/dL      Globulin 2.9 gm/dL      A/G Ratio 1.1 g/dL      BUN/Creatinine Ratio 13.0     Anion Gap 9.0 mmol/L      eGFR 68.3 mL/min/1.73     Narrative:      GFR Normal >60  Chronic Kidney Disease <60  Kidney Failure <15              Radiology Results:  Imaging Results (Last 24 Hours)       Procedure Component Value Units Date/Time    CT Head Without Contrast [[id number]] Collected: 08/25/23 0759     Updated: 08/25/23 0803    Narrative:      Indication:  Worsening cognitive symptoms.  Clearance for ketamine therapy or ECT    TECHNIQUE:  Axial images were performed from the calvarium through the base of the skull.    COMPARISON:  8/31/2020    FINDINGS:  No acute intracranial hemorrhage, parenchymal abnormality or hydrocephalus is  seen. Visualized paranasal sinuses and mastoid air cells are clear.      Impression:      No acute intracranial abnormality seen              Medicine:   Current Facility-Administered Medications:     acetaminophen  (TYLENOL) tablet 650 mg, 650 mg, Oral, Q4H PRN, Curtis Maria II, MD    aluminum-magnesium hydroxide-simethicone (MAALOX MAX) 400-400-40 MG/5ML suspension 15 mL, 15 mL, Oral, Q6H PRN, Curtis Maria II, MD    amiodarone (PACERONE) tablet 200 mg, 200 mg, Oral, Daily, Curtis Maria II, MD, 200 mg at 08/25/23 0822    clonazePAM (KlonoPIN) tablet 0.5 mg, 0.5 mg, Oral, BID PRN, Curtis Maria II, MD    cloNIDine (CATAPRES) tablet 0.1 mg, 0.1 mg, Oral, Q4H PRN, Curtis Maria II, MD    hydrOXYzine pamoate (VISTARIL) capsule 50 mg, 50 mg, Oral, Q6H PRN, Curtis Maria II, MD, 50 mg at 08/24/23 0013    loperamide (IMODIUM) capsule 2 mg, 2 mg, Oral, Q2H PRN, Curtis Maria II, MD    Lurasidone HCl (LATUDA) tablet 20 mg, 20 mg, Oral, Daily With Dinner, Curtis Maria II, MD, 20 mg at 08/23/23 1809    magnesium hydroxide (MILK OF MAGNESIA) suspension 10 mL, 10 mL, Oral, Daily PRN, Curtis Maria II, MD    ondansetron ODT (ZOFRAN-ODT) disintegrating tablet 4 mg, 4 mg, Oral, Q6H PRN, Curtis Maria II, MD    pantoprazole (PROTONIX) EC tablet 40 mg, 40 mg, Oral, Daily, Curtis Maria II, MD, 40 mg at 08/25/23 0823    Pharmacy to dose warfarin, , Does not apply, Continuous PRN, Curtis Maria II, MD    Pharmacy to dose warfarin, , Does not apply, Continuous PRN, Omaira Willoughby G, APRN    prazosin (MINIPRESS) capsule 2 mg, 2 mg, Oral, Nightly, Curtis Maria II, MD, 2 mg at 08/24/23 2010    pregabalin (LYRICA) capsule 25 mg, 25 mg, Oral, BID, Curtis Maria II, MD, 25 mg at 08/25/23 0822    temazepam (RESTORIL) capsule 7.5 mg, 7.5 mg, Oral, Nightly, Curtis Maria II, MD, 7.5 mg at 08/24/23 2010    temazepam (RESTORIL) capsule 7.5 mg, 7.5 mg, Oral, Nightly PRN, Curtis Maria II, MD    warfarin (COUMADIN) tablet 3 mg, 3 mg, Oral, Once, Marcela Manriquez MD    Diagnoses/Assessment:     Suicidal ideation    Bipolar I  disorder, most recent episode depressed, severe without psychotic features    Bereavement    Post traumatic stress disorder (PTSD)    Problems of guilt    Nihilism    Chronic atrial fibrillation    GERD (gastroesophageal reflux disease)    Ineffective coping      Treatment Plan:    1) Will continue care for the patient on the behavioral health unit at [facility] to ensure patient safety.  2) Will continue to provide treatment with the unit milieu, activities, therapies and psychopharmacological management.  3) Patient to be placed on or continued on  Q15 minute checks  and Suicide precautions.  4) Pertinent medical issues:   --Atrial fibrillation, chronic:   --Continue home amiodarone and Coumadin, pharmacy to dose. Paced.  --GERD:   --Continue PPI.  --ANANDA resolved, downtrending Cr  5) Will order following labs: reviewed  6) Will make the following medication changes:   Bipolar d/o, PTSD, guilt, grief, SI  --Restart Latuda 20mg qDinner   --Patient is aamendable to consideration of Ketamine. Will start series over the weekend.   7) Will continue discharge planning for patient: outpatient psychiatric care, outpatient medical care, safety planning and placement as appropriate.    Treatment plan and medication risks and benefits discussed with: Patient and treatment team    Cherelle Hill, APRN  08/25/23 at 15:30 CDT

## 2023-08-25 NOTE — PLAN OF CARE
Treatment team met with patient to discuss and review treatment plan. Pt was encouraged to discuss their reason for admission, as well as their goals for treatment. Team discussed anticipated interventions and treatment modalities that will be used to address goals.Pt given opportunity to discuss any concerns re: treatment plan.    Patient acknowledges that she may be interested in ketamine treatment. Discussed plan for treatment and goals.     Team Members present during meeting:    MD: Candace SIERRAN: Cherelle  RN: Iván  SW: Katelyn  RT:  Other: Maribel Apodaca carol

## 2023-08-25 NOTE — PLAN OF CARE
Goal Outcome Evaluation:  Plan of Care Reviewed With: patient  Patient Agreement with Plan of Care: agrees     Progress: no change  Outcome Evaluation: Patient was more interactive tonight up in common area with peers. Patient has slept approximately 6-6.5 hours and voices no concerns or complaints at this time.

## 2023-08-25 NOTE — NURSING NOTE
Behavior   Note any precipitants to event or behavior   Describe level and action of any aggressive behavior or speech and associated interventions.     Anxiety: Excess Worry, Easily fatigued, and Decreased concentration  Depression: depressed mood, fatigue, difficulty concentrating, and decreased appetite  Pain  0  AVH   no  S/I   no  Plan  no  H/I   no  Plan  no    Affect   flat      Note: Patient sitting up in common area for snack during conversation. Patient voices anxiety and depression. Patient denies SI, HI and AVH. Patient took medications as prescribed this afternoon without difficulty after seeing MD. Patient mood improved from this morning.       Intervention    PRN medication utilized:  no    Instructed in medication usage and effects  Medications administered as ordered  Encouraged to verbalize needs      Response    Verbalized understanding   Did patient take medications as ordered yes   Did patient interact with assessment?  yes     Plan    Will monitor for safety  Will monitor every 15 minutes as ordered  Will evaluate and promote the plan of care    Last BM:  unknown date  (Please chart in I/O as well)

## 2023-08-25 NOTE — NURSING NOTE
Behavior   Note any precipitants to event or behavior   Describe level and action of any aggressive behavior or speech and associated interventions.     Anxiety: Excess Worry  Depression: depressed mood  Pain  0  AVH   no  S/I   no  Plan  no  H/I   no  Plan  no    Affect   flat      Note: Patient continues to endorse anxiety and depression. Patient continues to have flat affect and appear withdrawn. Patient is preoccupied with potential upcoming ketamine infusions. She currently denies SI/HI/AVH. Patient took medications as ordered and interacted appropriately with staff.       Intervention    PRN medication utilized:  no    Instructed in medication usage and effects  Medications administered as ordered  Encouraged to verbalize needs      Response    Verbalized understanding   Did patient take medications as ordered yes   Did patient interact with assessment?  yes     Plan    Will monitor for safety  Will monitor every 15 minutes as ordered  Will evaluate and promote the plan of care    Last BM:  unknown date  (Please chart in I/O as well)

## 2023-08-25 NOTE — PLAN OF CARE
Problem: Adult Behavioral Health Plan of Care  Goal: Plan of Care Review  Outcome: Ongoing, Progressing  Flowsheets (Taken 8/25/2023 1605)  Consent Given to Review Plan with: Refused  Progress: no change  Plan of Care Reviewed With: patient  Patient Agreement with Plan of Care: refuses to participate  Outcome Evaluation: Pt refused to meet with MSW. Unable to assess for SI\HI\AVH at this time.  Goal: Patient-Specific Goal (Individualization)  Outcome: Ongoing, Progressing  Flowsheets (Taken 8/25/2023 1605)  Patient Personal Strengths:   ability to maintain sobriety   resilient   stable living environment  Patient-Specific Goals (Include Timeframe): pt to have no SI\HI\AVH at time of d\c and to verbalize 1-3 learned coping skills  Individualized Care Needs: group, education, safety planning, aftecare, family session  Anxieties, Fears or Concerns: refused to discuss  Patient Vulnerabilities:   adverse childhood experience(s)   family/relationship conflict   lacks insight into illness   poor physical health   poor impulse control   recent loss   limited social skills   limited support system   occupational insecurity   housing insecurity   history of unsuccessful treatment  Goal: Optimized Coping Skills in Response to Life Stressors  Outcome: Ongoing, Progressing  Flowsheets (Taken 8/25/2023 1605)  Optimized Coping Skills in Response to Life Stressors: making progress toward outcome  Intervention: Promote Effective Coping Strategies  Flowsheets (Taken 8/25/2023 1605)  Supportive Measures:   active listening utilized   verbalization of feelings encouraged  Goal: Develops/Participates in Therapeutic [place] to Support Successful Transition  Outcome: Ongoing, Progressing  Flowsheets (Taken 8/25/2023 1605)  Develops/Participates in Therapeutic [place] to Support Successful Transition: making progress toward outcome  Intervention: Foster Therapeutic [place]  Flowsheets (Taken 8/25/2023 1605)  Trust  Relationship/Rapport: care explained  Intervention: Mutually Develop Transition Plan  Flowsheets (Taken 8/25/2023 1605)  Outpatient/Agency/Support Group Needs:   case management   intensive outpatient services   outpatient medication management   outpatient counseling  Discharge Coordination/Progress: [facility] or [facility]  Transition Support: (pt refused) other (see comments)  Transportation Anticipated: public transportation  Anticipated Discharge Disposition: homeless  Transportation Concerns: no car  Current Discharge Risk:   psychiatric illness   lack of support system/caregiver  Concerns to be Addressed:   patient refuses services   adjustment to diagnosis/illness   basic needs   decision-making   coping/stress   compliance issue   cognitive/perceptual   care coordination/care conferences   discharge planning   grief and loss   relationship   mental health   medication   suicidal  Readmission Within the Last 30 Days: no previous admission in last 30 days  Patient/Family Anticipated Services at Transition:      mental health services   outpatient care  Patient's Choice of Community Agency(s): [facility] or [facility]  Patient/Family Anticipates Transition to: (TBD) other (see comments)  Offered/Gave Vendor List: no     Problem: Suicide Risk  Goal: Absence of Self-Harm  Intervention: Promote Psychosocial Wellbeing  Recent Flowsheet Documentation  Taken 8/25/2023 1605 by Katelyn Wolfe MSW  Supportive Measures:   active listening utilized   verbalization of feelings encouraged     Goal Outcome Evaluation:  Plan of Care Reviewed With: patient  Patient Agreement with Plan of Care: refuses to participate  Consent Given to Review Plan with: Refused  Progress: no change  Outcome Evaluation: Pt refused to meet with MSW. Unable to assess for SI\HI\AVH at this time.

## 2023-08-26 LAB
DEPRECATED RDW RBC AUTO: 41.6 FL (ref 37–54)
ERYTHROCYTE [DISTWIDTH] IN BLOOD BY AUTOMATED COUNT: 12.8 % (ref 12.3–15.4)
HCT VFR BLD AUTO: 41.6 % (ref 34–46.6)
HGB BLD-MCNC: 13.7 G/DL (ref 12–15.9)
HOLD SPECIMEN: NORMAL
INR PPP: 2.18 (ref 0.8–1.2)
MCH RBC QN AUTO: 29.5 PG (ref 26.6–33)
MCHC RBC AUTO-ENTMCNC: 32.9 G/DL (ref 31.5–35.7)
MCV RBC AUTO: 89.5 FL (ref 79–97)
PLATELET # BLD AUTO: 175 10*3/MM3 (ref 140–450)
PMV BLD AUTO: 11.5 FL (ref 6–12)
PROTHROMBIN TIME: 23.8 SECONDS (ref 11.1–15.3)
RBC # BLD AUTO: 4.65 10*6/MM3 (ref 3.77–5.28)
WBC NRBC COR # BLD: 4.71 10*3/MM3 (ref 3.4–10.8)

## 2023-08-26 PROCEDURE — 85027 COMPLETE CBC AUTOMATED: CPT | Performed by: PSYCHIATRY & NEUROLOGY

## 2023-08-26 PROCEDURE — 85610 PROTHROMBIN TIME: CPT | Performed by: PSYCHIATRY & NEUROLOGY

## 2023-08-26 PROCEDURE — 99233 SBSQ HOSP IP/OBS HIGH 50: CPT | Performed by: PSYCHIATRY & NEUROLOGY

## 2023-08-26 RX ORDER — WARFARIN SODIUM 3 MG/1
3 TABLET ORAL
Status: DISCONTINUED | OUTPATIENT
Start: 2023-08-26 | End: 2023-08-31

## 2023-08-26 RX ORDER — SODIUM CHLORIDE 9 MG/ML
INJECTION, SOLUTION INTRAVENOUS
Status: DISPENSED
Start: 2023-08-26 | End: 2023-08-26

## 2023-08-26 RX ADMIN — PANTOPRAZOLE SODIUM 40 MG: 40 TABLET, DELAYED RELEASE ORAL at 08:13

## 2023-08-26 RX ADMIN — PREGABALIN 25 MG: 25 CAPSULE ORAL at 20:05

## 2023-08-26 RX ADMIN — PRAZOSIN HYDROCHLORIDE 2 MG: 1 CAPSULE ORAL at 20:05

## 2023-08-26 RX ADMIN — LURASIDONE HYDROCHLORIDE 20 MG: 20 TABLET, FILM COATED ORAL at 18:32

## 2023-08-26 RX ADMIN — TEMAZEPAM 7.5 MG: 7.5 CAPSULE ORAL at 20:05

## 2023-08-26 RX ADMIN — KETAMINE HYDROCHLORIDE 32.3 MG: 50 INJECTION INTRAMUSCULAR; INTRAVENOUS at 09:31

## 2023-08-26 RX ADMIN — CLONAZEPAM 0.5 MG: 0.5 TABLET ORAL at 20:05

## 2023-08-26 RX ADMIN — AMIODARONE HYDROCHLORIDE 200 MG: 200 TABLET ORAL at 08:13

## 2023-08-26 RX ADMIN — PREGABALIN 25 MG: 25 CAPSULE ORAL at 08:13

## 2023-08-26 RX ADMIN — WARFARIN SODIUM 3 MG: 3 TABLET ORAL at 18:32

## 2023-08-26 NOTE — PROGRESS NOTES
"Anticoagulation by Pharmacy - Warfarin    Wing Hickman is a 53 y.o.female who has been consulted for warfarin for atrial fibrillation.     Home regimen: Warfarin 6 mg PO MWFSun and 3 mg TTSat  INR Goal: 2-3    Objective:  [Ht: 157.5 cm (62.01\"); Wt: 80.7 kg (178 lb)]    Lab Results   Component Value Date    INR 2.18 (H) 08/26/2023    INR 2.79 (H) 08/25/2023    INR 3.32 (H) 08/23/2023    PROTIME 23.8 (H) 08/26/2023    PROTIME 28.7 (H) 08/25/2023    PROTIME 32.7 (H) 08/23/2023     Lab Results   Component Value Date    HGB 13.7 08/26/2023    HGB 12.6 08/23/2023    HGB 13.0 08/22/2023    HCT 41.6 08/26/2023    HCT 39.1 08/23/2023    HCT 40.1 08/22/2023     08/26/2023     08/23/2023     (L) 08/22/2023       Recent Warfarin Administrations       The 5 most recent administrations since 08/19/2023 are shown below each listed medication.    Warfarin Sodium         Order Dose Date Given     warfarin (COUMADIN) tablet 3 mg 3 mg 08/25/2023     warfarin (COUMADIN) tablet 6 mg 6 mg 08/21/2023                    Assessment  H/H/plts WNL. No signs or symptoms of bleeding noted.   Interacting medications: amiodarone  INR is therapeutic. Will continue current dose.     Plan:  Give warfarin 3 mg PO @ 1800 tonight  PT/INR ordered daily.   Pharmacy will continue to follow    Thank you for this consult.     Ciro Medley, [facility]  08/26/23 14:18 CDT     "

## 2023-08-26 NOTE — NURSING NOTE
Behavior   Note any precipitants to event or behavior   Describe level and action of any aggressive behavior or speech and associated interventions.     Anxiety: Excess Worry  Depression: depressed mood and hopelessness  Pain  0  AVH   no  S/I   no  Plan  no  H/I   no  Plan  no    Affect   flat      Note: Ketamine infusion initiated at 0931. Currently, patient is tolerating well. No adverse reactions noted at this time. Patient continues to c/o anxiety, depression, and some hopelessness. Patient denies SI/HI/AVH at this time.      Intervention    PRN medication utilized:  no    Instructed in medication usage and effects  Medications administered as ordered  Encouraged to verbalize needs      Response    Verbalized understanding   Did patient take medications as ordered yes   Did patient interact with assessment?  yes     Plan    Will monitor for safety  Will monitor every 15 minutes as ordered  Will evaluate and promote the plan of care    Last BM:  unknown date  (Please chart in I/O as well)

## 2023-08-26 NOTE — SIGNIFICANT NOTE
"Pre-Ketamine infusion note.     NPO since midnight.  Telemetry at bedside for BP, HR, O2 sat, and ECG monitoring.      /64 (BP Location: Right arm, Patient Position: Sitting)   Pulse 60   Temp 97 °F (36.1 °C) (Temporal)   Resp 20   Ht 157.5 cm (62.01\")   Wt 80.7 kg (178 lb)   SpO2 100%   BMI 32.55 kg/m²      Moving forward with 0.4mg/kg over 45-50 min infusion window.        Curtis Maria II, MD  08/26/23 @ 9:24 AM CDT      Tolerating infusion well.  Began at 0931.  HR Stable in the 59-62 range.  /67.  No AE.  Moving forward with full infusion.  NATHANIEL Minor and myself at bedside.      Curtis Maria II, MD  08/26/23 @ 9:45 AM CDT      Completed infusion well.  Some somnolence towards end of infusion.  Modified Aldarate Score 10.     Curtis Maria II, MD  08/26/23 @ 10:42 AM CDT      "

## 2023-08-26 NOTE — NURSING NOTE
Behavior   Note any precipitants to event or behavior   Describe level and action of any aggressive behavior or speech and associated interventions.     Anxiety: Decreased concentration  Depression: depressed mood and difficulty concentrating  Pain  0  AVH   no  S/I   no  Plan  no  H/I   no  Plan  no    Affect   flat      Note:  Patient seen in common area. Was calm and cooperative, took all of her medications.  She voices depression but denies SI.  No prn medications given.        Intervention    PRN medication utilized:  no    Instructed in medication usage and effects  Medications administered as ordered  Encouraged to verbalize needs      Response    Verbalized understanding   Did patient take medications as ordered yes   Did patient interact with assessment?  yes     Plan    Will monitor for safety  Will monitor every 15 minutes as ordered  Will evaluate and promote the plan of care    Last BM:  unknown date  (Please chart in I/O as well)

## 2023-08-26 NOTE — PLAN OF CARE
Goal Outcome Evaluation:     Patient Agreement with Plan of Care: agrees     Progress: no change  Outcome Evaluation: Patient remains depressed stating nothing she does works.  She is agreeable to ketamine infusion so she hopes that will help.  No prn medications given this shift.  She has slept 5 hours so far this shift.

## 2023-08-27 LAB
INR PPP: 2.46 (ref 0.8–1.2)
PROTHROMBIN TIME: 26.1 SECONDS (ref 11.1–15.3)
QT INTERVAL: 532 MS
QT INTERVAL: 558 MS
QTC INTERVAL: 532 MS
QTC INTERVAL: 561 MS

## 2023-08-27 PROCEDURE — 85610 PROTHROMBIN TIME: CPT | Performed by: PSYCHIATRY & NEUROLOGY

## 2023-08-27 PROCEDURE — 90833 PSYTX W PT W E/M 30 MIN: CPT | Performed by: PSYCHIATRY & NEUROLOGY

## 2023-08-27 PROCEDURE — 99232 SBSQ HOSP IP/OBS MODERATE 35: CPT | Performed by: PSYCHIATRY & NEUROLOGY

## 2023-08-27 RX ORDER — LURASIDONE HYDROCHLORIDE 40 MG/1
40 TABLET, FILM COATED ORAL
Status: DISCONTINUED | OUTPATIENT
Start: 2023-08-27 | End: 2023-08-29

## 2023-08-27 RX ORDER — VENLAFAXINE HYDROCHLORIDE 37.5 MG/1
37.5 CAPSULE, EXTENDED RELEASE ORAL
Status: DISCONTINUED | OUTPATIENT
Start: 2023-08-27 | End: 2023-08-28

## 2023-08-27 RX ADMIN — PREGABALIN 25 MG: 25 CAPSULE ORAL at 20:00

## 2023-08-27 RX ADMIN — PANTOPRAZOLE SODIUM 40 MG: 40 TABLET, DELAYED RELEASE ORAL at 08:24

## 2023-08-27 RX ADMIN — PREGABALIN 25 MG: 25 CAPSULE ORAL at 08:23

## 2023-08-27 RX ADMIN — PRAZOSIN HYDROCHLORIDE 2 MG: 1 CAPSULE ORAL at 20:00

## 2023-08-27 RX ADMIN — LURASIDONE HYDROCHLORIDE 40 MG: 40 TABLET, FILM COATED ORAL at 19:55

## 2023-08-27 RX ADMIN — AMIODARONE HYDROCHLORIDE 200 MG: 200 TABLET ORAL at 08:23

## 2023-08-27 RX ADMIN — TEMAZEPAM 7.5 MG: 7.5 CAPSULE ORAL at 20:01

## 2023-08-27 RX ADMIN — WARFARIN SODIUM 3 MG: 3 TABLET ORAL at 19:55

## 2023-08-27 RX ADMIN — VENLAFAXINE HYDROCHLORIDE 37.5 MG: 37.5 CAPSULE, EXTENDED RELEASE ORAL at 12:59

## 2023-08-27 NOTE — PLAN OF CARE
"Goal Outcome Evaluation:     Patient Agreement with Plan of Care: agrees     Progress: improving    Outcome Evaluation: Patient remains depressed and anxious but did seem better this shift.  She was out of her room conversing with peers and smiling some.  She did have prn klonopin due to \"shaking\" which she states is either anxiety or a side effect from the ketamine.  She has slept 6.5 hours so far this shift.          "

## 2023-08-27 NOTE — PROGRESS NOTES
"Anticoagulation by Pharmacy - Warfarin    Wing Hickman is a 53 y.o.female who has been consulted for warfarin for atrial fibrillation.     Home regimen: Warfarin 6 mg PO MWFSun and 3 mg TTSat  INR Goal: 2-3    Objective:  [Ht: 157.5 cm (62.01\"); Wt: 80.7 kg (178 lb)]    Lab Results   Component Value Date    INR 2.46 (H) 08/27/2023    INR 2.18 (H) 08/26/2023    INR 2.79 (H) 08/25/2023    PROTIME 26.1 (H) 08/27/2023    PROTIME 23.8 (H) 08/26/2023    PROTIME 28.7 (H) 08/25/2023     Lab Results   Component Value Date    HGB 13.7 08/26/2023    HGB 12.6 08/23/2023    HGB 13.0 08/22/2023    HCT 41.6 08/26/2023    HCT 39.1 08/23/2023    HCT 40.1 08/22/2023     08/26/2023     08/23/2023     (L) 08/22/2023       Recent Warfarin Administrations       The 5 most recent administrations since 08/19/2023 are shown below each listed medication.    Warfarin Sodium         Order Dose Date Given     warfarin (COUMADIN) tablet 3 mg 3 mg 08/25/2023     warfarin (COUMADIN) tablet 6 mg 6 mg 08/21/2023                    Assessment  H/H/plts WNL. No signs or symptoms of bleeding noted.   Interacting medications: amiodarone, venlafaxine   INR is therapeutic. Will continue current dose. INR changed to every other day due to patient not wanting blood work every day per notes this morning.     Plan:  Give warfarin 3 mg PO @ 1800 tonight  PT/INR ordered every other day.   Pharmacy will continue to follow    Thank you for this consult.     Ciro Medley [facility]  08/27/23 12:25 CDT     "

## 2023-08-27 NOTE — PLAN OF CARE
Goal Outcome Evaluation:  Plan of Care Reviewed With: patient  Patient Agreement with Plan of Care: agrees     Progress: improving  Outcome Evaluation: Patient reports continued depression and suicidal thoughts when alone, patient has had no falls this shift, patient is journaling and working throught DBT worksheet, patient is socializing with peers

## 2023-08-27 NOTE — NURSING NOTE
"Patient approached the nurses stating this am stating \"I am done getting blood work!  I have been stuck every single day and my arms hurt and I'm bruised and I just cannot get this done every day anymore.  Tell whoever you have to that I'm therapeutic and I'm not doing anymore blood work.\"  "

## 2023-08-27 NOTE — NURSING NOTE
"Behavior   Note any precipitants to event or behavior   Describe level and action of any aggressive behavior or speech and associated interventions.     Anxiety: Excess Worry  Depression: depressed mood  Pain  0  AVH   no  S/I   yes   Plan  no  H/I   no  Plan  no    Affect   euthymic/normal      Note: Patient states she is still depressed and having suicidal thoughts. Patient states she is journaling and working on the DBT worksheets. Patient states she feels safe here but is concerned about when she returns home. Patient states she has \"distorted thinking due to people telling me negative things over and over and it has just become my belief\". Patient states she has been out of her room and socializing more.       Intervention    PRN medication utilized:  no    Instructed in medication usage and effects  Medications administered as ordered  Encouraged to verbalize needs      Response    Verbalized understanding   Did patient take medications as ordered yes   Did patient interact with assessment?  yes     Plan    Will monitor for safety  Will monitor every 15 minutes as ordered  Will evaluate and promote the plan of care    Last BM:  unknown date  (Please chart in I/O as well)    "

## 2023-08-27 NOTE — NURSING NOTE
Behavior   Note any precipitants to event or behavior   Describe level and action of any aggressive behavior or speech and associated interventions.     Anxiety: Restless/Edgy and Muscle tension  Depression: difficulty concentrating  Pain  0  AVH   no  S/I   no  Plan  no  H/I   no  Plan  no    Affect   euthymic/normal      Note:  Patient has been more interactive with peers this shift. She states she has felt more social since the ketamine infusion.  Her only complaint is anxiety and shaking which she is not sure if it is due to the events of the day causing stress or a side effect of the ketamine.  Given klonopin which she states was effective.        Intervention    PRN medication utilized:  yes - Klonopin    Instructed in medication usage and effects  Medications administered as ordered  Encouraged to verbalize needs      Response    Verbalized understanding   Did patient take medications as ordered yes   Did patient interact with assessment?  yes     Plan    Will monitor for safety  Will monitor every 15 minutes as ordered  Will evaluate and promote the plan of care    Last BM:  unknown date  (Please chart in I/O as well)

## 2023-08-28 ENCOUNTER — APPOINTMENT (OUTPATIENT)
Dept: ULTRASOUND IMAGING | Facility: HOSPITAL | Age: 53
DRG: 885 | End: 2023-08-28
Payer: MEDICARE

## 2023-08-28 PROCEDURE — 76937 US GUIDE VASCULAR ACCESS: CPT

## 2023-08-28 PROCEDURE — 99233 SBSQ HOSP IP/OBS HIGH 50: CPT | Performed by: PSYCHIATRY & NEUROLOGY

## 2023-08-28 RX ORDER — KETAMINE HYDROCHLORIDE 10 MG/ML
0.45 INJECTION INTRAMUSCULAR; INTRAVENOUS ONCE
Status: DISCONTINUED | OUTPATIENT
Start: 2023-08-28 | End: 2023-08-28

## 2023-08-28 RX ORDER — SODIUM CHLORIDE 9 MG/ML
INJECTION, SOLUTION INTRAVENOUS
Status: DISCONTINUED
Start: 2023-08-28 | End: 2023-08-28 | Stop reason: WASHOUT

## 2023-08-28 RX ORDER — CLONAZEPAM 0.5 MG/1
0.5 TABLET ORAL 2 TIMES DAILY PRN
Status: DISCONTINUED | OUTPATIENT
Start: 2023-08-28 | End: 2023-08-31

## 2023-08-28 RX ORDER — VENLAFAXINE HYDROCHLORIDE 75 MG/1
75 CAPSULE, EXTENDED RELEASE ORAL
Status: DISCONTINUED | OUTPATIENT
Start: 2023-08-29 | End: 2023-08-29

## 2023-08-28 RX ADMIN — SODIUM CHLORIDE 36.3 MG: 9 INJECTION, SOLUTION INTRAVENOUS at 11:50

## 2023-08-28 RX ADMIN — CLONAZEPAM 0.5 MG: 0.5 TABLET ORAL at 02:00

## 2023-08-28 RX ADMIN — LURASIDONE HYDROCHLORIDE 40 MG: 40 TABLET, FILM COATED ORAL at 17:19

## 2023-08-28 RX ADMIN — PANTOPRAZOLE SODIUM 40 MG: 40 TABLET, DELAYED RELEASE ORAL at 08:14

## 2023-08-28 RX ADMIN — ACETAMINOPHEN 650 MG: 325 TABLET, FILM COATED ORAL at 02:00

## 2023-08-28 RX ADMIN — VENLAFAXINE HYDROCHLORIDE 37.5 MG: 37.5 CAPSULE, EXTENDED RELEASE ORAL at 08:14

## 2023-08-28 RX ADMIN — TEMAZEPAM 7.5 MG: 7.5 CAPSULE ORAL at 20:59

## 2023-08-28 RX ADMIN — PREGABALIN 25 MG: 25 CAPSULE ORAL at 08:14

## 2023-08-28 RX ADMIN — AMIODARONE HYDROCHLORIDE 200 MG: 200 TABLET ORAL at 08:14

## 2023-08-28 RX ADMIN — PREGABALIN 25 MG: 25 CAPSULE ORAL at 20:59

## 2023-08-28 RX ADMIN — PRAZOSIN HYDROCHLORIDE 2 MG: 1 CAPSULE ORAL at 20:59

## 2023-08-28 RX ADMIN — WARFARIN SODIUM 3 MG: 3 TABLET ORAL at 17:19

## 2023-08-28 NOTE — PROGRESS NOTES
TWO PATIENT IDENTIFIERS WERE USED. THE PATIENT WAS DRAPED WITH A FULL BODY DRAPE AND THE PATIENT'S RIGHT ARM WAS PREPPED WITH CHLORA PREP. ULTRASOUND WAS USED TO LOCALIZE THERIGHT BASILIC VEIN. SUBCUTANEOUS TISSUE AT THE CATHETER SITE WAS INFILTRATED WITH 2% LIDOCAINE. UNDER ULTRASOUND GUIDANCE, THE VEIN WAS ACCESSED WITH A 21 GAUGE  NEEDLE. AN 0.018 WIRE WAS THEN THREADED THROUGH THE NEEDLE. THE 21 GAUGE NEEDLE WAS REMOVED AND A 4 Wolof SHEATH WAS PLACED OVER THE WIRE INTO THE VEIN.THE MIDLINE CATHETER WAS TRIMMED TO 20CM. THE MIDLINE CATHETER WAS THEN PLACED OVER THE WIRE INTO THE VEIN, THE SHEATH WAS PEELED AWAY, WIRE WAS REMOVED. CATHETER WAS FLUSHED WITH NORMAL SALINE AND CATHETER TIP APPLIED. BIOPATCH PLACED. CATHETER SECURED WITH STAT LOCK AND TEGADERM. PATIENT TOLERATED PROCEDURE WELL. THIS WAS DONE IN THE ANGIOSUITE      IMPRESSION:SUCCESSFUL PLACEMENT OF SINGLE LUMEN MIDLINE.           Lashanda Mcdonald  8/28/2023  10:08 CDT

## 2023-08-28 NOTE — PLAN OF CARE
Goal Outcome Evaluation:  Plan of Care Reviewed With: patient  Patient Agreement with Plan of Care: agrees     Progress: improving  Outcome Evaluation: Pt is medication compliant and denies SI/HI/AVH. She reports some inprovements with ketamine treatments. She has been eating and sleeping well and interacting with peers. She continues to be free of falls.

## 2023-08-28 NOTE — PROGRESS NOTES
Psychiatry Progress Note   8/28/2023      HD: #5  Legal:  Hold --> Vol    Chief Complaint: Bipolar Depression      Subjective --  Ms. Wing Hickman is a 53 y.o. female who was seen on the Adult unit.       Today, on 08/28/23:    Seen for ketamine infusion.  Tolerated well.  No AE to medications.      Ongoing depression, severe, impairing, constant.  Slowly improving.         Review of Systems:  --Neuro: Denies headache  --GI: Denies stomachache        Objective   Objective --    Vital Signs:  Temp:  [98.1 °F (36.7 °C)-98.6 °F (37 °C)] 98.4 °F (36.9 °C)  Heart Rate:  [59-62] 60  Resp:  [16-18] 18  BP: (111-134)/(57-72) 117/67    Patient Vitals for the past 24 hrs:   BP Temp Temp src Pulse Resp SpO2   08/28/23 1310 117/67 -- -- 60 18 99 %   08/28/23 1300 121/72 -- -- 59 16 99 %   08/28/23 1250 120/69 98.4 °F (36.9 °C) Temporal 62 18 99 %   08/28/23 1240 116/66 -- -- 59 18 99 %   08/28/23 1230 115/68 98.6 °F (37 °C) Temporal 60 18 99 %   08/28/23 1220 111/67 -- -- 60 18 99 %   08/28/23 1210 119/63 -- -- 60 18 99 %   08/28/23 1200 117/68 -- -- 59 18 99 %   08/28/23 1150 113/64 -- -- 59 18 100 %   08/28/23 1140 119/61 98.4 °F (36.9 °C) Temporal 60 18 100 %   08/28/23 0759 116/57 98.1 °F (36.7 °C) Temporal 60 18 99 %   08/27/23 1900 134/65 98.4 °F (36.9 °C) Temporal 60 18 100 %             Today, on 08/28/23:    Physical Exam:   -General Appearance:  alert and in NAD  -Hygiene:  Adequate   -Gait & Station:  Normal  -Musculoskeletal:  No tremors or abnormal involuntary movements and No atrophy noted  -Pulm: unlaboured     Mental Status Exam:   --Cooperation:  Cooperative  --Eye Contact:  Fair  --Psychomotor Behavior:  Slow and Improving  --Mood:  Worried  --Affect:  heavily constricted, mood-congruent to mood and thought processing, and improving  --Speech:  Normal r/r/v  --Thought Process:  Coherent and Sluggish  --Associations: Goal Directed and Circumstantial  --Themes:  Helplessness and Hopelessness  --Thought  Content:     --Bizarre and Mood congruent   --Suicidal:  Nihilistic    --Homicidal:  Denies   --Hallucinations:  Denies   --Delusion:  None noted/overt  --Cognitive Functioning:  --Consciousness: awake and alert  Orientation:  Person, Place, Time, and Situation  --Reliability:  adequate  --Insight:  Diminished and Improving  --Judgment:  Diminished and Improving  --Impulse Control:  Diminished and Improving      Labs & Imaging  Lab Results (last 24 hours)       ** No results found for the last 24 hours. **          Results source: EMR    Imaging Results (Last 24 Hours)       Procedure Component Value Units Date/Time    US Guided Vascular Access [[id number]] Collected: 08/28/23 1009     Updated: 08/28/23 1605    Narrative:      Targeted grayscale ultrasound with compression of the right basilic vein  demonstrates patency.    IR Insert Midline Without Port Pump 5 Plus [[id number]] Resulted: 08/28/23 1008     Updated: 08/28/23 1008    Narrative:      This procedure was auto-finalized with no dictation required.          Results source: EMR      Hospital Medications:   Scheduled Meds:amiodarone, 200 mg, Oral, Daily  lurasidone, 40 mg, Oral, Daily With Dinner  pantoprazole, 40 mg, Oral, Daily  prazosin, 2 mg, Oral, Nightly  pregabalin, 25 mg, Oral, BID  temazepam, 7.5 mg, Oral, Nightly  venlafaxine XR, 37.5 mg, Oral, Daily With Breakfast  warfarin, 3 mg, Oral, Daily      Continuous Infusions:Pharmacy to dose warfarin,       PRN Meds:.  acetaminophen    aluminum-magnesium hydroxide-simethicone    clonazePAM    cloNIDine    hydrOXYzine pamoate    loperamide    magnesium hydroxide    ondansetron ODT    Pharmacy to dose warfarin    temazepam      Assessment:     Suicidal ideation    Bipolar I disorder, most recent episode depressed, severe without psychotic features    Bereavement    Post traumatic stress disorder (PTSD)    Problems of guilt    Nihilism    Chronic atrial fibrillation    GERD (gastroesophageal reflux disease)     Ineffective coping              Treatment Plan:  1) Will continue care for the patient on the behavioral health unit at [facility].      2) Will continue to provide treatment with the unit milieu, activities, therapies and psychopharmacological management.    3) Patient to be placed on or continued on  Q15 minute checks  and Suicide precautions.    4) Treatment Planning:  -Atrial fibrillation, chronic:   --Continue home amiodarone and Coumadin, pharmacy to dose. Paced.  --GERD:   --Continue PPI.  --ANANDA resolved, downtrending Cr    Bipolar d/o, PTSD, guilt, grief, SI  --Cont Latuda to 40mg qDinner   --Cont Prazosin 2 mg qhs  --Titrate Effexor XR to 75mg daily with breakfast     --> Disposition Planning: TBD    Treatment planning, along with medication benefits/risks/AE, alternatives discussed with: Patient and Treatment Team.    >50 min spent (60 m) on care today including direct care, coordination of care, Ketamine infusion related time, documentation, chart review.     Curtis Maria II, MD  08/28/23 @ 18:23 CDT

## 2023-08-28 NOTE — NURSING NOTE
Ketamine infusion complete. Patient tolerated well and is asleep at this time, will monitor. Vitals stable.

## 2023-08-28 NOTE — PROGRESS NOTES
"Met with pt this date in commons area. Pt reports she is ready for her second infusion tx this date. Pt states \"I am still having intrusive thoughts though.\" Pt states she talked with peer yesterday for \"six hours and we have so much in common.\" Pt stated, \"I have intrusive thoughts.\" Pt described this as \"I'm not good enough, and I wont do anything.\" Pt reports she feels very lonely and is afraid she will be evicted due to being hospitalized. Pt encouraged to attend group this date. Pt denies HI\AVH at this time   "

## 2023-08-28 NOTE — NURSING NOTE
Behavior   Note any precipitants to event or behavior   Describe level and action of any aggressive behavior or speech and associated interventions.     Anxiety: Excess Worry and Restless/Edgy  Depression: depressed mood  Pain  0  AVH   no  S/I   no  Plan  no  H/I   no  Plan  no    Affect   euthymic/normal      Note: Pt seen in the common area this AM. She is currently NPO but received morning medications. She is anxious about the ketamine infusion today. Pt had a midline places in the right arm. She is medication compliant and denies SI/HI/AVH.       Intervention    PRN medication utilized:  no    Instructed in medication usage and effects  Medications administered as ordered  Encouraged to verbalize needs      Response    Verbalized understanding   Did patient take medications as ordered yes   Did patient interact with assessment?  yes     Plan    Will monitor for safety  Will monitor every 15 minutes as ordered  Will evaluate and promote the plan of care    Last BM:  unknown date  (Please chart in I/O as well)

## 2023-08-28 NOTE — MEDICAL STUDENT
Psychiatry Progress Note    8/28/2023    Legal Status: Involuntary    Chief Complaint: suicidal ideation    Subjective:  Patient is a 53 y.o. female who was hospitalized for suicidal ideation.    Patient is seen in the [facility] area. Pt reports feeling anxious about the second treatment of Ketamine today but feels hopeful the treatment will help. Pt reports feeling lonely because she has no one to call or come visit her. Pt has been talking to other patients and states that has been helpful for her.     Pt reports she has been reading her handouts to build coping skills once she is discharged. Pt reports having decreased energy and appetite. Pt denies SI/HI/AVH.     Patient reports medications are tolerable.    Objective     Vital Signs    Vitals:    08/26/23 1900 08/27/23 0813 08/27/23 1900 08/28/23 0759   BP: 133/84 107/60 134/65 116/57   BP Location: Right arm Right arm Right arm Left arm   Patient Position: Sitting Sitting Sitting Sitting   Pulse: 60 65 60 60   Resp: 18 18 18 18   Temp: 97 °F (36.1 °C) 97.3 °F (36.3 °C) 98.4 °F (36.9 °C) 98.1 °F (36.7 °C)   TempSrc: Temporal Temporal Temporal Temporal   SpO2: 99% 100% 100% 99%   Weight:       Height:           Physical Exam: as of today, 08/28/23   General Appearance: alert, appears stated age, and cooperative,  Hygiene:   fair  Gait & Station: Normal  Musculoskeletal: No tremors or abnormal involuntary movements     Lab Results: Results source: EMR   Lab Results (last 24 hours)       ** No results found for the last 24 hours. **            Radiology Results:  Imaging Results (Last 24 Hours)       ** No results found for the last 24 hours. **            Medicine:   Current Facility-Administered Medications:     acetaminophen (TYLENOL) tablet 650 mg, 650 mg, Oral, Q4H PRN, Curtis Maria II, MD, 650 mg at 08/28/23 0200    aluminum-magnesium hydroxide-simethicone (MAALOX MAX) 400-400-40 MG/5ML suspension 15 mL, 15 mL, Oral, Q6H PRN, Curtis Maria II,  MD    amiodarone (PACERONE) tablet 200 mg, 200 mg, Oral, Daily, Curtis Maria II, MD, 200 mg at 08/28/23 0814    clonazePAM (KlonoPIN) tablet 0.5 mg, 0.5 mg, Oral, BID PRN, Curtis Maria II, MD, 0.5 mg at 08/28/23 0200    cloNIDine (CATAPRES) tablet 0.1 mg, 0.1 mg, Oral, Q4H PRN, Curtis Maria II, MD    hydrOXYzine pamoate (VISTARIL) capsule 50 mg, 50 mg, Oral, Q6H PRN, Curtis Maria II, MD, 50 mg at 08/24/23 0013    loperamide (IMODIUM) capsule 2 mg, 2 mg, Oral, Q2H PRN, Curtis Maria II, MD    lurasidone (LATUDA) tablet 40 mg, 40 mg, Oral, Daily With Dinner, Curtis Maria II, MD, 40 mg at 08/27/23 1955    magnesium hydroxide (MILK OF MAGNESIA) suspension 10 mL, 10 mL, Oral, Daily PRN, Curtis Marai II, MD    ondansetron ODT (ZOFRAN-ODT) disintegrating tablet 4 mg, 4 mg, Oral, Q6H PRN, Curtis Maria II, MD    pantoprazole (PROTONIX) EC tablet 40 mg, 40 mg, Oral, Daily, Curtis Maria II, MD, 40 mg at 08/28/23 0814    Pharmacy to dose warfarin, , Does not apply, Continuous PRN, Omaira Willoughby G, APRN    prazosin (MINIPRESS) capsule 2 mg, 2 mg, Oral, Nightly, Curtis Maria II, MD, 2 mg at 08/27/23 2000    pregabalin (LYRICA) capsule 25 mg, 25 mg, Oral, BID, Curtis Maria II, MD, 25 mg at 08/28/23 0814    temazepam (RESTORIL) capsule 7.5 mg, 7.5 mg, Oral, Nightly, Curtis Maria II, MD, 7.5 mg at 08/27/23 2001    temazepam (RESTORIL) capsule 7.5 mg, 7.5 mg, Oral, Nightly PRN, Curtis Maria II, MD    venlafaxine XR (EFFEXOR-XR) 24 hr capsule 37.5 mg, 37.5 mg, Oral, Daily With Breakfast, Curtis Maria II, MD, 37.5 mg at 08/28/23 0814    warfarin (COUMADIN) tablet 3 mg, 3 mg, Oral, Daily, Marcela Manriquez MD, 3 mg at 08/27/23 1955    Diagnoses/Assessment:     Suicidal ideation    Bipolar I disorder, most recent episode depressed, severe without psychotic features    Bereavement    Post traumatic stress disorder  (PTSD)    Problems of guilt    Nihilism    Chronic atrial fibrillation    GERD (gastroesophageal reflux disease)    Ineffective coping      Treatment Plan:    1) Will continue care for the patient on the behavioral health unit at [facility] to ensure patient safety.  2) Will continue to provide treatment with the unit milieu, activities, therapies and psychopharmacological management.       Treatment plan and medication risks and benefits discussed with: Patient and treatment team    Marie Linn, Medical Student  08/28/23 at 08:20 CDT            See NP/Attending note from this date for full information.     Curtis Maria II, MD  09/02/23 @ 2:10 PM CDT

## 2023-08-28 NOTE — PROGRESS NOTES
"Anticoagulation by Pharmacy - Warfarin    Wing Hickman is a 53 y.o.female who has been consulted for warfarin for atrial fibrillation.    Home regimen: Warfarin 6 mg MWFSun and 3 mg TThSat (Average daily warfarin each week: 5 mg/day)   INR Goal: 2-3    Objective:  [Ht: 157.5 cm (62.01\"); Wt: 80.7 kg (178 lb)]    Lab Results   Component Value Date    INR 2.46 (H) 08/27/2023    INR 2.18 (H) 08/26/2023    INR 2.79 (H) 08/25/2023    PROTIME 26.1 (H) 08/27/2023    PROTIME 23.8 (H) 08/26/2023    PROTIME 28.7 (H) 08/25/2023     Lab Results   Component Value Date    HGB 13.7 08/26/2023    HGB 12.6 08/23/2023    HGB 13.0 08/22/2023    HCT 41.6 08/26/2023    HCT 39.1 08/23/2023    HCT 40.1 08/22/2023     08/26/2023     08/23/2023     (L) 08/22/2023       Recent Warfarin Administrations       The 5 most recent administrations since 08/17/2023 are shown below each listed medication.    Warfarin Sodium         Order Dose Date Given     warfarin (COUMADIN) tablet 6 mg 6 mg 08/21/2023                    Assessment  H/H/plts 12.6/39.1/153, last checked 8/23. No signs or symptoms of bleeding noted.   Interacting medications: amiodarone and venlafaxine  INR 2.46 8/27.     Plan:  Give warfarin 3 mg mg PO @ 1800 tonight  PT/INR ordered every other day per patient request.   Ordered CBC for tomorrow AM.   Pharmacy will continue to follow    Thank you,     Carina Patel, Pharm.D.   8/28/2023  15:20 CDT    "

## 2023-08-28 NOTE — NURSING NOTE
Patient woke up after having a nightmare and stated she had a headache and increased anxiety.  Was given prn tylenol and klonopin.     Unable to leave message,pt to contact [facility],pt still has refills-

## 2023-08-28 NOTE — PLAN OF CARE
Problem: Adult Behavioral Health Plan of Care  Goal: Plan of Care Review  Outcome: Ongoing, Progressing  Flowsheets  Taken 8/28/2023 1351  Progress: no change  Plan of Care Reviewed With: patient  Patient Agreement with Plan of Care: agrees  Outcome Evaluation: Pt denies feelings of HI\AVH. Reports having SI at this time, no plan.  Taken 8/25/2023 1605  Consent Given to Review Plan with: Refused  Goal: Patient-Specific Goal (Individualization)  Outcome: Ongoing, Progressing  Flowsheets (Taken 8/25/2023 1605)  Patient Personal Strengths:   ability to maintain sobriety   resilient   stable living environment  Patient-Specific Goals (Include Timeframe): pt to have no SI\HI\AVH at time of d\c and to verbalize 1-3 learned coping skills  Individualized Care Needs: group, education, safety planning, aftecare, family session  Anxieties, Fears or Concerns: refused to discuss  Patient Vulnerabilities:   adverse childhood experience(s)   family/relationship conflict   lacks insight into illness   poor physical health   poor impulse control   recent loss   limited social skills   limited support system   occupational insecurity   housing insecurity   history of unsuccessful treatment  Goal: Optimized Coping Skills in Response to Life Stressors  Outcome: Ongoing, Progressing  Flowsheets (Taken 8/28/2023 1351)  Optimized Coping Skills in Response to Life Stressors: making progress toward outcome  Intervention: Promote Effective Coping Strategies  Flowsheets (Taken 8/28/2023 1351)  Supportive Measures:   active listening utilized   verbalization of feelings encouraged  Goal: Develops/Participates in Therapeutic [place] to Support Successful Transition  Outcome: Ongoing, Progressing  Flowsheets (Taken 8/28/2023 1351)  Develops/Participates in Therapeutic [place] to Support Successful Transition: making progress toward outcome  Intervention: Foster Therapeutic [place]  Flowsheets (Taken 8/28/2023 1351)  Trust  Relationship/Rapport:   thoughts/feelings acknowledged   care explained   reassurance provided   choices provided   emotional support provided   empathic listening provided   questions answered   questions encouraged  Intervention: Mutually Develop Transition Plan  Flowsheets  Taken 8/28/2023 1351  Transition Support: follow-up care discussed  Anticipated Discharge Disposition: home or self-care  Readmission Within the Last 30 Days: previous discharge plan unsuccessful  Offered/Gave Vendor List: no  Taken 8/25/2023 1605  Outpatient/Agency/Support Group Needs:   case management   intensive outpatient services   outpatient medication management   outpatient counseling  [facility]  Transportation Anticipated: public transportation  Transportation Concerns: no car  Current Discharge Risk:   psychiatric illness   lack of support system/caregiver  Concerns to be Addressed:   patient refuses services   adjustment to diagnosis/illness   basic needs   decision-making   coping/stress   compliance issue   cognitive/perceptual   care coordination/care conferences   discharge planning   grief and loss   relationship   mental health   medication   suicidal  Patient/Family Anticipated Services at Transition:      mental health services   outpatient care  Patient's Choice of Community Agency(s): [facility] or [facility]  Patient/Family Anticipates Transition to: (TBD) other (see comments)     Problem: Suicide Risk  Goal: Absence of Self-Harm  Intervention: Promote Psychosocial Wellbeing  Recent Flowsheet Documentation  Taken 8/28/2023 1351 by Katelyn Wolfe MSW  Supportive Measures:   active listening utilized   verbalization of feelings encouraged     Goal Outcome Evaluation:  Plan of Care Reviewed With: patient  Patient Agreement with Plan of Care: agrees  Consent Given to Review Plan with: Refused  Progress: no change  Outcome  Evaluation: Pt denies feelings of HI\AVH. Reports having SI at this time, no plan.

## 2023-08-28 NOTE — NURSING NOTE
Behavior   Note any precipitants to event or behavior   Describe level and action of any aggressive behavior or speech and associated interventions.     Anxiety: Restless/Edgy  Depression: depressed mood  Pain  0  AVH   no  S/I   no  Plan  no  H/I   no  Plan  no    Affect   euthymic/normal      Note:  Patient doing well so far this shift only complaining of mild anxiety.  She denies any other problems.  No prn medications needed.  Will continue to monitor.       Intervention    PRN medication utilized:  no    Instructed in medication usage and effects  Medications administered as ordered  Encouraged to verbalize needs      Response    Verbalized understanding   Did patient take medications as ordered yes   Did patient interact with assessment?  yes     Plan    Will monitor for safety  Will monitor every 15 minutes as ordered  Will evaluate and promote the plan of care    Last BM:  unknown date  (Please chart in I/O as well)

## 2023-08-28 NOTE — PROGRESS NOTES
Psychiatry Progress Note    8/28/2023    Legal Status: Involuntary; Hold expires 8/30/2023 0800     Chief Complaint: suicidal ideation    Subjective:  Patient is a 53 y.o. female who was hospitalized for suicidal ideation.    Patient is seen individually on the adult unit. Patient states she continues to worry about how things will be when she returns home. Patient states she will be lonely. Patient states she has reviewed the DBT handouts and has made a list of coping skills she can utilize to help with negative thoughts. She states she feels that journaling and talking with others has been helpful to her since her admission.    Patient states she tolerated her first ketamine infusion well. She is agreeable to a second infusion on Monday.     Patient states she continues to have intermittent SI. Patient denies SI/AVH. Patient is compliant with medications. Patient is noted to engage with staff and peers. No behaviors noted.    Patient reports medications are tolerable.    Objective     Vital Signs    Vitals:    08/26/23 1900 08/27/23 0813 08/27/23 1900 08/28/23 0759   BP: 133/84 107/60 134/65 116/57   BP Location: Right arm Right arm Right arm Left arm   Patient Position: Sitting Sitting Sitting Sitting   Pulse: 60 65 60 60   Resp: 18 18 18 18   Temp: 97 °F (36.1 °C) 97.3 °F (36.3 °C) 98.4 °F (36.9 °C) 98.1 °F (36.7 °C)   TempSrc: Temporal Temporal Temporal Temporal   SpO2: 99% 100% 100% 99%   Weight:       Height:           Physical Exam: as of today, 08/28/23   General Appearance: alert, appears stated age, and cooperative,  Hygiene:   fair  Gait & Station: Normal  Musculoskeletal: No tremors or abnormal involuntary movements    Mental Status Exam: as of today, 08/28/23   Cooperation:  Cooperative  Eye Contact:  Good  Psychomotor Behavior:  Appropriate  Mood: Sad/Depressed  Affect:  mood-congruent  Speech:  Normal  Thought Process:  Coherent  Associations: Goal Directed  Thought Content:     Mood  congruent   Suicidal:  Suicidal Ideation, intermittent   Homicidal:  None   Hallucinations:  None   Delusion:  None  Cognitive Functioning:   Consciousness: awake and alert  Reliability:   adequate  Insight:   impaired, improving  Judgement:  Impaired  Impulse Control:  Impaired    Lab Results: Results source: EMR   Lab Results (last 24 hours)       ** No results found for the last 24 hours. **            Radiology Results:  Imaging Results (Last 24 Hours)       ** No results found for the last 24 hours. **            Medicine:   Current Facility-Administered Medications:     acetaminophen (TYLENOL) tablet 650 mg, 650 mg, Oral, Q4H PRN, Curtis Maria II, MD, 650 mg at 08/28/23 0200    aluminum-magnesium hydroxide-simethicone (MAALOX MAX) 400-400-40 MG/5ML suspension 15 mL, 15 mL, Oral, Q6H PRN, Curtis Maria II, MD    amiodarone (PACERONE) tablet 200 mg, 200 mg, Oral, Daily, Curtis Maria II, MD, 200 mg at 08/28/23 0814    clonazePAM (KlonoPIN) tablet 0.5 mg, 0.5 mg, Oral, BID PRN, Curtis Maria II, MD, 0.5 mg at 08/28/23 0200    cloNIDine (CATAPRES) tablet 0.1 mg, 0.1 mg, Oral, Q4H PRN, Curtis Maria II, MD    hydrOXYzine pamoate (VISTARIL) capsule 50 mg, 50 mg, Oral, Q6H PRN, Curtis Maria II, MD, 50 mg at 08/24/23 0013    loperamide (IMODIUM) capsule 2 mg, 2 mg, Oral, Q2H PRN, Curtis Maria II, MD    lurasidone (LATUDA) tablet 40 mg, 40 mg, Oral, Daily With Dinner, Curtis Maria II, MD, 40 mg at 08/27/23 1955    magnesium hydroxide (MILK OF MAGNESIA) suspension 10 mL, 10 mL, Oral, Daily PRN, Curtis Maria II, MD    ondansetron ODT (ZOFRAN-ODT) disintegrating tablet 4 mg, 4 mg, Oral, Q6H PRN, Curtis Maria II, MD    pantoprazole (PROTONIX) EC tablet 40 mg, 40 mg, Oral, Daily, Curtis Maria II, MD, 40 mg at 08/28/23 0814    Pharmacy to dose warfarin, , Does not apply, Continuous PRN, Omaira Willoughby APRN    prazosin (MINIPRESS)  capsule 2 mg, 2 mg, Oral, Nightly, Curtis Maria II, MD, 2 mg at 08/27/23 2000    pregabalin (LYRICA) capsule 25 mg, 25 mg, Oral, BID, Curtis Maria II, MD, 25 mg at 08/28/23 0814    temazepam (RESTORIL) capsule 7.5 mg, 7.5 mg, Oral, Nightly, Curtis Maria II, MD, 7.5 mg at 08/27/23 2001    temazepam (RESTORIL) capsule 7.5 mg, 7.5 mg, Oral, Nightly PRN, Curtis Maria II, MD    venlafaxine XR (EFFEXOR-XR) 24 hr capsule 37.5 mg, 37.5 mg, Oral, Daily With Breakfast, Curtis Maria II, MD, 37.5 mg at 08/28/23 0814    warfarin (COUMADIN) tablet 3 mg, 3 mg, Oral, Daily, Marcela Manriquez MD, 3 mg at 08/27/23 1955    Diagnoses/Assessment:     Suicidal ideation    Bipolar I disorder, most recent episode depressed, severe without psychotic features    Bereavement    Post traumatic stress disorder (PTSD)    Problems of guilt    Nihilism    Chronic atrial fibrillation    GERD (gastroesophageal reflux disease)    Ineffective coping      Treatment Plan:    1) Will continue care for the patient on the behavioral health unit at [facility] to ensure patient safety.  2) Will continue to provide treatment with the unit milieu, activities, therapies and psychopharmacological management.  3) Patient to be placed on or continued on  Q15 minute checks  and Suicide precautions.  4) Pertinent medical issues:   --Atrial fibrillation, chronic:   --Continue home amiodarone and Coumadin, pharmacy to dose. Paced.  --GERD:   --Continue PPI.  --ANANDA resolved, downtrending Cr  5) Will order following labs: none  6) Will make the following medication changes:   Bipolar d/o, PTSD, guilt, grief, SI  --Titrate Latuda to 40mg qDinner   --Cont Prazosin 2 mg qhs  --Begin Effexor XR 37.5mg daily with breakfast  7) Will continue discharge planning for patient: outpatient psychiatric care, outpatient medical care, safety planning and placement as appropriate.    Treatment plan and medication risks and  benefits discussed with: Patient and treatment team    Cherelle Hill, DESIREE  08/28/23 at 08:33 CDT

## 2023-08-28 NOTE — PLAN OF CARE
Goal Outcome Evaluation:     Patient Agreement with Plan of Care: agrees     Progress: improving  Outcome Evaluation: Patient states less depression and overall improvement.  She did have a nightmare and needed a klonopin through the night.  No other complaints. She has slept 6 hours so far this shift.

## 2023-08-29 LAB
INR PPP: 2.77 (ref 0.8–1.2)
PROTHROMBIN TIME: 28.5 SECONDS (ref 11.1–15.3)

## 2023-08-29 PROCEDURE — 93005 ELECTROCARDIOGRAM TRACING: CPT | Performed by: PSYCHIATRY & NEUROLOGY

## 2023-08-29 PROCEDURE — 85610 PROTHROMBIN TIME: CPT | Performed by: NURSE PRACTITIONER

## 2023-08-29 PROCEDURE — 93010 ELECTROCARDIOGRAM REPORT: CPT | Performed by: INTERNAL MEDICINE

## 2023-08-29 PROCEDURE — 99232 SBSQ HOSP IP/OBS MODERATE 35: CPT | Performed by: PSYCHIATRY & NEUROLOGY

## 2023-08-29 RX ADMIN — TEMAZEPAM 7.5 MG: 7.5 CAPSULE ORAL at 20:28

## 2023-08-29 RX ADMIN — AMIODARONE HYDROCHLORIDE 200 MG: 200 TABLET ORAL at 08:15

## 2023-08-29 RX ADMIN — PRAZOSIN HYDROCHLORIDE 2 MG: 1 CAPSULE ORAL at 20:27

## 2023-08-29 RX ADMIN — WARFARIN SODIUM 3 MG: 3 TABLET ORAL at 17:05

## 2023-08-29 RX ADMIN — PANTOPRAZOLE SODIUM 40 MG: 40 TABLET, DELAYED RELEASE ORAL at 08:15

## 2023-08-29 RX ADMIN — HYDROXYZINE PAMOATE 50 MG: 50 CAPSULE ORAL at 16:15

## 2023-08-29 RX ADMIN — PREGABALIN 25 MG: 25 CAPSULE ORAL at 20:28

## 2023-08-29 RX ADMIN — ACETAMINOPHEN 650 MG: 325 TABLET, FILM COATED ORAL at 00:19

## 2023-08-29 RX ADMIN — LURASIDONE HYDROCHLORIDE 40 MG: 40 TABLET, FILM COATED ORAL at 17:05

## 2023-08-29 RX ADMIN — VENLAFAXINE HYDROCHLORIDE 75 MG: 75 CAPSULE, EXTENDED RELEASE ORAL at 08:15

## 2023-08-29 RX ADMIN — PREGABALIN 25 MG: 25 CAPSULE ORAL at 08:15

## 2023-08-29 NOTE — PLAN OF CARE
"Goal Outcome Evaluation:  Plan of Care Reviewed With: patient  Patient Agreement with Plan of Care: agrees     Progress: no change  Outcome Evaluation: Pt continues to be medication compliant and denies SI/HI/AVH. She reported feeling \"foggy\" this morning. Pt has been eating well but complains of trouble sleeping at night. She remains free of falls this shift.         "

## 2023-08-29 NOTE — NURSING NOTE
Pt complaining of chest pain. VSS. No other symptoms. PRN vistaril given for anxiety. Order for STAT EKG.

## 2023-08-29 NOTE — NURSING NOTE
"Behavior   Note any precipitants to event or behavior   Describe level and action of any aggressive behavior or speech and associated interventions.     Anxiety: Excess Worry  Depression: depressed mood  Pain  0  AVH   no  S/I   no  Plan  no  H/I   no  Plan  no    Affect   flat      Note: Pt seen in her room this AM. She denies SI/HI/AVH but states that she is feeling a little anxiety/depression today. Pt complains of her brain \"feeling foggy today\". Pt said she did not sleep well last night and is trying to rest today.       Intervention    PRN medication utilized:  no    Instructed in medication usage and effects  Medications administered as ordered  Encouraged to verbalize needs      Response    Verbalized understanding   Did patient take medications as ordered yes   Did patient interact with assessment?  yes     Plan    Will monitor for safety  Will monitor every 15 minutes as ordered  Will evaluate and promote the plan of care    Last BM:  unknown date  (Please chart in I/O as well)   "

## 2023-08-29 NOTE — NURSING NOTE
Behavior   Note any precipitants to event or behavior   Describe level and action of any aggressive behavior or speech and associated interventions.     Anxiety: Patient denies at this time  Depression: Patient denies at this time  Pain  0  AVH   no  S/I   no  Plan  no  H/I   no  Plan  no    Affect   euthymic/normal      Note: Pt calm and cooperative. Denies SI/HI/AVH. Compliant with scheduled medications. Refused to participate in group. Interacted with peers. No other concerns voiced at this time.       Intervention    PRN medication utilized:  no    Instructed in medication usage and effects  Medications administered as ordered  Encouraged to verbalize needs      Response    Verbalized understanding   Did patient take medications as ordered yes   Did patient interact with assessment?  yes     Plan    Will monitor for safety  Will monitor every 15 minutes as ordered  Will evaluate and promote the plan of care    Last BM:  unknown date  (Please chart in I/O as well)

## 2023-08-29 NOTE — MEDICAL STUDENT
"Psychiatry Progress Note    8/29/2023    Legal Status: Involuntary Hold    Chief Complaint: suicidal ideation and depression    Subjective:  Patient is a 53 y.o. female who was hospitalized for suicidal ideation and depression.    Pt was seen in her room on the adult unit. Pt reports feeling \"very very sad\", having brain fog, and \"feeling like she is in space\". Pt says she has decreased energy and appetite. Pt states she does not feel like talking to anyone. Pt denies muscle spasms. Pt states she has SI today and has through her time in the hospital.      Objective     Vital Signs    Vitals:    08/28/23 1300 08/28/23 1310 08/28/23 1900 08/29/23 0800   BP: 121/72 117/67 127/70 116/58   BP Location: Left arm Left arm Left arm Left arm   Patient Position: Lying Lying Sitting Sitting   Pulse: 59 60 60 75   Resp: 16 18 16 20   Temp:   98.2 °F (36.8 °C) 97.3 °F (36.3 °C)   TempSrc:   Temporal Tympanic   SpO2: 99% 99% 100% 99%   Weight:       Height:           Physical Exam: as of today, 08/29/23   General Appearance: alert and fatigued,  Hygiene:   fair  Gait & Station: Normal  Musculoskeletal: No tremors or abnormal involuntary movements       Lab Results: Results source: EMR   Lab Results (last 24 hours)       Procedure Component Value Units Date/Time    Protime-INR [[id number]]  (Abnormal) Collected: 08/29/23 0755    Specimen: Blood Updated: 08/29/23 0849     Protime 28.5 Seconds      INR 2.77    Narrative:      Therapeutic range for most indications is 2.0-3.0 INR,  or 2.5-3.5 for mechanical heart valves.            Radiology Results:  Imaging Results (Last 24 Hours)       Procedure Component Value Units Date/Time    US Guided Vascular Access [[id number]] Collected: 08/28/23 1009     Updated: 08/28/23 1605    Narrative:      Targeted grayscale ultrasound with compression of the right basilic vein  demonstrates patency.            Medicine:   Current Facility-Administered Medications:     acetaminophen (TYLENOL) tablet " 650 mg, 650 mg, Oral, Q4H PRN, Curtis Maria II, MD, 650 mg at 08/29/23 0019    aluminum-magnesium hydroxide-simethicone (MAALOX MAX) 400-400-40 MG/5ML suspension 15 mL, 15 mL, Oral, Q6H PRN, Curtis Maria II, MD    amiodarone (PACERONE) tablet 200 mg, 200 mg, Oral, Daily, Curtis Maria II, MD, 200 mg at 08/29/23 0815    clonazePAM (KlonoPIN) tablet 0.5 mg, 0.5 mg, Oral, BID PRN, Curtis Maria II, MD    cloNIDine (CATAPRES) tablet 0.1 mg, 0.1 mg, Oral, Q4H PRN, Curtis Maria II, MD    hydrOXYzine pamoate (VISTARIL) capsule 50 mg, 50 mg, Oral, Q6H PRN, Curtis Maria II, MD, 50 mg at 08/24/23 0013    loperamide (IMODIUM) capsule 2 mg, 2 mg, Oral, Q2H PRN, Curtis Maria II, MD    lurasidone (LATUDA) tablet 40 mg, 40 mg, Oral, Daily With Dinner, Curtis Maria II, MD, 40 mg at 08/28/23 1719    magnesium hydroxide (MILK OF MAGNESIA) suspension 10 mL, 10 mL, Oral, Daily PRN, Curtis Maria II, MD    ondansetron ODT (ZOFRAN-ODT) disintegrating tablet 4 mg, 4 mg, Oral, Q6H PRN, Curtis Maria II, MD    pantoprazole (PROTONIX) EC tablet 40 mg, 40 mg, Oral, Daily, Curtis Maria II, MD, 40 mg at 08/29/23 0815    Pharmacy to dose warfarin, , Does not apply, Continuous PRN, Omaira Willoughby G, APRN    prazosin (MINIPRESS) capsule 2 mg, 2 mg, Oral, Nightly, Curtis Maria II, MD, 2 mg at 08/28/23 2059    pregabalin (LYRICA) capsule 25 mg, 25 mg, Oral, BID, Curtis Maria II, MD, 25 mg at 08/29/23 0815    temazepam (RESTORIL) capsule 7.5 mg, 7.5 mg, Oral, Nightly, Curtis Maria II, MD, 7.5 mg at 08/28/23 2059    temazepam (RESTORIL) capsule 7.5 mg, 7.5 mg, Oral, Nightly PRN, Cutris Maria II, MD    venlafaxine XR (EFFEXOR-XR) 24 hr capsule 75 mg, 75 mg, Oral, Daily With Breakfast, Curtis Maria II, MD, 75 mg at 08/29/23 0815    warfarin (COUMADIN) tablet 3 mg, 3 mg, Oral, Daily, Marcela Manriquez MD, 3 mg at  08/28/23 1719    Diagnoses/Assessment:     Suicidal ideation    Bipolar I disorder, most recent episode depressed, severe without psychotic features    Bereavement    Post traumatic stress disorder (PTSD)    Problems of guilt    Nihilism    Chronic atrial fibrillation    GERD (gastroesophageal reflux disease)    Ineffective coping      Treatment Plan:    1) Will continue care for the patient on the behavioral health unit at [facility] to ensure patient safety.  2) Will continue to provide treatment with the unit milieu, activities, therapies and psychopharmacological management.       Treatment plan and medication risks and benefits discussed with: Patient and treatment team.    Marie Linn, Medical Student  08/29/23 at 12:21 CDT          See NP/Attending note from this date for full information.     Curtis Maria II, MD  09/02/23 @ 2:10 PM CDT

## 2023-08-29 NOTE — CONSULTS
Adult Nutrition  Assessment/PES    Patient Name:  Wing Hickman  YOB: 1970  MRN: [id number]  Admit Date:  8/23/2023    Assessment Date:  8/29/2023    Comments:  RD staff seeing for length of stay.  Pt has average intake is 52% for 16 meals.  Pt has been NPO a few times d/t ketamine infusion.  Pt has been eating snacks.  Will continue with current nutrition POC and monitor intake during admit.      Active Hospital Problems:  Active Hospital Problems    Diagnosis  POA    **Suicidal ideation [R45.851]  Not Applicable    Problems of guilt [R45.89]  Unknown    Nihilism [F22]  Unknown    Chronic atrial fibrillation [I48.20]  Unknown    GERD (gastroesophageal reflux disease) [K21.9]  Unknown    Ineffective coping [R45.89]  Unknown    Bipolar I disorder, most recent episode depressed, severe without psychotic features [F31.4]  Yes    Post traumatic stress disorder (PTSD) [F43.10]  Yes    Bereavement [Z63.4]  Not Applicable      Resolved Hospital Problems   No resolved problems to display.     Past Medical History:   Diagnosis Date    Anemia     Arthritis     Asthma     CHF (congestive heart failure)     Depression     Diabetes mellitus     Disease of thyroid gland     History of transfusion     Hypertension     Injury of back     Kidney stone     MDRO (multiple drug resistant organisms) resistance     Migraine     Seizures         Reason for Assessment       Row Name 08/29/23 1420          Reason for Assessment    Reason For Assessment per organizational policy  LOS                    Nutrition/Diet History       Row Name 08/29/23 1420          Nutrition/Diet History    Typical Intake (Food/Fluid/EN/PN) pt has been eating okay.  NPO for a few meals d/t ketamine infusion.  Per notes, pt also has hx of gastric bypass                    Labs/Tests/Procedures/Meds       Row Name 08/29/23 1421          Labs/Procedures/Meds    Lab Results Reviewed reviewed     Lab Results Comments Cl 108, Alb 3.3         Diagnostic Tests/Procedures    Diagnostic Test/Procedure Reviewed reviewed        Medications    Pertinent Medications Reviewed reviewed     Pertinent Medications Comments protonix                      Estimated/Assessed Needs - Anthropometrics       Row Name 08/29/23 1421          Anthropometrics    Weight for Calculation 80.7 kg (178 lb)        Estimated/Assessed Needs    Additional Documentation KCAL/KG (Group);Protein Requirements (Group);Fluid Requirements (Group)        KCAL/KG    KCAL/KG 25 Kcal/Kg (kcal);20 Kcal/Kg (kcal)     20 Kcal/Kg (kcal) 1614.8     25 Kcal/Kg (kcal) 2018.5        Protein Requirements    Weight Used For Protein Calculations 80.7 kg (178 lb)     Est Protein Requirement Amount (gms/kg) 0.8 gm protein     Estimated Protein Requirements (gms/day) 64.59        Fluid Requirements    Fluid Requirements (mL/day) 1800     RDA Method (mL) 1800                    Nutrition Prescription Ordered       Row Name 08/29/23 1423          Nutrition Prescription PO    Current PO Diet Regular                    Evaluation of Received Nutrient/Fluid Intake       Row Name 08/29/23 1423          PO Evaluation    Number of Meals 16     % PO Intake 52                       Problem/Interventions:   Problem 1       Row Name 08/29/23 1426          Nutrition Diagnoses Problem 1    Problem 1 Predicted Suboptimal Intake     Etiology (related to) Medical Diagnosis;Other (comment)  pt has been getting ketamine infusion that requires pt to be NPO     Psychosocial Other (comment)  admit with SI     Signs/Symptoms (evidenced by) PO Intake     Percent (%) intake recorded 52 %     Over number of meals 16                          Intervention Goal       Row Name 08/29/23 1432          Intervention Goal    General Maintain nutrition;Meet nutritional needs for age/condition     PO Meet estimated needs     Weight No significant weight loss                    Nutrition Intervention       Row Name 08/29/23 1432          Nutrition  Intervention    RD/Tech Action Follow Tx progress                      Education/Evaluation       Row Name 08/29/23 1432          Education    Education Will Instruct as appropriate        Monitor/Evaluation    Monitor Per protocol;PO intake                     Electronically signed by:  Zofia Ny RD  08/29/23 14:32 CDT

## 2023-08-29 NOTE — PLAN OF CARE
Goal Outcome Evaluation:         RD staff seeing for length of stay.  Pt has average intake is 52% for 16 meals.  Pt has been NPO a few times d/t ketamine infusion.  Pt has been eating snacks.  Will continue with current nutrition POC and monitor intake during admit.

## 2023-08-29 NOTE — PLAN OF CARE
Goal Outcome Evaluation:  Plan of Care Reviewed With: patient  Patient Agreement with Plan of Care: agrees  Consent Given to Review Plan with: Refused  Progress: no change  Outcome Evaluation: Pt continues with Ketamine treatments. Pt has slept 6 hrs and 55 minutes so far this shift.

## 2023-08-29 NOTE — PROGRESS NOTES
Psychiatry Note  8/26/23    CC: Nishilism, Depression      S --  Ketamine infusion perfomed to good effect.  Toelrated well.  No AE.     Somnolent after.  Rested well.  Some increase in appetite.        O -   VSS    Affect dysphoric.  TP coherent. I&J impaired.  Speech soft.        A&P --    -Cont q15 min checks and suicide precautions     --Atrial fibrillation, chronic:   --Continue home amiodarone and Coumadin, pharmacy to dose. Paced.  --GERD:   --Continue PPI.  --ANANDA resolved      Bipolar d/o, PTSD, guilt, grief, SI  --Titrate Latuda to 40mg qDinner   --Cont Prazosin 2 mg qhs      >50 min spent (65 m) on care today including direct care, coordination of care, Ketamine infusion related time, documentation, chart review.      Curtis Maria II, MD  08/28/23 @ 11:21 PM CDT

## 2023-08-29 NOTE — PROGRESS NOTES
"Met with pt this date. Pt unable to make eye contact. Pt slow to respond to questioning. Pt reports feeling \"fog.\" Unable to assess for orientation, SI\HI\AVH at this time.     NATHANIEL Wilkins notified.   "

## 2023-08-29 NOTE — PROGRESS NOTES
"Anticoagulation by Pharmacy - Warfarin    Wing Hickman is a 53 y.o.female being continued on warfarin for atrial fibrillation  Home regimen: Warfarin 6 mg MWFSun and 3 mg TThSat (Average daily warfarin each week: 5 mg/day)  INR Goal: 2-3  Last INR:   Lab Results   Component Value Date    INR 2.77 (H) 08/29/2023       Objective:  [Ht: 157.5 cm (62.01\"); Wt: 80.7 kg (178 lb)]  Lab Results   Component Value Date    INR 2.77 (H) 08/29/2023    INR 2.46 (H) 08/27/2023    INR 2.18 (H) 08/26/2023    PROTIME 28.5 (H) 08/29/2023    PROTIME 26.1 (H) 08/27/2023    PROTIME 23.8 (H) 08/26/2023     Lab Results   Component Value Date    HGB 13.7 08/26/2023    HGB 12.6 08/23/2023    HGB 13.0 08/22/2023    HCT 41.6 08/26/2023    HCT 39.1 08/23/2023    HCT 40.1 08/22/2023     08/26/2023     08/23/2023     (L) 08/22/2023       Recent Warfarin Administrations       The 5 most recent administrations since 08/22/2023 are shown below each listed medication.    Warfarin Sodium         Order Dose Date Given     warfarin (COUMADIN) tablet 3 mg 3 mg 08/28/2023      3 mg 08/27/2023      3 mg 08/26/2023     warfarin (COUMADIN) tablet 3 mg 3 mg 08/25/2023                    Assessment  Interacting medications: Amiodarone and venlafaxine can increase the affect of warfarin on the INR  INR is therapeutic  No new H&H to assess trends  Continue 3 mg    Plan:  1.  Give warfarin 3 mg tablet PO @ 1800 tonight  2.  Draw a PT/INR in AM on 8/31  3.  Pharmacy will continue to follow    Andrea Rodriguez, PharmD  08/29/23 13:40 CDT     "

## 2023-08-30 PROCEDURE — 99232 SBSQ HOSP IP/OBS MODERATE 35: CPT | Performed by: NURSE PRACTITIONER

## 2023-08-30 RX ADMIN — PANTOPRAZOLE SODIUM 40 MG: 40 TABLET, DELAYED RELEASE ORAL at 08:08

## 2023-08-30 RX ADMIN — LURASIDONE HYDROCHLORIDE 60 MG: 40 TABLET, FILM COATED ORAL at 17:05

## 2023-08-30 RX ADMIN — AMIODARONE HYDROCHLORIDE 200 MG: 200 TABLET ORAL at 08:08

## 2023-08-30 RX ADMIN — WARFARIN SODIUM 3 MG: 3 TABLET ORAL at 17:05

## 2023-08-30 RX ADMIN — PREGABALIN 25 MG: 25 CAPSULE ORAL at 08:08

## 2023-08-30 RX ADMIN — TEMAZEPAM 7.5 MG: 7.5 CAPSULE ORAL at 20:35

## 2023-08-30 RX ADMIN — VENLAFAXINE HYDROCHLORIDE 112.5 MG: 37.5 CAPSULE, EXTENDED RELEASE ORAL at 08:08

## 2023-08-30 RX ADMIN — PREGABALIN 25 MG: 25 CAPSULE ORAL at 20:35

## 2023-08-30 RX ADMIN — PRAZOSIN HYDROCHLORIDE 2 MG: 1 CAPSULE ORAL at 20:35

## 2023-08-30 NOTE — PLAN OF CARE
Goal Outcome Evaluation:  Plan of Care Reviewed With: patient  Patient Agreement with Plan of Care: agrees     Progress: no change  Outcome Evaluation: Pt has been alert et cooperative with no complaints or concerns voiced. She has attended groups this shift. Appetite good.

## 2023-08-30 NOTE — PROGRESS NOTES
"Anticoagulation by Pharmacy - Warfarin    Wing Hickman is a 53 y.o.female who has been consulted for warfarin for atrial fibrillation    Home regimen: Warfarin 6mg Mon, Wed, Fri, Sun and Warfarin 3mg Tues, Thurs, Sat  INR Goal: 2-3    Objective:  [Ht: 157.5 cm (62.01\"); Wt: 80.7 kg (178 lb)]    Lab Results   Component Value Date    INR 2.77 (H) 08/29/2023    INR 2.46 (H) 08/27/2023    INR 2.18 (H) 08/26/2023    PROTIME 28.5 (H) 08/29/2023    PROTIME 26.1 (H) 08/27/2023    PROTIME 23.8 (H) 08/26/2023     Lab Results   Component Value Date    HGB 13.7 08/26/2023    HGB 12.6 08/23/2023    HGB 13.0 08/22/2023    HCT 41.6 08/26/2023    HCT 39.1 08/23/2023    HCT 40.1 08/22/2023     08/26/2023     08/23/2023     (L) 08/22/2023       Recent Warfarin Administrations       The 5 most recent administrations since 08/23/2023 are shown below each listed medication.    Warfarin Sodium         Order Dose Date Given     warfarin (COUMADIN) tablet 3 mg 3 mg 08/29/2023      3 mg 08/28/2023      3 mg 08/27/2023      3 mg 08/26/2023     warfarin (COUMADIN) tablet 3 mg 3 mg 08/25/2023                  Assessment  No new H/H/platelets today but no reports of patient bleeding  Interacting medications: amiodarone and venlafaxine can increase the anticoagulant effect with warfarin  Most recent INR 8/29 was in range at 2.77    Plan:  Give warfarin 3 mg mg PO @ 1800 tonight *ordered*  PT/INR to be drawn tomorrow 8/31 *ordered*  Pharmacy will continue to follow    Thank you for this consult.     Goldie Gonzalez, PharmD  PGY1 Pharmacy Resident @[phone]  08/30/2023 at 1014  "

## 2023-08-30 NOTE — NURSING NOTE
Behavior   Note any precipitants to event or behavior   Describe level and action of any aggressive behavior or speech and associated interventions.     Anxiety: Excess Worry  Depression: depressed mood, feelings of worthlessness/guilt, and hopelessness  Pain  0  AVH   no  S/I   no  Plan  no  H/I   no  Plan  no    Affect   euthymic/normal      Note: Pt noted to be sitting at table alone with blanket. Pt explained that she has her husbands blanket for security et feels his presence. She c/o having nightmares et says that prazosin helps at times. Pt denies having HI, SI or hallucinations et says that she feels like her life is slowly getting better.       Intervention    PRN medication utilized:  no    Instructed in medication usage and effects  Medications administered as ordered  Encouraged to verbalize needs      Response    Verbalized understanding   Did patient take medications as ordered yes   Did patient interact with assessment?  yes     Plan    Will monitor for safety  Will monitor every 15 minutes as ordered  Will evaluate and promote the plan of care    Last BM:  unknown date  (Please chart in I/O as well)

## 2023-08-30 NOTE — PROGRESS NOTES
Psychiatry Progress Note   8/29/2023      HD: #6  Legal:  Hold --> Vol    Chief Complaint: Bipolar Depression      Subjective --  Ms. Wing Hickman is a 53 y.o. female who was seen on the Adult unit.       Today, on 08/29/23:    Seen today in adult conference room.  Ongoing depression, severe, impairing, constant.  Slowly improving.  Significant next day sedation from Ketamine.  Some feelings of defeatism from this.  We reviewed cognitive distorations and automatic thoughts.          Review of Systems:  --Neuro: Denies headache  --GI: Denies stomachache        Objective   Objective --    Vital Signs:  Temp:  [97 °F (36.1 °C)-98 °F (36.7 °C)] 97 °F (36.1 °C)  Heart Rate:  [60-88] 60  Resp:  [18-20] 18  BP: (116-136)/(58-67) 119/63    Patient Vitals for the past 24 hrs:   BP Temp Temp src Pulse Resp SpO2   08/29/23 1900 119/63 97 °F (36.1 °C) Tympanic 60 18 99 %   08/29/23 1837 136/67 98 °F (36.7 °C) Tympanic 88 18 99 %   08/29/23 0800 116/58 97.3 °F (36.3 °C) Tympanic 75 20 99 %             Today, on 08/29/23:    Physical Exam:   -General Appearance:  alert and in NAD  -Hygiene:  Adequate   -Gait & Station:  Normal  -Musculoskeletal:  No tremors or abnormal involuntary movements and No atrophy noted  -Pulm: unlaboured     Mental Status Exam:   --Cooperation:  Cooperative  --Eye Contact:  Fair  --Psychomotor Behavior:  Slow and Improving  --Mood:  Worried  --Affect:  heavily constricted, mood-congruent to mood and thought processing, and improving though still a good degree of dysphoria  --Speech:  Normal r/r/v  --Thought Process:  Coherent and Sluggish  --Associations: Goal Directed and Circumstantial  --Themes:  Helplessness and Hopelessness  --Thought Content:     --Bizarre and Mood congruent   --Suicidal:  Nihilistic    --Homicidal:  Denies   --Hallucinations:  Denies   --Delusion:  None noted/overt  --Cognitive Functioning:  --Consciousness: awake and alert  Orientation:  Person, Place, Time, and  Situation  --Reliability:  adequate  --Insight:  Diminished and Improving  --Judgment:  Diminished and Improving  --Impulse Control:  Diminished and Improving      Labs & Imaging  Lab Results (last 24 hours)       Procedure Component Value Units Date/Time    Protime-INR [[id number]]  (Abnormal) Collected: 08/29/23 0755    Specimen: Blood Updated: 08/29/23 0849     Protime 28.5 Seconds      INR 2.77    Narrative:      Therapeutic range for most indications is 2.0-3.0 INR,  or 2.5-3.5 for mechanical heart valves.          Results source: EMR    Imaging Results (Last 24 Hours)       ** No results found for the last 24 hours. **          Results source: EMR      Hospital Medications:   Scheduled Meds:amiodarone, 200 mg, Oral, Daily  lurasidone, 40 mg, Oral, Daily With Dinner  pantoprazole, 40 mg, Oral, Daily  prazosin, 2 mg, Oral, Nightly  pregabalin, 25 mg, Oral, BID  temazepam, 7.5 mg, Oral, Nightly  venlafaxine XR, 75 mg, Oral, Daily With Breakfast  warfarin, 3 mg, Oral, Daily      Continuous Infusions:Pharmacy to dose warfarin,       PRN Meds:.  acetaminophen    aluminum-magnesium hydroxide-simethicone    clonazePAM    cloNIDine    hydrOXYzine pamoate    loperamide    magnesium hydroxide    ondansetron ODT    Pharmacy to dose warfarin    temazepam      Assessment:     Suicidal ideation    Bipolar I disorder, most recent episode depressed, severe without psychotic features    Bereavement    Post traumatic stress disorder (PTSD)    Problems of guilt    Nihilism    Chronic atrial fibrillation    GERD (gastroesophageal reflux disease)    Ineffective coping              Treatment Plan:  1) Will continue care for the patient on the behavioral health unit at [facility].      2) Will continue to provide treatment with the unit milieu, activities, therapies and psychopharmacological management.    3) Patient to be placed on or continued on  Q15 minute checks  and Suicide precautions.    4) Treatment  Planning:  -Atrial fibrillation, chronic:   --Continue home amiodarone and Coumadin, pharmacy to dose. Paced.  --GERD:   --Continue PPI.  --ANANDA resolved  --Chronic pain/neuorapthy: cont Lyrica 25mg BID    Bipolar d/o, PTSD, guilt, grief, SI  --Titrate Latuda to 60mg qDinner   --Cont Prazosin 2 mg qhs  --Cont Restoril 7.5mg qHS  --Titrate Effexor XR to 112.5mg daily with breakfast   --Hold Ketamine infusion tomorrow, consider lower dose infusion on Thursday given AE   --First infusion, 7/26/23 @ 0.4mg/kg over 45 min; Pre MADRS 51  --Second, 7/28/23 @ 0.45 mg/kg over 45 min; Pre BRANDI 38    --> Disposition Planning: TBD    Treatment planning, along with medication benefits/risks/AE, alternatives discussed with: Patient and Treatment Team.  Curtis Maria II, MD  08/29/23 @ 22:35 CDT

## 2023-08-30 NOTE — PROGRESS NOTES
8/30/2023    Chief Complaint: depression    Subjective:  Patient is a 53 y.o. female that is currently inpt on the adult U today she is seen in the [facility] area. Pt is alert/oriented, she is reporting she feels better today.  She states that she slept ok and that she doesn't recall some short term memory items such as what she ate.    Pt is up and engaged with others, she reports she wants to try Ketamine again tomorrow.   Pt denies SI/HI/AVH   Objective     Vital Signs    Temp:  [97 °F (36.1 °C)-98 °F (36.7 °C)] 97.2 °F (36.2 °C)  Heart Rate:  [60-88] 60  Resp:  [18-20] 20  BP: (113-136)/(58-67) 113/58    Physical Exam:   General Appearance: alert, appears stated age, and cooperative,  Hygiene:   fair  Gait & Station: Normal  Musculoskeletal: No tremors or abnormal involuntary movements    Mental Status Exam:   Cooperation:  Cooperative  Eye Contact:  Fair  Psychomotor Behavior:  Appropriate  Mood: Improving  Affect:  mood-congruent  Speech:  Normal  Thought Process:  Coherent  Associations: Circumstantial  Thought Content:     Mood congruent   Suicidal:  None   Homicidal:  None   Hallucinations:  None   Delusion:  None  Cognitive Functioning:   Consciousness: awake and alert  Reliability:  fair  Insight:  Fair  Judgement:  Fair  Impulse Control:  Fair    Lab Results (last 24 hours)       ** No results found for the last 24 hours. **          Imaging Results (Last 24 Hours)       ** No results found for the last 24 hours. **            Medicine:   Current Facility-Administered Medications:     acetaminophen (TYLENOL) tablet 650 mg, 650 mg, Oral, Q4H PRN, Curtis Maria II, MD, 650 mg at 08/29/23 0019    aluminum-magnesium hydroxide-simethicone (MAALOX MAX) 400-400-40 MG/5ML suspension 15 mL, 15 mL, Oral, Q6H PRN, Curtis Maria II, MD    amiodarone (PACERONE) tablet 200 mg, 200 mg, Oral, Daily, Curtis Maria II, MD, 200 mg at 08/30/23 0808    clonazePAM (KlonoPIN) tablet 0.5 mg, 0.5 mg, Oral,  BID PRN, Curtis Maria II, MD    cloNIDine (CATAPRES) tablet 0.1 mg, 0.1 mg, Oral, Q4H PRN, Curtis Maria II, MD    hydrOXYzine pamoate (VISTARIL) capsule 50 mg, 50 mg, Oral, Q6H PRN, Curtis Maria II, MD, 50 mg at 08/29/23 1615    loperamide (IMODIUM) capsule 2 mg, 2 mg, Oral, Q2H PRN, Curtis Maria II, MD    Lurasidone HCl (LATUDA) tablet 60 mg, 60 mg, Oral, Daily With Dinner, Curtis Maria II, MD    magnesium hydroxide (MILK OF MAGNESIA) suspension 10 mL, 10 mL, Oral, Daily PRN, Curtis Maria II, MD    ondansetron ODT (ZOFRAN-ODT) disintegrating tablet 4 mg, 4 mg, Oral, Q6H PRN, Curtis Maria II, MD    pantoprazole (PROTONIX) EC tablet 40 mg, 40 mg, Oral, Daily, Curtis Maria II, MD, 40 mg at 08/30/23 0808    Pharmacy to dose warfarin, , Does not apply, Continuous PRN, Omaira Willoughby APRN    prazosin (MINIPRESS) capsule 2 mg, 2 mg, Oral, Nightly, Curtis Maria II, MD, 2 mg at 08/29/23 2027    pregabalin (LYRICA) capsule 25 mg, 25 mg, Oral, BID, Curtis Maria II, MD, 25 mg at 08/30/23 0808    temazepam (RESTORIL) capsule 7.5 mg, 7.5 mg, Oral, Nightly, Curtis Maria II, MD, 7.5 mg at 08/29/23 2028    temazepam (RESTORIL) capsule 7.5 mg, 7.5 mg, Oral, Nightly PRN, Curtis Maria II, MD    venlafaxine XR (EFFEXOR-XR) 24 hr capsule 112.5 mg, 112.5 mg, Oral, Daily With Breakfast, Curtis Maria II, MD, 112.5 mg at 08/30/23 0808    warfarin (COUMADIN) tablet 3 mg, 3 mg, Oral, Daily, Marcela Manriquez MD, 3 mg at 08/29/23 9426    Diagnoses/Assessment:     Suicidal ideation    Bipolar I disorder, most recent episode depressed, severe without psychotic features    Bereavement    Post traumatic stress disorder (PTSD)    Problems of guilt    Nihilism    Chronic atrial fibrillation    GERD (gastroesophageal reflux disease)    Ineffective coping      Treatment Plan:    1) Will continue care for the patient on the  behavioral health unit at [facility] to ensure patient safety.  2) Will continue to provide treatment with the unit milieu, activities, therapies and psychopharmacological management.  3) Patient to be placed on or continued on  Q15 minute checks  and Suicide precautions.  4) Pertinent medical issues:   -Atrial fibrillation, chronic:   --Continue home amiodarone and Coumadin, pharmacy to dose. Paced.  --GERD:   --Continue PPI.  --ANANDA resolved  --Chronic pain/neuorapthy: cont Lyrica 25mg BID  5) Will order following labs: none    6) Will make the following medication changes:   Bipolar d/o, PTSD, guilt, grief, SI  --Cont Latuda to 60mg qDinner   --Cont Prazosin 2 mg qhs  --Cont Restoril 7.5mg qHS  --Cont Effexor XR to 112.5mg daily with breakfast   --Hold Ketamine infusion tomorrow, consider lower dose infusion on Thursday given AE              --First infusion, 7/26/23 @ 0.4mg/kg over 45 min; Pre MADRS 51  --Second, 7/28/23 @ 0.45 mg/kg over 45 min; Pre BRANDI 38  7) Will continue discharge planning as appropriate for patient.  8) Psychotherapy provided for less than 16 minutes.    Treatment plan and medication risks and benefits discussed with: Patient    DESIREE Voss  08/30/23  16:13 CDT

## 2023-08-30 NOTE — PLAN OF CARE
Problem: Adult Behavioral Health Plan of Care  Goal: Plan of Care Review  Outcome: Ongoing, Progressing  Flowsheets  Taken 8/30/2023 1249 by Katelyn Wolfe MSW  Progress: no change  Plan of Care Reviewed With: patient  Patient Agreement with Plan of Care: agrees  Outcome Evaluation: Pt interacting well with peers, attended group therapy and able to follow directions. pt denies any SI\HI\AVH at this time.  Taken 8/30/2023 0444 by Ramona Burgess LPN  Consent Given to Review Plan with: Refused  Goal: Patient-Specific Goal (Individualization)  Outcome: Ongoing, Progressing  Flowsheets  Taken 8/30/2023 1249  Anxieties, Fears or Concerns: denies  Taken 8/25/2023 1605  Patient Personal Strengths:   ability to maintain sobriety   resilient   stable living environment  Patient-Specific Goals (Include Timeframe): pt to have no SI\HI\AVH at time of d\c and to verbalize 1-3 learned coping skills  Individualized Care Needs: group, education, safety planning, aftecare, family session  Patient Vulnerabilities:   adverse childhood experience(s)   family/relationship conflict   lacks insight into illness   poor physical health   poor impulse control   recent loss   limited social skills   limited support system   occupational insecurity   housing insecurity   history of unsuccessful treatment  Goal: Optimized Coping Skills in Response to Life Stressors  Outcome: Ongoing, Progressing  Flowsheets (Taken 8/30/2023 1249)  Optimized Coping Skills in Response to Life Stressors: making progress toward outcome  Intervention: Promote Effective Coping Strategies  Flowsheets (Taken 8/30/2023 1249)  Supportive Measures:   active listening utilized   verbalization of feelings encouraged  Goal: Develops/Participates in Therapeutic [place] to Support Successful Transition  Outcome: Ongoing, Progressing  Flowsheets (Taken 8/30/2023 1249)  Develops/Participates in Therapeutic [place] to Support Successful Transition: making progress toward  outcome  Intervention: Foster Therapeutic [place]  Flowsheets (Taken 8/30/2023 1249)  Trust Relationship/Rapport:   care explained   choices provided   reassurance provided   thoughts/feelings acknowledged   emotional support provided   empathic listening provided   questions answered   questions encouraged  Intervention: Mutually Develop Transition Plan  Flowsheets  Taken 8/30/2023 1249  Transition Support: follow-up care discussed  Patient/Family Anticipates Transition to: home  Offered/Gave Vendor List: no  Taken 8/28/2023 1351  Anticipated Discharge Disposition: home or self-care  Readmission Within the Last 30 Days: previous discharge plan unsuccessful  Taken 8/25/2023 1605  Outpatient/Agency/Support Group Needs:   case management   intensive outpatient services   outpatient medication management   outpatient counseling  Discharge Coordination/Progress: [facility] or [facility]  Transportation Anticipated: public transportation  Transportation Concerns: no car  Current Discharge Risk:   psychiatric illness   lack of support system/caregiver  Concerns to be Addressed:   patient refuses services   adjustment to diagnosis/illness   basic needs   decision-making   coping/stress   compliance issue   cognitive/perceptual   care coordination/care conferences   discharge planning   grief and loss   relationship   mental health   medication   suicidal  Patient/Family Anticipated Services at Transition:      mental health services   outpatient care  Patient's Choice of Community Agency(s): [facility] or [facility]     Problem: Suicide Risk  Goal: Absence of Self-Harm  Intervention: Promote Psychosocial Wellbeing  Recent Flowsheet Documentation  Taken 8/30/2023 1249 by Katelyn Wolfe MSW  Supportive Measures:   active listening utilized   verbalization of feelings encouraged     Goal Outcome Evaluation:  Plan of Care Reviewed With: patient  Patient Agreement with  Plan of Care: agrees  Consent Given to Review Plan with: Refused  Progress: no change  Outcome Evaluation: Pt interacting well with peers, attended group therapy and able to follow directions. pt denies any SI\HI\AVH at this time.

## 2023-08-30 NOTE — PROGRESS NOTES
"Met with pt this morning. Pt states she feels better than yesterday. Pt states, \"it's so strange, I can't even remember what I just ate for breakfast.\" Pt appears to have improved some since yesterday. Pt denies any feelings of SI\HI\AVH at this time. Pt encouraged to attend group therapy this date.   "

## 2023-08-30 NOTE — NURSING NOTE
Orders received to change midline dressing this day. Line was placed on 8/28. Pt declined dressing change this day.

## 2023-08-30 NOTE — PLAN OF CARE
Goal Outcome Evaluation:  Plan of Care Reviewed With: patient  Patient Agreement with Plan of Care: agrees  Consent Given to Review Plan with: Refused  Progress: no change  Outcome Evaluation: Pt states she felt foggy this morning and was feeling better this afternoon. Pt has 6 hrs so far this shift.

## 2023-08-31 LAB
INR PPP: 3.05 (ref 0.8–1.2)
PROTHROMBIN TIME: 30.7 SECONDS (ref 11.1–15.3)

## 2023-08-31 PROCEDURE — 93005 ELECTROCARDIOGRAM TRACING: CPT | Performed by: PSYCHIATRY & NEUROLOGY

## 2023-08-31 PROCEDURE — 85610 PROTHROMBIN TIME: CPT | Performed by: NURSE PRACTITIONER

## 2023-08-31 PROCEDURE — 99232 SBSQ HOSP IP/OBS MODERATE 35: CPT | Performed by: PSYCHIATRY & NEUROLOGY

## 2023-08-31 PROCEDURE — 93010 ELECTROCARDIOGRAM REPORT: CPT | Performed by: INTERNAL MEDICINE

## 2023-08-31 RX ORDER — KETAMINE HYDROCHLORIDE 10 MG/ML
0.35 INJECTION INTRAMUSCULAR; INTRAVENOUS ONCE
Status: DISCONTINUED | OUTPATIENT
Start: 2023-08-31 | End: 2023-08-31

## 2023-08-31 RX ORDER — VENLAFAXINE HYDROCHLORIDE 75 MG/1
150 CAPSULE, EXTENDED RELEASE ORAL
Status: DISCONTINUED | OUTPATIENT
Start: 2023-09-01 | End: 2023-09-11 | Stop reason: HOSPADM

## 2023-08-31 RX ORDER — DOCUSATE SODIUM 100 MG/1
100 CAPSULE, LIQUID FILLED ORAL ONCE AS NEEDED
Status: COMPLETED | OUTPATIENT
Start: 2023-08-31 | End: 2023-08-31

## 2023-08-31 RX ORDER — WARFARIN SODIUM 2 MG/1
2 TABLET ORAL
Status: DISCONTINUED | OUTPATIENT
Start: 2023-08-31 | End: 2023-09-01

## 2023-08-31 RX ADMIN — VENLAFAXINE HYDROCHLORIDE 112.5 MG: 37.5 CAPSULE, EXTENDED RELEASE ORAL at 08:06

## 2023-08-31 RX ADMIN — DOCUSATE SODIUM 100 MG: 100 CAPSULE, LIQUID FILLED ORAL at 17:09

## 2023-08-31 RX ADMIN — PRAZOSIN HYDROCHLORIDE 2 MG: 1 CAPSULE ORAL at 20:28

## 2023-08-31 RX ADMIN — WARFARIN SODIUM 2 MG: 2 TABLET ORAL at 17:11

## 2023-08-31 RX ADMIN — ACETAMINOPHEN 650 MG: 325 TABLET, FILM COATED ORAL at 20:28

## 2023-08-31 RX ADMIN — TEMAZEPAM 7.5 MG: 7.5 CAPSULE ORAL at 21:40

## 2023-08-31 RX ADMIN — AMIODARONE HYDROCHLORIDE 200 MG: 200 TABLET ORAL at 08:06

## 2023-08-31 RX ADMIN — PREGABALIN 25 MG: 25 CAPSULE ORAL at 20:28

## 2023-08-31 RX ADMIN — LURASIDONE HYDROCHLORIDE 60 MG: 40 TABLET, FILM COATED ORAL at 17:09

## 2023-08-31 RX ADMIN — PREGABALIN 25 MG: 25 CAPSULE ORAL at 08:06

## 2023-08-31 RX ADMIN — PANTOPRAZOLE SODIUM 40 MG: 40 TABLET, DELAYED RELEASE ORAL at 08:06

## 2023-08-31 NOTE — NURSING NOTE
Behavior   Note any precipitants to event or behavior   Describe level and action of any aggressive behavior or speech and associated interventions.     Anxiety: Patient denies at this time  Depression: Patient denies at this time  Pain  0  AVH   no  S/I   no  Plan  no  H/I   no  Plan  no    Affect   flat      Note: Pt calm and cooperative. Denies SI/HI/AVH. Refused to participate in group and self isolated in room. Compliant with scheduled medications. Pt voiced anxiety r/t when she would be receiving Ketamine. No other concerns voiced at this time.       Intervention    PRN medication utilized:  no    Instructed in medication usage and effects  Medications administered as ordered  Encouraged to verbalize needs      Response    Verbalized understanding   Did patient take medications as ordered yes   Did patient interact with assessment?  yes     Plan    Will monitor for safety  Will monitor every 15 minutes as ordered  Will evaluate and promote the plan of care    Last BM:  unknown date  (Please chart in I/O as well)

## 2023-08-31 NOTE — PROGRESS NOTES
"Pt reports she does not feel well. Pt asked about getting \"my infusion.\" Pt states \"if they stop this, I have no hope this is my last resort.\" Pt refused to answer if she was having active SI\HI. Pt denies AVH at this time.   "

## 2023-08-31 NOTE — PROGRESS NOTES
Psychiatry Progress Note    8/31/2023    Legal Status: Voluntary    Chief Complaint: depression    Subjective:  Patient is a 53 y.o. female who was hospitalized for depression.    Pt was seen in the common area of the adult unit. Pt states she feels better today and more alert. Pt reports feeling anxious about the ketamine treatment due to her previous responses. Pt states her mood is more stable and is not having as prominent mood swings.  Pt is not getting ketamine this day.  Also, pt reports that she has had problems with anesthesia in the past.      Pt states the handouts have been helpful and has learned ways to better cope with her anxiety and depression such as breathing and imagining rivers.     Pt reports feeling anxious about going home because she is still alone. Pt states she feels better about asking friends and people in her [employer] for help. Pt continues to hold security blanket around her, she is appropriate with item     Pt denies SI/HI/AVH.    Patient reports medications are effective.    Objective     Vital Signs    Vitals:    08/29/23 1900 08/30/23 0829 08/30/23 1900 08/31/23 0800   BP: 119/63 113/58  111/68   BP Location: Left arm Left arm  Left arm   Patient Position: Sitting Sitting  Lying   Pulse: 60 60 60 65   Resp: 18 20 18 18   Temp: 97 °F (36.1 °C) 97.2 °F (36.2 °C) 98.1 °F (36.7 °C) 97.3 °F (36.3 °C)   TempSrc: Tympanic Tympanic Tympanic Temporal   SpO2: 99% 99% 100% 99%   Weight:       Height:           Physical Exam: as of today, 08/31/23   General Appearance: alert and cooperative,  Hygiene:   fair  Gait & Station: Normal  Musculoskeletal: No tremors or abnormal involuntary movements    Mental Status Exam: as of today, 08/31/23   Cooperation:  Cooperative  Eye Contact:  Fair  Psychomotor Behavior:  Appropriate  Mood: Sad/Depressed and Anxious/Nervous  Affect:  mood-congruent  Speech:  Monotone  Thought Process:  Coherent  Associations: Goal Directed  Thought Content:     Mood  congruent   Suicidal:  None   Homicidal:  None   Hallucinations:  None   Delusion:  None  Cognitive Functioning:   Consciousness: awake, alert, and oriented  Reliability:  fair  Insight:  Fair  Judgement:  Fair  Impulse Control:  Fair    Lab Results: Results source: EMR   Lab Results (last 24 hours)       Procedure Component Value Units Date/Time    Protime-INR [[id number]]  (Abnormal) Collected: 08/31/23 0742    Specimen: Blood Updated: 08/31/23 0756     Protime 30.7 Seconds      INR 3.05    Narrative:      Therapeutic range for most indications is 2.0-3.0 INR,  or 2.5-3.5 for mechanical heart valves.            Radiology Results:  Imaging Results (Last 24 Hours)       ** No results found for the last 24 hours. **            Medicine:   Current Facility-Administered Medications:     acetaminophen (TYLENOL) tablet 650 mg, 650 mg, Oral, Q4H PRN, Curtis Maria II, MD, 650 mg at 08/29/23 0019    aluminum-magnesium hydroxide-simethicone (MAALOX MAX) 400-400-40 MG/5ML suspension 15 mL, 15 mL, Oral, Q6H PRN, Curtis Maria II, MD    amiodarone (PACERONE) tablet 200 mg, 200 mg, Oral, Daily, Curtis Maria II, MD, 200 mg at 08/31/23 0806    clonazePAM (KlonoPIN) tablet 0.5 mg, 0.5 mg, Oral, BID PRN, Curtis Maria II, MD    cloNIDine (CATAPRES) tablet 0.1 mg, 0.1 mg, Oral, Q4H PRN, Curtis Maria II, MD    hydrOXYzine pamoate (VISTARIL) capsule 50 mg, 50 mg, Oral, Q6H PRN, Curtis Maria II, MD, 50 mg at 08/29/23 1615    loperamide (IMODIUM) capsule 2 mg, 2 mg, Oral, Q2H PRN, Curtis Maria II, MD    Lurasidone HCl (LATUDA) tablet 60 mg, 60 mg, Oral, Daily With Dinner, Curtis Maria II, MD, 60 mg at 08/30/23 1705    magnesium hydroxide (MILK OF MAGNESIA) suspension 10 mL, 10 mL, Oral, Daily PRN, Curtis Maria II, MD    ondansetron ODT (ZOFRAN-ODT) disintegrating tablet 4 mg, 4 mg, Oral, Q6H PRN, Curtis Maria II, MD    pantoprazole (PROTONIX) EC tablet  40 mg, 40 mg, Oral, Daily, Curtis Maria II, MD, 40 mg at 08/31/23 0806    Pharmacy to dose warfarin, , Does not apply, Continuous PRN, Omaira Willoughby APRN    prazosin (MINIPRESS) capsule 2 mg, 2 mg, Oral, Nightly, Curtis Maria II, MD, 2 mg at 08/30/23 2035    pregabalin (LYRICA) capsule 25 mg, 25 mg, Oral, BID, Curtis Maria II, MD, 25 mg at 08/31/23 0806    temazepam (RESTORIL) capsule 7.5 mg, 7.5 mg, Oral, Nightly, Curtis Maria II, MD, 7.5 mg at 08/30/23 2035    temazepam (RESTORIL) capsule 7.5 mg, 7.5 mg, Oral, Nightly PRN, Curtis Maria II, MD    venlafaxine XR (EFFEXOR-XR) 24 hr capsule 112.5 mg, 112.5 mg, Oral, Daily With Breakfast, Curtis Maira II, MD, 112.5 mg at 08/31/23 0806    warfarin (COUMADIN) tablet 2 mg, 2 mg, Oral, Daily, Omaira Willoughby APRN    Diagnoses/Assessment:     Suicidal ideation    Bipolar I disorder, most recent episode depressed, severe without psychotic features    Bereavement    Post traumatic stress disorder (PTSD)    Problems of guilt    Nihilism    Chronic atrial fibrillation    GERD (gastroesophageal reflux disease)    Ineffective coping      Treatment Plan:    1) Will continue care for the patient on the behavioral health unit at [facility] to ensure patient safety.  2) Will continue to provide treatment with the unit milieu, activities, therapies and psychopharmacological management.  3) Patient to be placed on or continued on  Q15 minute checks  and Suicide precautions.  4) Pertinent medical issues:   -Atrial fibrillation, chronic:   --Continue home amiodarone and Coumadin, pharmacy to dose. Paced.  --GERD:   --Continue PPI.  --ANANDA resolved  --Chronic pain/neuorapthy: cont Lyrica 25mg BID  5) Will order following labs: none  6) Will make the following medication changes:   Bipolar d/o, PTSD, guilt, grief, SI  --Cont Latuda to 60mg qDinner   --Cont Prazosin 2 mg qhs  --Cont Restoril 7.5mg qHS  --Cont Effexor  XR to 112.5mg daily with breakfast   --Hold Ketamine infusions              --First infusion, 7/26/23 @ 0.4mg/kg over 45 min; Pre MADRS 51  --Second, 7/28/23 @ 0.45 mg/kg over 45 min; Pre BRANDI 38  7) Will continue discharge planning for patient: outpatient psychiatric care, outpatient medical care, safety planning and placement as appropriate.    Treatment plan and medication risks and benefits discussed with: Patient and treatment team    DESIREE Voss  08/31/23 at 13:50 CDT

## 2023-08-31 NOTE — NURSING NOTE
Behavior   Note any precipitants to event or behavior   Describe level and action of any aggressive behavior or speech and associated interventions.     Anxiety: Excess Worry and Easily fatigued  Depression: Patient denies at this time  Pain  0  AVH   no  S/I   no  Plan  no  H/I   no  Plan  no    Affect   euthymic/normal      Note: Pt seen in the common area this AM. She is medication compliant and denies SI/HI/AVH. She denies any depression this morning but reports anxiety about her ketamine treatment. Pt frequently interacts with staff and peers.       Intervention    PRN medication utilized:  no    Instructed in medication usage and effects  Medications administered as ordered  Encouraged to verbalize needs      Response    Verbalized understanding   Did patient take medications as ordered yes   Did patient interact with assessment?  yes     Plan    Will monitor for safety  Will monitor every 15 minutes as ordered  Will evaluate and promote the plan of care    Last BM:  unknown date  (Please chart in I/O as well)

## 2023-08-31 NOTE — PROGRESS NOTES
"Anticoagulation by Pharmacy - Warfarin    Wing Hickman is a 53 y.o.female who has been consulted for warfarin for atrial fibrillation.    Home regimen: Warfarin 6 mg MWFSun and 3 mg TThSat (Average daily warfarin each week: 5 mg/day)   INR Goal: 2-3    Objective:  [Ht: 157.5 cm (62.01\"); Wt: 80.7 kg (178 lb)]    Lab Results   Component Value Date    INR 3.05 (H) 08/31/2023    INR 2.77 (H) 08/29/2023    INR 2.46 (H) 08/27/2023    PROTIME 30.7 (H) 08/31/2023    PROTIME 28.5 (H) 08/29/2023    PROTIME 26.1 (H) 08/27/2023     Lab Results   Component Value Date    HGB 13.7 08/26/2023    HGB 12.6 08/23/2023    HGB 13.0 08/22/2023    HCT 41.6 08/26/2023    HCT 39.1 08/23/2023    HCT 40.1 08/22/2023     08/26/2023     08/23/2023     (L) 08/22/2023       Recent Warfarin Administrations       The 5 most recent administrations since 08/17/2023 are shown below each listed medication.    Warfarin Sodium         Order Dose Date Given     warfarin (COUMADIN) tablet 6 mg 6 mg 08/21/2023                    Assessment  H/H/plts 13.7/41.6/175, last checked 8/26. No signs or symptoms of bleeding noted.   Interacting medications: amiodarone and venlafaxine  INR 3.05 8/31    Plan:  Give warfarin 2 mg PO @ 1800 tonight  PT/INR ordered every other day per patient request.   Pharmacy will continue to follow    Thank you,     Carina Patel, Pharm.D.   8/31/2023  12:18 CDT    "

## 2023-08-31 NOTE — NURSING NOTE
"Signee offered to change midline dressing. Pt denies dressing change and stated \"Why did I even get this thing if I am not going to get my infusions\".   "

## 2023-08-31 NOTE — PLAN OF CARE
Goal Outcome Evaluation:  Plan of Care Reviewed With: patient  Patient Agreement with Plan of Care: agrees     Progress: no change  Outcome Evaluation: Pt seen in the common area this AM. She continues to be medication compliant and denies SI/HI/AVH. She was tearful when she found out she was not continuing with ketamine treatments today. She reports that she is feeling better today.

## 2023-08-31 NOTE — PLAN OF CARE
Goal Outcome Evaluation:  Plan of Care Reviewed With: patient  Patient Agreement with Plan of Care: agrees  Consent Given to Review Plan with: Refused  Progress: no change  Outcome Evaluation: Pt concerned r/t recieving Ketamine injection. Pt has slept 6.25 hrs so far this shift.

## 2023-09-01 PROCEDURE — 93010 ELECTROCARDIOGRAM REPORT: CPT | Performed by: INTERNAL MEDICINE

## 2023-09-01 PROCEDURE — 93005 ELECTROCARDIOGRAM TRACING: CPT | Performed by: PSYCHIATRY & NEUROLOGY

## 2023-09-01 PROCEDURE — 99233 SBSQ HOSP IP/OBS HIGH 50: CPT | Performed by: PSYCHIATRY & NEUROLOGY

## 2023-09-01 RX ORDER — SODIUM CHLORIDE 9 MG/ML
INJECTION, SOLUTION INTRAVENOUS
Status: COMPLETED
Start: 2023-09-01 | End: 2023-09-01

## 2023-09-01 RX ADMIN — PRAZOSIN HYDROCHLORIDE 2 MG: 1 CAPSULE ORAL at 20:19

## 2023-09-01 RX ADMIN — PANTOPRAZOLE SODIUM 40 MG: 40 TABLET, DELAYED RELEASE ORAL at 08:03

## 2023-09-01 RX ADMIN — SODIUM CHLORIDE 28.2 MG: 9 INJECTION, SOLUTION INTRAVENOUS at 10:42

## 2023-09-01 RX ADMIN — SODIUM CHLORIDE 1000 ML: 9 INJECTION, SOLUTION INTRAVENOUS at 10:47

## 2023-09-01 RX ADMIN — PREGABALIN 25 MG: 25 CAPSULE ORAL at 20:19

## 2023-09-01 RX ADMIN — AMIODARONE HYDROCHLORIDE 200 MG: 200 TABLET ORAL at 08:03

## 2023-09-01 RX ADMIN — LURASIDONE HYDROCHLORIDE 60 MG: 40 TABLET, FILM COATED ORAL at 17:10

## 2023-09-01 RX ADMIN — HYDROXYZINE PAMOATE 50 MG: 50 CAPSULE ORAL at 20:26

## 2023-09-01 RX ADMIN — VENLAFAXINE HYDROCHLORIDE 150 MG: 75 CAPSULE, EXTENDED RELEASE ORAL at 08:03

## 2023-09-01 RX ADMIN — PREGABALIN 25 MG: 25 CAPSULE ORAL at 08:03

## 2023-09-01 NOTE — PROGRESS NOTES
"Met with pt this date, pt states she is upset she did not receive infusion yesterday and states, \"this is my last hope.\" Pt states she is feeling \"down.\" Pt would not clarify as to if she was having SI. Pt denied HI\AVH at this time. Pt encouraged to attend group this date.   "

## 2023-09-01 NOTE — NURSING NOTE
Behavior   Note any precipitants to event or behavior   Describe level and action of any aggressive behavior or speech and associated interventions.     Anxiety: Excess Worry and Restless/Edgy  Depression: depressed mood and hopelessness  Pain  0  AVH   no  S/I   no  Plan  no  H/I   no  Plan  no    Affect   euthymic/normal      Note: Pt is alert et noted to be sitting at the dining room table early this am. She c/o increased anxiety et said that she was still mad about her not receiving ketamine on previous day. Pt said that she becomes upset when she is not kept updated on her tx. She said that she had given up hope et has been depressed for 30+ years. Pt says that this is her last chance at depression going away. She denies HI, SI et hallucinations She remains NPO until after infusion. Pt given am meds with sip of water.       Intervention    PRN medication utilized:  no    Instructed in medication usage and effects  Medications administered as ordered  Encouraged to verbalize needs      Response    Verbalized understanding   Did patient take medications as ordered yes   Did patient interact with assessment?  yes     Plan    Will monitor for safety  Will monitor every 15 minutes as ordered  Will evaluate and promote the plan of care    Last BM:  unknown date  (Please chart in I/O as well)

## 2023-09-01 NOTE — NURSING NOTE
"Behavior   Note any precipitants to event or behavior   Describe level and action of any aggressive behavior or speech and associated interventions.     Anxiety: Excess Worry and Restless/Edgy  Depression: Patient denies at this time  Pain  0  AVH   no  S/I   no  Plan  no  H/I   no  Plan  no    Affect   euthymic/normal      Note:Patient was seen in the dning area, sitting alone at a table. Patient was anxious, but refused to take PRN medication. Patient was pleasant, cooperative with assessment. Patient denied SI/HI/AVH/Plan. Patient denied depression. Patient was wrapped in a blanket. Patient requested Restoril for sleep, given per order. Patient requested Tylenol for headache at \"3\" on scale, given per order with relief. Patient was educated on Pain Assessment Process and was given handouts on Pain Scale Information and Pain Assessment. Patient was receptive to the information given. Patient is independent and can make needs known to staff. Will follow.       Intervention    PRN medication utilized:  yes - Tylenol    Instructed in medication usage and effects  Medications administered as ordered  Encouraged to verbalize needs      Response    Verbalized understanding   Did patient take medications as ordered yes   Did patient interact with assessment?  yes     Plan    Will monitor for safety  Will monitor every 15 minutes as ordered  Will evaluate and promote the plan of care          "

## 2023-09-01 NOTE — PLAN OF CARE
"Goal Outcome Evaluation:  Plan of Care Reviewed With: patient  Patient Agreement with Plan of Care: agrees     Progress: no change  Outcome Evaluation: Pt has been alert et cooperative with continued c/o vision being \"hazy\". She denies that vision is double or blured but uses a wave like hand motion when trying to describe it. Pt also says that she got \"the giggles\" et has been observed siting alone at the table in common area laughing et smiling to herself. She is currently in bed due to c/o increased fatigue.         "

## 2023-09-01 NOTE — PLAN OF CARE
"Treatment team met with patient to discuss and review treatment plan. Pt was encouraged to discuss their reason for admission, as well as their goals for treatment. Team discussed anticipated interventions and treatment modalities that will be used to address goals.Pt given opportunity to discuss any concerns re: treatment plan. Discussed current progress with admission, patient voices feeling some better, but still being very sleepy. Discussed current Ketamine orders and plans for administration this date. Patient discusses \"trying not to think\"  when asked about suicidal thoughts and not wanting to live. Discussed ongoing treatment plan and goals for admission.     Team Members present during meeting:    MD: Dr. ANABEL SIERRAN: Tanesha   RN: Maribel Nair  SW: Katelyn   RT: Elise   Other:     "

## 2023-09-01 NOTE — NURSING NOTE
"Ketamine infusion started at 1043 this am. Midline remains to right arm et flushes well. No signs of infx to site. Initial V/S ntoed to be 148/77, 72p, 100% 02 sat RA, 18R, 97.3 temp. Pt c/o increased anxiety with V/S rechecked immediately before with BP noted to be 131/64. Room quiet with lights off. Pt is relaxed in bed with HOB elevated at 45 degrees. Eyes closed et no signs of distress or discomfort. Will monitor.     Ketamine completed with pt c/o mild vision \"haziness\" after complete. She is currently in w/c for fall precaution. Meal orders restarted.   "

## 2023-09-01 NOTE — PLAN OF CARE
Problem: Adult Behavioral Health Plan of Care  Goal: Plan of Care Review  Outcome: Ongoing, Progressing  Flowsheets  Taken 9/1/2023 1403 by Katelyn Wolfe MSW  Consent Given to Review Plan with: refused  Progress: no change  Patient Agreement with Plan of Care: agrees  Outcome Evaluation: Pt making statements of feeling hopeless. Pt denies HI\AVH at this time.  Taken 9/1/2023 1344 by Lashanda Sam RN  Plan of Care Reviewed With: patient  Goal: Patient-Specific Goal (Individualization)  Outcome: Ongoing, Progressing  Flowsheets  Taken 8/30/2023 1249  Anxieties, Fears or Concerns: denies  Taken 8/25/2023 1605  Patient Personal Strengths:   ability to maintain sobriety   resilient   stable living environment  Patient-Specific Goals (Include Timeframe): pt to have no SI\HI\AVH at time of d\c and to verbalize 1-3 learned coping skills  Individualized Care Needs: group, education, safety planning, aftecare, family session  Patient Vulnerabilities:   adverse childhood experience(s)   family/relationship conflict   lacks insight into illness   poor physical health   poor impulse control   recent loss   limited social skills   limited support system   occupational insecurity   housing insecurity   history of unsuccessful treatment  Goal: Optimized Coping Skills in Response to Life Stressors  Outcome: Ongoing, Progressing  Flowsheets (Taken 9/1/2023 1403)  Optimized Coping Skills in Response to Life Stressors: making progress toward outcome  Intervention: Promote Effective Coping Strategies  Flowsheets (Taken 9/1/2023 1403)  Supportive Measures:   active listening utilized   verbalization of feelings encouraged  Goal: Develops/Participates in Therapeutic [place] to Support Successful Transition  Outcome: Ongoing, Progressing  Flowsheets (Taken 9/1/2023 1403)  Develops/Participates in Therapeutic [place] to Support Successful Transition: making progress toward outcome  Intervention: Foster Therapeutic  [place]  Flowsheets (Taken 9/1/2023 1403)  Trust Relationship/Rapport:   care explained   choices provided   reassurance provided   thoughts/feelings acknowledged   emotional support provided   empathic listening provided   questions answered   questions encouraged  Intervention: Mutually Develop Transition Plan  Flowsheets  Taken 9/1/2023 1403  Offered/Gave Vendor List: no  Taken 8/30/2023 1249  Transition Support: follow-up care discussed  Patient/Family Anticipates Transition to: home  Taken 8/28/2023 1351  Anticipated Discharge Disposition: home or self-care  Readmission Within the Last 30 Days: previous discharge plan unsuccessful  Taken 8/25/2023 1605  Outpatient/Agency/Support Group Needs:   case management   intensive outpatient services   outpatient medication management   outpatient counseling  Discharge Coordination/Progress: [facility] or [facility]  Transportation Anticipated: public transportation  Transportation Concerns: no car  Current Discharge Risk:   psychiatric illness   lack of support system/caregiver  Concerns to be Addressed:   patient refuses services   adjustment to diagnosis/illness   basic needs   decision-making   coping/stress   compliance issue   cognitive/perceptual   care coordination/care conferences   discharge planning   grief and loss   relationship   mental health   medication   suicidal  Patient/Family Anticipated Services at Transition:      mental health services   outpatient care  Patient's Choice of Community Agency(s): [facility] or [facility]     Problem: Suicide Risk  Goal: Absence of Self-Harm  Intervention: Promote Psychosocial Wellbeing  Recent Flowsheet Documentation  Taken 9/1/2023 1403 by Katelyn Wolfe MSW  Supportive Measures:   active listening utilized   verbalization of feelings encouraged     Goal Outcome Evaluation:  Plan of Care Reviewed With: patient  Patient Agreement with Plan of Care:  agrees  Consent Given to Review Plan with: refused  Progress: no change  Outcome Evaluation: Pt making statements of feeling hopeless. Pt denies HI\AVH at this time.

## 2023-09-01 NOTE — PLAN OF CARE
"  Problem: Adult Behavioral Health Plan of Care  Goal: Plan of Care Review  Outcome: Ongoing, Progressing  Flowsheets  Taken 9/1/2023 0353  Progress: no change  Plan of Care Reviewed With: patient  Patient Agreement with Plan of Care: agrees  Outcome Evaluation: Patient remains anxious, but,  pleasant and cooperative with care. Patient feels comfort by wrapping in a blanket that her late  gave her.  Patient attended groups but stayed mostly to herself. Patient requested Restoril for sleep, given per order. Patient requested Tylenol for headache at \"3\" on scale, given per order with relief. Patient has slept 4.5 hours so far this shift.. Patient is independent and can make needs known to staff. Will follow.  Taken 8/31/2023 1940  Plan of Care Reviewed With: patient  Patient Agreement with Plan of Care: agrees   Goal Outcome Evaluation:  Plan of Care Reviewed With: patient  Patient Agreement with Plan of Care: agrees     Progress: no change  Outcome Evaluation: Patient remains anxious, but,  pleasant and cooperative with care. Patient feels comfort by wrapping in a blanket that her late  gave her.  Patient attended groups but stayed mostly to herself. Patient requested Restoril for sleep, given per order. Patient requested Tylenol for headache at \"3\" on scale, given per order with relief. Patient has slept 4.5 hours so far this shift.. Patient is independent and can make needs known to staff. Will follow.         "

## 2023-09-01 NOTE — PROGRESS NOTES
"Anticoagulation by Pharmacy - Warfarin    Wing Hickman is a 53 y.o.female  [Ht: 157.5 cm (62.01\"); Wt: 80.7 kg (178 lb)] on Warfarin for indication of Atrial fibrillation.    Goal INR: 2-3  Home dose is Warfarin 6 mg MWFSun and 3 mg TThSat (Average daily warfarin each week: 5 mg/day)   INR levels are ordered every other day    Today's INR:   Lab Results   Component Value Date    INR 3.05 (H) 08/31/2023          Lab Results   Component Value Date    INR 3.05 (H) 08/31/2023    INR 2.77 (H) 08/29/2023    INR 2.46 (H) 08/27/2023    PROTIME 30.7 (H) 08/31/2023    PROTIME 28.5 (H) 08/29/2023    PROTIME 26.1 (H) 08/27/2023     Lab Results   Component Value Date    HGB 13.7 08/26/2023    HGB 12.6 08/23/2023    HGB 13.0 08/22/2023     Lab Results   Component Value Date    HCT 41.6 08/26/2023    HCT 39.1 08/23/2023    HCT 40.1 08/22/2023     Lab Results   Component Value Date     08/26/2023     08/23/2023     (L) 08/22/2023       Recent Warfarin Administrations       The 5 most recent administrations since 08/25/2023 are shown below each listed medication.    Warfarin Sodium         Order Dose Date Given     warfarin (COUMADIN) tablet 2 mg 2 mg 08/31/2023     warfarin (COUMADIN) tablet 3 mg 3 mg 08/30/2023      3 mg 08/29/2023      3 mg 08/28/2023      3 mg 08/27/2023                  Assessment/Plan:  Reviewed above labs. INR-no level today.   INR yesterday was 3.05. Level was supra therapeutic. Pt did receive dose of warfarin 2 mg last night. No changes in medication list. No bleeding noted. Will hold warfarin tonight. Rx will continue to follow and adjust dose accordingly.      Kym Dominique, PharmD  09/01/23 12:02 CDT     "

## 2023-09-02 LAB
INR PPP: 2.63 (ref 0.8–1.2)
PROTHROMBIN TIME: 27.5 SECONDS (ref 11.1–15.3)

## 2023-09-02 PROCEDURE — 99232 SBSQ HOSP IP/OBS MODERATE 35: CPT | Performed by: PSYCHIATRY & NEUROLOGY

## 2023-09-02 PROCEDURE — 85610 PROTHROMBIN TIME: CPT | Performed by: NURSE PRACTITIONER

## 2023-09-02 RX ORDER — CLONAZEPAM 0.5 MG/1
0.25 TABLET ORAL NIGHTLY
Status: DISCONTINUED | OUTPATIENT
Start: 2023-09-02 | End: 2023-09-11 | Stop reason: HOSPADM

## 2023-09-02 RX ORDER — WARFARIN SODIUM 2 MG/1
2 TABLET ORAL
Status: DISCONTINUED | OUTPATIENT
Start: 2023-09-02 | End: 2023-09-04

## 2023-09-02 RX ORDER — KETAMINE HYDROCHLORIDE 10 MG/ML
0.4 INJECTION INTRAMUSCULAR; INTRAVENOUS ONCE
Status: DISCONTINUED | OUTPATIENT
Start: 2023-09-02 | End: 2023-09-02

## 2023-09-02 RX ADMIN — LURASIDONE HYDROCHLORIDE 60 MG: 40 TABLET, FILM COATED ORAL at 17:21

## 2023-09-02 RX ADMIN — PREGABALIN 25 MG: 25 CAPSULE ORAL at 08:34

## 2023-09-02 RX ADMIN — MAGNESIUM HYDROXIDE 10 ML: 2400 SUSPENSION ORAL at 08:34

## 2023-09-02 RX ADMIN — PANTOPRAZOLE SODIUM 40 MG: 40 TABLET, DELAYED RELEASE ORAL at 08:34

## 2023-09-02 RX ADMIN — AMIODARONE HYDROCHLORIDE 200 MG: 200 TABLET ORAL at 08:33

## 2023-09-02 RX ADMIN — VENLAFAXINE HYDROCHLORIDE 150 MG: 75 CAPSULE, EXTENDED RELEASE ORAL at 08:33

## 2023-09-02 RX ADMIN — PRAZOSIN HYDROCHLORIDE 2 MG: 1 CAPSULE ORAL at 20:30

## 2023-09-02 RX ADMIN — PREGABALIN 25 MG: 25 CAPSULE ORAL at 20:30

## 2023-09-02 RX ADMIN — CLONAZEPAM 0.25 MG: 0.5 TABLET ORAL at 20:30

## 2023-09-02 RX ADMIN — WARFARIN SODIUM 2 MG: 2 TABLET ORAL at 17:21

## 2023-09-02 NOTE — PLAN OF CARE
Goal Outcome Evaluation:  Plan of Care Reviewed With: patient  Patient Agreement with Plan of Care: agrees     Progress: improving  Outcome Evaluation: pt has been calm and cooperative, eating well, and takes part in unit activities.  pt denies any problems at this time.

## 2023-09-02 NOTE — NURSING NOTE
"Behavior   Note any precipitants to event or behavior   Describe level and action of any aggressive behavior or speech and associated interventions.     Anxiety: Patient denies at this time  Depression: Patient denies at this time  Pain  0  AVH   no  S/I   no  Plan  no  H/I   no  Plan  no    Affect   euthymic/normal      Note:  pt seen in personal room this am, alert and oriented x 4, calm, cooperative.  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  Pt had MOM and is currently awaiting response.  Pt is pleasant, spoke at length about losses and crisis stemming from great grief prior to admission.  Pt states that currently her only stressor is that she has a dog that was boarding with a friend and that dog has apparently escaped friends house and she is worried that he has been picked up or is being mistreated.  Pt states that friend has contacted animal shelters and hopes to find him soon.  Pt states that she feels ketamine treatments are going well, that her only concerns at this time aside from constipation is not being able to sleep after not taking restoril last night, hoped to have medication changed today, though per mht rounds sheets, pt did sleep several hours. Pt also concerned about having had a number of medication discontinued that she was on prior to admission, cited hctz, motoprolol, magnesium, potassium, and states there are others.  Pt states that,\"I didn't eat or drink for 3 days prior to coming in, so I understand those were a concern, but now I feel like my legs and feet are starting to swell and maybe those need to be added back.\"  Pt states,\"I'm supposed to see the hospitalist this morning for that.\"  Pt states she greatly misses her  who is passed, as well as a pet that passed, though denies si, hi, " "avh, anxiety and depression at this time.  Pt is compliant with all medication thus far this shift.    Intervention    PRN medication utilized:  yes - Milk of magnesia  for constipation    Instructed in medication usage and effects  Medications administered as ordered  Encouraged to verbalize needs      Response    Verbalized understanding   Did patient take medications as ordered yes   Did patient interact with assessment?  yes     Plan    Will monitor for safety  Will monitor every 15 minutes as ordered  Will evaluate and promote the plan of care    Last BM:  pt states she has had a few very small \"coni.\" Complains of constipation and received MOM, awaiting response.  (Please chart in I/O as well)  "

## 2023-09-02 NOTE — NURSING NOTE
"Behavior   Note any precipitants to event or behavior   Describe level and action of any aggressive behavior or speech and associated interventions.     Anxiety: Excess Worry, Restless/Edgy, and Patient denies at this time  Depression: Patient denies at this time  Pain  0  AVH   no  S/I   no  Plan  no  H/I   no  Plan  no    Affect   mood-congruent      Note: Patient was seen in the dining area, sitting at a table, talking with a peer. Patient was worried about taking Restoril at bedtime. Patient stated, \"I don't want to take Restoril because it leaves me with a hangover and I don't feel like going to groups. I am afraid if I take it they will be upset because I don't feel like going to group.\" Patient decided not to take the Restoril. Patient came to nurses desk later and requested Vistaril for anxiety. Given per order. Patient denied SI/HI/AVH/Plan. Patient was educated on Fall Prevention, Harm Prevention and Safety in the Hospital. Handouts were given on each. Patient was receptive to the information given. Patient is independent and can make needs known to staff.  Will follow.       Intervention    PRN medication utilized:  yes - Vistaril    Instructed in medication usage and effects  Medications administered as ordered  Encouraged to verbalize needs      Response    Verbalized understanding   Did patient take medications as ordered yes   Did patient interact with assessment?  yes     Plan    Will monitor for safety  Will monitor every 15 minutes as ordered  Will evaluate and promote the plan of care    "

## 2023-09-02 NOTE — PROGRESS NOTES
"Anticoagulation by Pharmacy - Warfarin    Wing Hickman is a 53 y.o.female  [Ht: 157.5 cm (62.01\"); Wt: 80.7 kg (178 lb)] on Warfarin for indication of Atrial fibrillation.    Goal INR: 2-3  Home dose is Warfarin 6 mg MWFSun and 3 mg TThSat (Average daily warfarin each week: 5 mg/day)   INR levels are ordered every other day    Today's INR:   Lab Results   Component Value Date    INR 2.63 (H) 09/02/2023          Lab Results   Component Value Date    INR 2.63 (H) 09/02/2023    INR 3.05 (H) 08/31/2023    INR 2.77 (H) 08/29/2023    PROTIME 27.5 (H) 09/02/2023    PROTIME 30.7 (H) 08/31/2023    PROTIME 28.5 (H) 08/29/2023     Lab Results   Component Value Date    HGB 13.7 08/26/2023    HGB 12.6 08/23/2023    HGB 13.0 08/22/2023     Lab Results   Component Value Date    HCT 41.6 08/26/2023    HCT 39.1 08/23/2023    HCT 40.1 08/22/2023     Lab Results   Component Value Date     08/26/2023     08/23/2023     (L) 08/22/2023       Recent Warfarin Administrations       The 5 most recent administrations since 08/26/2023 are shown below each listed medication.    Warfarin Sodium         Order Dose Date Given     warfarin (COUMADIN) tablet 2 mg 2 mg 08/31/2023     warfarin (COUMADIN) tablet 3 mg 3 mg 08/30/2023      3 mg 08/29/2023      3 mg 08/28/2023      3 mg 08/27/2023                    Assessment/Plan:  Reviewed above labs. INR is 2.63.  INR is  therapeutic. Pt did NOT receive dose of warfarin last night. Reviewed medication list. Concurrent medications include amiodarone and venlafaxine No bleeding noted. Will start back warfarin at 2 mg daily. Rx will continue to follow and adjust dose accordingly.      Kym Dominique, PharmD  09/02/23 12:46 CDT     "

## 2023-09-02 NOTE — PLAN OF CARE
"  Problem: Adult Behavioral Health Plan of Care  Goal: Plan of Care Review  Outcome: Ongoing, Progressing  Flowsheets  Taken 9/2/2023 0427  Progress: no change  Plan of Care Reviewed With: patient  Patient Agreement with Plan of Care: agrees  Outcome Evaluation: Patient has remained cooperative with care. Patient voiced that she was worried about taking Restoril at bedtime. Patient stated, \"I don't want to take Restoril because it leaves me with a hangover and I don't feel like going to groups. I am afraid if I take it they will be upset because I don't feel like going to group.\" Patient decided not to take the Restoril. Patient came to nurses desk later and requested Vistaril for anxiety. Given per order. No further complaints. Patient has slept 5.25 hours so far this shift.  Patient is independent and can make needs known to staff.  Will follow.  Taken 9/1/2023 1925  Plan of Care Reviewed With: patient  Patient Agreement with Plan of Care: agrees   Goal Outcome Evaluation:  Plan of Care Reviewed With: patient  Patient Agreement with Plan of Care: agrees     Progress: no change  Outcome Evaluation: Patient has remained cooperative with care. Patient voiced that she was worried about taking Restoril at bedtime. Patient stated, \"I don't want to take Restoril because it leaves me with a hangover and I don't feel like going to groups. I am afraid if I take it they will be upset because I don't feel like going to group.\" Patient decided not to take the Restoril. Patient came to nurses desk later and requested Vistaril for anxiety. Given per order. No further complaints. Patient has slept 5.25 hours so far this shift.  Patient is independent and can make needs known to staff.  Will follow.         "

## 2023-09-02 NOTE — PROGRESS NOTES
"Psychiatry Progress Note    9/1/2023    Legal Status: Voluntary    Chief Complaint: suicidal ideation and depression    Subjective:  Patient is a 53 y.o. female who was hospitalized for suicidal ideation and depression.    Patient seen in treatment team and during ketamine infusion.  She notes that she is feeling some better but notes that she \"trying not to think\" about suicidal thoughts and not wanting to live.    Patient tolerated the ketamine infusion without difficulty.  She did not have any confusion or sedation when seen on the unit later in the day.    Objective     Vital Signs    Vitals:    09/01/23 1120 09/01/23 1130 09/01/23 1140 09/01/23 1923   BP: 129/64 136/70 135/74 129/66   BP Location: Left arm Left arm Left arm Left arm   Patient Position: Lying Lying Lying Sitting   Pulse: 60 60 59 63   Resp: 18 18 18 18   Temp:    98.1 °F (36.7 °C)   TempSrc:    Temporal   SpO2: 99% 99% 100% 99%   Weight:       Height:           Physical Exam: as of today, 09/01/23   General Appearance: alert, appears stated age, and cooperative,  Hygiene:   good  Gait & Station: Normal  Musculoskeletal: No tremors or abnormal involuntary movements    Mental Status Exam: as of today, 09/01/23   Cooperation:  Cooperative  Eye Contact:  Good  Psychomotor Behavior:  Appropriate  Mood: Sad/Depressed  Affect:  blunted  Speech:  Normal  Thought Process:  Coherent  Associations: Goal Directed  Thought Content:     Mood congruent   Suicidal:   States that she is trying to not think about it.   Homicidal:  None   Hallucinations:  None   Delusion:  None  Cognitive Functioning:   Consciousness: awake and alert  Reliability:   adequate  Insight:   diminished  Judgement:   diminished  Impulse Control:   adequate    Lab Results: Results source: EMR   Lab Results (last 24 hours)       ** No results found for the last 24 hours. **            Radiology Results:  Imaging Results (Last 24 Hours)       ** No results found for the last 24 hours. ** "            Medicine:   Current Facility-Administered Medications:     acetaminophen (TYLENOL) tablet 650 mg, 650 mg, Oral, Q4H PRN, Curtis Maria II, MD, 650 mg at 08/31/23 2028    aluminum-magnesium hydroxide-simethicone (MAALOX MAX) 400-400-40 MG/5ML suspension 15 mL, 15 mL, Oral, Q6H PRN, Curtis Maria II, MD    amiodarone (PACERONE) tablet 200 mg, 200 mg, Oral, Daily, Curtis Maria II, MD, 200 mg at 09/01/23 0803    cloNIDine (CATAPRES) tablet 0.1 mg, 0.1 mg, Oral, Q4H PRN, Curtis Maria II, MD    hydrOXYzine pamoate (VISTARIL) capsule 50 mg, 50 mg, Oral, Q6H PRN, Curtis Maria II, MD, 50 mg at 09/01/23 2026    loperamide (IMODIUM) capsule 2 mg, 2 mg, Oral, Q2H PRN, Curtis Maria II, MD    Lurasidone HCl (LATUDA) tablet 60 mg, 60 mg, Oral, Daily With Dinner, Curtis Maria II, MD, 60 mg at 09/01/23 1710    magnesium hydroxide (MILK OF MAGNESIA) suspension 10 mL, 10 mL, Oral, Daily PRN, Curtis Maria II, MD    ondansetron ODT (ZOFRAN-ODT) disintegrating tablet 4 mg, 4 mg, Oral, Q6H PRN, Curtis Maria II, MD    pantoprazole (PROTONIX) EC tablet 40 mg, 40 mg, Oral, Daily, Curtis Maria II, MD, 40 mg at 09/01/23 0803    Pharmacy to dose warfarin, , Does not apply, Continuous PRN, Omaira Willoughby APRN    prazosin (MINIPRESS) capsule 2 mg, 2 mg, Oral, Nightly, Curtis Maria II, MD, 2 mg at 09/01/23 2019    pregabalin (LYRICA) capsule 25 mg, 25 mg, Oral, BID, Curtis Maria II, MD, 25 mg at 09/01/23 2019    temazepam (RESTORIL) capsule 7.5 mg, 7.5 mg, Oral, Nightly, Curtis Maria II, MD, 7.5 mg at 08/31/23 2140    temazepam (RESTORIL) capsule 7.5 mg, 7.5 mg, Oral, Nightly PRN, Curtis Maria II, MD    venlafaxine XR (EFFEXOR-XR) 24 hr capsule 150 mg, 150 mg, Oral, Daily With Breakfast, Marcela Manriquez MD, 150 mg at 09/01/23 0803    Diagnoses/Assessment:     Suicidal ideation    Bipolar I disorder, most  recent episode depressed, severe without psychotic features    Bereavement    Post traumatic stress disorder (PTSD)    Problems of guilt    Nihilism    Chronic atrial fibrillation    GERD (gastroesophageal reflux disease)    Ineffective coping      Treatment Plan:    1) Will continue care for the patient on the behavioral health unit at [facility] to ensure patient safety.  2) Will continue to provide treatment with the unit milieu, activities, therapies and psychopharmacological management.  3) Patient to be placed on or continued on  Q15 minute checks  and Suicide precautions.  4) Pertinent medical issues:   -Atrial fibrillation, chronic:   --Continue home amiodarone and Coumadin, pharmacy to dose. Paced.  --GERD:   --Continue PPI.  --ANANDA resolved  --Chronic pain/neuorapthy: cont Lyrica 25mg BID  5) Will order following labs: none  6) Will make the following medication changes:   Bipolar d/o, PTSD, guilt, grief, SI  --Cont Latuda 60mg qDinner   --Cont Prazosin 2 mg qhs  --Cont Restoril 7.5mg qHS  --Cont Effexor XR to 150mg daily with breakfast   --Ketamine infusions              --First infusion, 8/26/23 @ 0.4mg/kg over 45 min; Pre MADRS 51  --Second, 8/28/23 @ 0.45 mg/kg over 45 min; Pre MADRS 38  --Third, 9/1/23 @ 0.35mg/kg at 150ml/hr  --Plan for fourth on 9/3/23 at 0.4mg/kg.  7) Will continue discharge planning for patient: outpatient psychiatric care, outpatient medical care, safety planning and placement as appropriate.    Treatment plan and medication risks and benefits discussed with: Patient    >50 min spent (60 m) on care today including direct care, coordination of care, Ketamine infusion related time, documentation, chart review.    Marecla Manriquez MD  09/01/23 at 21:35 CDT

## 2023-09-02 NOTE — PROGRESS NOTES
"9/2/2023    Chief Complaint: suicidal ideation and depression    Subjective:  Patient is a 53 y.o. female that is currently inpt on the adult U today she is seen individually. Pt reports she feels better, she states she \"giggled\" for the first time in a long time yesterday. She does appear bright, she is smiling and talks about doing the DBT workbook pages  Pt is reality based, she is has been engaged with peers. She states she is hopeful for the future. She reports she enjoys listening to Dr. Vega podcasts and learning self-help techniques.   Pt reports poor sleep, she refused restoril last night due to making her feel \"hung over\"   Pt denies SI/HI/AVH     Objective     Vital Signs    Temp:  [97.3 °F (36.3 °C)-98.1 °F (36.7 °C)] 98.1 °F (36.7 °C)  Heart Rate:  [59-72] 61  Resp:  [16-20] 16  BP: (105-148)/(55-77) 105/55    Physical Exam:   General Appearance: alert, appears stated age, and cooperative,  Hygiene:   fair  Gait & Station: Normal  Musculoskeletal: No tremors or abnormal involuntary movements    Mental Status Exam:   Cooperation:  Cooperative  Eye Contact:  Good  Psychomotor Behavior:  Appropriate  Mood: Improving  Affect:  mood-congruent  Speech:  Normal  Thought Process:  Coherent  Associations: Circumstantial  Thought Content:     Mood congruent   Suicidal:  None   Homicidal:  None   Hallucinations:  None   Delusion:  None  Cognitive Functioning:   Consciousness: awake and alert  Reliability:  fair  Insight:  Fair  Judgement:  Fair  Impulse Control:  Fair    Lab Results (last 24 hours)       Procedure Component Value Units Date/Time    Protime-INR [[id number]]  (Abnormal) Collected: 09/02/23 0627    Specimen: Blood Updated: 09/02/23 0654     Protime 27.5 Seconds      INR 2.63    Narrative:      Therapeutic range for most indications is 2.0-3.0 INR,  or 2.5-3.5 for mechanical heart valves.          Imaging Results (Last 24 Hours)       ** No results found for the last 24 hours. **            Medicine: "   Current Facility-Administered Medications:     acetaminophen (TYLENOL) tablet 650 mg, 650 mg, Oral, Q4H PRN, Curtis Maria II, MD, 650 mg at 08/31/23 2028    aluminum-magnesium hydroxide-simethicone (MAALOX MAX) 400-400-40 MG/5ML suspension 15 mL, 15 mL, Oral, Q6H PRN, Curtis Maria II, MD    amiodarone (PACERONE) tablet 200 mg, 200 mg, Oral, Daily, Curtis Maria II, MD, 200 mg at 09/02/23 0833    cloNIDine (CATAPRES) tablet 0.1 mg, 0.1 mg, Oral, Q4H PRN, Curtis Maria II, MD    hydrOXYzine pamoate (VISTARIL) capsule 50 mg, 50 mg, Oral, Q6H PRN, Curtis Maria II, MD, 50 mg at 09/01/23 2026    loperamide (IMODIUM) capsule 2 mg, 2 mg, Oral, Q2H PRN, Curtis Maria II, MD    Lurasidone HCl (LATUDA) tablet 60 mg, 60 mg, Oral, Daily With Dinner, Curtis Maria II, MD, 60 mg at 09/01/23 1710    magnesium hydroxide (MILK OF MAGNESIA) suspension 10 mL, 10 mL, Oral, Daily PRN, Curtis Maria II, MD, 10 mL at 09/02/23 0834    ondansetron ODT (ZOFRAN-ODT) disintegrating tablet 4 mg, 4 mg, Oral, Q6H PRN, Curtis Maria II, MD    pantoprazole (PROTONIX) EC tablet 40 mg, 40 mg, Oral, Daily, Curtis Maria II, MD, 40 mg at 09/02/23 0834    Pharmacy to dose warfarin, , Does not apply, Continuous PRN, Omaira Willoughby APRN    prazosin (MINIPRESS) capsule 2 mg, 2 mg, Oral, Nightly, Curtis Maria II, MD, 2 mg at 09/01/23 2019    pregabalin (LYRICA) capsule 25 mg, 25 mg, Oral, BID, Curtis Maria II, MD, 25 mg at 09/02/23 0834    temazepam (RESTORIL) capsule 7.5 mg, 7.5 mg, Oral, Nightly, Curtis Maria II, MD, 7.5 mg at 08/31/23 2140    temazepam (RESTORIL) capsule 7.5 mg, 7.5 mg, Oral, Nightly PRN, Curtis Maria II, MD    venlafaxine XR (EFFEXOR-XR) 24 hr capsule 150 mg, 150 mg, Oral, Daily With Breakfast, Marcela Manriquez MD, 150 mg at 09/02/23 0833    Diagnoses/Assessment:     Suicidal ideation    Bipolar I disorder, most  recent episode depressed, severe without psychotic features    Bereavement    Post traumatic stress disorder (PTSD)    Problems of guilt    Nihilism    Chronic atrial fibrillation    GERD (gastroesophageal reflux disease)    Ineffective coping      Treatment Plan:    1) Will continue care for the patient on the behavioral health unit at [facility] to ensure patient safety.  2) Will continue to provide treatment with the unit milieu, activities, therapies and psychopharmacological management.  3) Patient to be placed on or continued on  Q15 minute checks  and Suicide precautions.  4) Pertinent medical issues:   -Atrial fibrillation, chronic:   --Continue home amiodarone and Coumadin, pharmacy to dose. Paced.  --GERD:   --Continue PPI.  --ANANDA resolved  --Chronic pain/neuorapthy: cont Lyrica 25mg BID  5) Will order following labs: none  6) Will make the following medication changes:   --Cont Latuda 60mg qDinner   --Cont Prazosin 2 mg qhs  --Cont Restoril 7.5mg qHS  --Cont Effexor XR to 150mg daily with breakfast   --Ketamine infusions              --First infusion, 8/26/23 @ 0.4mg/kg over 45 min; Pre MADRS 51  --Second, 8/28/23 @ 0.45 mg/kg over 45 min; Pre MADRS 38  --Third, 9/1/23 @ 0.35mg/kg at 150ml/hr  --Plan for fourth on 9/3/23 at 0.4mg/kg.  7) Will continue discharge planning as appropriate for patient.  8) Psychotherapy provided for less than 16 minutes.    Treatment plan and medication risks and benefits discussed with: Patient    DESIREE Voss  09/02/23  09:04 CDT

## 2023-09-03 PROCEDURE — 99232 SBSQ HOSP IP/OBS MODERATE 35: CPT | Performed by: PSYCHIATRY & NEUROLOGY

## 2023-09-03 PROCEDURE — 90833 PSYTX W PT W E/M 30 MIN: CPT | Performed by: PSYCHIATRY & NEUROLOGY

## 2023-09-03 RX ORDER — FUROSEMIDE 20 MG/1
10 TABLET ORAL DAILY
Status: DISCONTINUED | OUTPATIENT
Start: 2023-09-03 | End: 2023-09-11 | Stop reason: HOSPADM

## 2023-09-03 RX ADMIN — Medication 10 MG: at 15:44

## 2023-09-03 RX ADMIN — PRAZOSIN HYDROCHLORIDE 2 MG: 1 CAPSULE ORAL at 20:46

## 2023-09-03 RX ADMIN — CLONAZEPAM 0.25 MG: 0.5 TABLET ORAL at 20:46

## 2023-09-03 RX ADMIN — AMIODARONE HYDROCHLORIDE 200 MG: 200 TABLET ORAL at 08:12

## 2023-09-03 RX ADMIN — LURASIDONE HYDROCHLORIDE 60 MG: 40 TABLET, FILM COATED ORAL at 17:16

## 2023-09-03 RX ADMIN — WARFARIN SODIUM 2 MG: 2 TABLET ORAL at 17:16

## 2023-09-03 RX ADMIN — PANTOPRAZOLE SODIUM 40 MG: 40 TABLET, DELAYED RELEASE ORAL at 08:12

## 2023-09-03 RX ADMIN — PREGABALIN 25 MG: 25 CAPSULE ORAL at 20:46

## 2023-09-03 RX ADMIN — SODIUM CHLORIDE 32 MG: 9 INJECTION, SOLUTION INTRAVENOUS at 10:57

## 2023-09-03 RX ADMIN — PREGABALIN 25 MG: 25 CAPSULE ORAL at 08:11

## 2023-09-03 RX ADMIN — VENLAFAXINE HYDROCHLORIDE 150 MG: 75 CAPSULE, EXTENDED RELEASE ORAL at 08:11

## 2023-09-03 NOTE — PLAN OF CARE
Goal Outcome Evaluation:  Plan of Care Reviewed With: patient  Patient Agreement with Plan of Care: agrees     Progress: improving  Outcome Evaluation: Pt has slept approximately 4.5 hours thus far. No behavioral or safety concerns noted overnight.

## 2023-09-03 NOTE — NURSING NOTE
Behavior   Note any precipitants to event or behavior   Describe level and action of any aggressive behavior or speech and associated interventions.     Anxiety: Excess Worry and Easily fatigued  Depression: difficulty concentrating  Pain  0  AVH   no  S/I   no  Plan  no  H/I   no  Plan  no    Affect   mood-congruent      Note: Patient seen in room for assessment. Oriented x4. Patient denies SI/HI/AVH. Took scheduled AM meds with sips of water, per order. Patient has been NPO and has ketamine infusion scheduled this AM. Patient states she did not sleep well last night. She states that she feels better and that her mood has improved. Midline dressing changed.       Intervention    PRN medication utilized:  no    Instructed in medication usage and effects  Medications administered as ordered  Encouraged to verbalize needs      Response    Verbalized understanding   Did patient take medications as ordered yes   Did patient interact with assessment?  yes     Plan    Will monitor for safety  Will monitor every 15 minutes as ordered  Will evaluate and promote the plan of care    Last BM:  unknown date  (Please chart in I/O as well)

## 2023-09-03 NOTE — PROGRESS NOTES
9/3/2023    Chief Complaint: suicidal ideation and depression    Subjective:  Patient is a 53 y.o. female that is currently inpt on the adult BHU Today she is seen in the [facility] area. She is waiting to get ketamine infusion this morning.  Pt reports she does feel much better. She is hopeful for the future, she is worried about her dog, she states he is missing. She has friends looking for him.  She states that he is her emotional support animal.  Pt is compliant with medications. She is able to make needs known.  She denies SI/HI/AVH    She reports she feels more awake not without Restoril     Objective     Vital Signs    Temp:  [97.7 °F (36.5 °C)-98.1 °F (36.7 °C)] 98.1 °F (36.7 °C)  Heart Rate:  [60-74] 74  Resp:  [18] 18  BP: (116-121)/(61-64) 116/64    Physical Exam:   General Appearance: alert, appears stated age, and cooperative,  Hygiene:   good  Gait & Station: Normal  Musculoskeletal: No tremors or abnormal involuntary movements    Mental Status Exam:   Cooperation:  Cooperative  Eye Contact:  Fair  Psychomotor Behavior:  Appropriate  Mood: Improving  Affect:  mood-congruent  Speech:  Normal  Thought Process:  Coherent  Associations: Goal Directed  Thought Content:     Normal   Suicidal:  None   Homicidal:  None   Hallucinations:  None   Delusion:  None  Cognitive Functioning:   Consciousness: awake, alert, and oriented  Reliability:  fair  Insight:  Fair  Judgement:  Fair  Impulse Control:  Fair    Lab Results (last 24 hours)       ** No results found for the last 24 hours. **          Imaging Results (Last 24 Hours)       ** No results found for the last 24 hours. **            Medicine:   Current Facility-Administered Medications:     acetaminophen (TYLENOL) tablet 650 mg, 650 mg, Oral, Q4H PRN, Curtis Maria II, MD, 650 mg at 08/31/23 2028    aluminum-magnesium hydroxide-simethicone (MAALOX MAX) 400-400-40 MG/5ML suspension 15 mL, 15 mL, Oral, Q6H PRN, Curtis Maria II, MD    amiodarone  (PACERONE) tablet 200 mg, 200 mg, Oral, Daily, Curtis Maria II, MD, 200 mg at 09/03/23 0812    clonazePAM (KlonoPIN) tablet 0.25 mg, 0.25 mg, Oral, Nightly, Marcela Manriquez MD, 0.25 mg at 09/02/23 2030    cloNIDine (CATAPRES) tablet 0.1 mg, 0.1 mg, Oral, Q4H PRN, Curtis Maria II, MD    hydrOXYzine pamoate (VISTARIL) capsule 50 mg, 50 mg, Oral, Q6H PRN, Curtis Maria II, MD, 50 mg at 09/01/23 2026    ketamine (KETALAR) 32 mg in sodium chloride 0.9 % 100 mL infusion, 0.4 mg/kg, Intravenous, Once, Marcela Manriquez MD    loperamide (IMODIUM) capsule 2 mg, 2 mg, Oral, Q2H PRN, Curtis Maria II, MD    Lurasidone HCl (LATUDA) tablet 60 mg, 60 mg, Oral, Daily With Dinner, Curtis Maria II, MD, 60 mg at 09/02/23 1721    magnesium hydroxide (MILK OF MAGNESIA) suspension 10 mL, 10 mL, Oral, Daily PRN, Curtis Maria II, MD, 10 mL at 09/02/23 0834    ondansetron ODT (ZOFRAN-ODT) disintegrating tablet 4 mg, 4 mg, Oral, Q6H PRN, Curtis Maria II, MD    pantoprazole (PROTONIX) EC tablet 40 mg, 40 mg, Oral, Daily, Curtis Maria II, MD, 40 mg at 09/03/23 0812    Pharmacy to dose warfarin, , Does not apply, Continuous PRN, Omaira Willoughby APRN    prazosin (MINIPRESS) capsule 2 mg, 2 mg, Oral, Nightly, Curtis Maria II, MD, 2 mg at 09/02/23 2030    pregabalin (LYRICA) capsule 25 mg, 25 mg, Oral, BID, Curtis Maria II, MD, 25 mg at 09/03/23 0811    venlafaxine XR (EFFEXOR-XR) 24 hr capsule 150 mg, 150 mg, Oral, Daily With Breakfast, Marcela Manriquez MD, 150 mg at 09/03/23 0811    warfarin (COUMADIN) tablet 2 mg, 2 mg, Oral, Daily, Marcela Manriquez MD, 2 mg at 09/02/23 1721    Diagnoses/Assessment:     Suicidal ideation    Bipolar I disorder, most recent episode depressed, severe without psychotic features    Bereavement    Post traumatic stress disorder (PTSD)    Problems of guilt    Nihilism    Chronic atrial fibrillation    GERD  (gastroesophageal reflux disease)    Ineffective coping      Treatment Plan:    1) Will continue care for the patient on the behavioral health unit at [facility] to ensure patient safety.  2) Will continue to provide treatment with the unit milieu, activities, therapies and psychopharmacological management.  3) Patient to be placed on or continued on  Q15 minute checks  and Suicide precautions.  4) Pertinent medical issues:   -Atrial fibrillation, chronic:   --Continue home amiodarone and Coumadin, pharmacy to dose. Paced.  --GERD:   --Continue PPI.  --ANANDA resolved  --Chronic pain/neuorapthy: cont Lyrica 25mg BID  5) Will order following labs: none  6) Will make the following medication changes:   --Cont Latuda 60mg qDinner   --Cont Prazosin 2 mg qhs  --Stop Restoril  --Cont Klonopin 0.25mg qhs and consider increase to bid for anxiety as necessary.  --Cont Effexor XR 150mg daily with breakfast   --Restart Ketamine infusion tomorrow at 0.35mg/kg.              --First infusion, 7/26/23 @ 0.4mg/kg over 45 min; Pre MADRS 51  --Second, 7/28/23 @ 0.45 mg/kg over 45 min; Pre MADRS 38  --Third, 9/1/23 @ 0.35mg/kg at 150ml/hr  --Plan for fourth on 9/3/23 at 0.4mg/kg.  7) Will continue discharge planning as appropriate for patient.  8) Psychotherapy provided for less than 16 minutes.    Treatment plan and medication risks and benefits discussed with: Patient    DESIREE Voss  09/03/23  10:24 CDT

## 2023-09-03 NOTE — NURSING NOTE
"Behavior   Note any precipitants to event or behavior   Describe level and action of any aggressive behavior or speech and associated interventions.     Anxiety: Excess Worry  Depression: depressed mood  Pain  0  AVH   no  S/I   no  Plan  no  H/I   no  Plan  no    Affect   blunted      Note: Pt is seen in dining area for assessment. She is alert and cooperative. She interacts with staff and peers appropriately. She reports anxiety of 7/10, depression of 6/10. She stated \"It's getting a lot better.\" She denies SI, HI, AVH, pain.       Intervention    PRN medication utilized:  no    Instructed in medication usage and effects  Medications administered as ordered  Encouraged to verbalize needs      Response    Verbalized understanding   Did patient take medications as ordered yes   Did patient interact with assessment?  yes     Plan    Will monitor for safety  Will monitor every 15 minutes as ordered  Will evaluate and promote the plan of care    Last BM:  unknown date  (Please chart in I/O as well)    "

## 2023-09-03 NOTE — PROGRESS NOTES
"Anticoagulation by Pharmacy - Warfarin    Wing Hickman is a 53 y.o.female  [Ht: 157.5 cm (62.01\"); Wt: 80.7 kg (178 lb)] on Warfarin for indication of Atrial fibrillation.    Goal INR: 2-3  Home dose is Warfarin 6 mg MWFSun and 3 mg TThSat (Average daily warfarin each week: 5 mg/day)   INR levels are ordered every other day    Today's INR:   Lab Results   Component Value Date    INR 2.63 (H) 09/02/2023          Lab Results   Component Value Date    INR 2.63 (H) 09/02/2023    INR 3.05 (H) 08/31/2023    INR 2.77 (H) 08/29/2023    PROTIME 27.5 (H) 09/02/2023    PROTIME 30.7 (H) 08/31/2023    PROTIME 28.5 (H) 08/29/2023     Lab Results   Component Value Date    HGB 13.7 08/26/2023    HGB 12.6 08/23/2023    HGB 13.0 08/22/2023     Lab Results   Component Value Date    HCT 41.6 08/26/2023    HCT 39.1 08/23/2023    HCT 40.1 08/22/2023     Lab Results   Component Value Date     08/26/2023     08/23/2023     (L) 08/22/2023       Recent Warfarin Administrations       The 5 most recent administrations since 08/27/2023 are shown below each listed medication.    Warfarin Sodium         Order Dose Date Given     warfarin (COUMADIN) tablet 2 mg 2 mg 09/02/2023     warfarin (COUMADIN) tablet 2 mg 2 mg 08/31/2023     warfarin (COUMADIN) tablet 3 mg 3 mg 08/30/2023      3 mg 08/29/2023      3 mg 08/28/2023                    Assessment/Plan:  No INR level today to follow trends. No change in current therapy.    Kym Dominique, PharmD  09/03/23 14:49 CDT     "

## 2023-09-03 NOTE — PLAN OF CARE
Goal Outcome Evaluation:  Plan of Care Reviewed With: patient  Patient Agreement with Plan of Care: agrees     Progress: improving  Outcome Evaluation: Patient had ketamine infusion. Participated in group. Ate meals. Med compliant. Seen by hospitalist for edema lasix ordered.

## 2023-09-03 NOTE — PROGRESS NOTES
[facility] Medicine Admission        Date of Admission: 8/23/2023        Primary Care Physician: Leanne Sarmiento APRN        Chief Complaint: No complaints     HPI: This is a 53-year-old female with past medical history of depression, chronic atrial fibrillation, aortic valve replacement, DM 2, HTN and hypothyroidism that presented to the behavioral health unit on 8/23/2023 after receiving treatment for dehydration and acute kidney injury.     Patient was complaining of lower extremity edema, she states that it is improved because she has been laying in bed for the last 24 hours but has not been on her home dose of Lasix for several days now.     Concurrent Medical History:  has a past medical history of Anemia, Arthritis, Asthma, CHF (congestive heart failure), Depression, Diabetes mellitus, Disease of thyroid gland, History of transfusion, Hypertension, Injury of back, Kidney stone, MDRO (multiple drug resistant organisms) resistance, Migraine, and Seizures.     Past Surgical History:  has a past surgical history that includes Aortic valve replacement (1995); Tricuspid valve surgery (2014); Hysterectomy; Gastric bypass (2002); Cardiac catheterization; and Breast biopsy (Right).     Family History: family history is not on file.     Social History:  reports that she has never smoked. She has never used smokeless tobacco. She reports that she does not currently use alcohol. She reports that she does not currently use drugs.     Allergies:         Allergies   Allergen Reactions    Ace Inhibitors Other (See Comments)       Throat and Lips swelling.     Capsaicin Hives    Nsaids Unknown - Low Severity       Due to having heart surgery and pacemaker    Quetiapine Unknown - Low Severity         Medications:           Prior to Admission medications    Medication Sig Start Date End Date Taking? Authorizing Provider   amiodarone (PACERONE) 200 MG tablet Take 1 tablet by mouth Daily. 6/29/23 6/29/24 Yes Provider,  MD Rosalie   clonazePAM (KlonoPIN) 1 MG tablet Take 1 tablet by mouth 2 (Two) Times a Day As Needed.     Yes Provider, MD Rosalie   Lurasidone HCl (LATUDA) 20 MG tablet tablet Take 1 tablet by mouth Daily With Dinner. 8/23/23   Yes Chapo Edmonds DO   pantoprazole (PROTONIX) 40 MG EC tablet Take 1 tablet by mouth Daily.     Yes Provider, MD Rosalie   pregabalin (LYRICA) 25 MG capsule Take 1 capsule by mouth 2 (Two) Times a Day.     Yes Provider, MD Rosaile   warfarin (COUMADIN) 6 MG tablet Take 1 tablet by mouth Daily. 3 mg on Tuesday, Thursday, and Saturday     Yes Provider, MD Rosalie         Review of Systems:  Review of Systems   Constitutional:  Negative for activity change and fatigue.   HENT:  Negative for ear pain and sore throat.    Eyes:  Negative for pain and discharge.   Respiratory:  Negative for cough and shortness of breath.    Cardiovascular:  Negative for chest pain and palpitations.   Gastrointestinal:  Negative for abdominal pain and nausea.   Endocrine: Negative for cold intolerance and heat intolerance.   Genitourinary:  Negative for difficulty urinating and dysuria.   Musculoskeletal:  Negative for arthralgias and gait problem.   Skin:  Negative for color change and rash.   Neurological:  Negative for dizziness and weakness.   Psychiatric/Behavioral:  Positive for suicidal ideas. Negative for agitation and confusion.     Otherwise complete ROS is negative except as mentioned above.     Physical Exam:   Temp:  [98.1 °F (36.7 °C)] 98.1 °F (36.7 °C)  Heart Rate:  [95] 95  Resp:  [18] 18  BP: (151)/(74) 151/74  Physical Exam  Constitutional:       Appearance: She is well-developed.   HENT:      Head: Normocephalic and atraumatic.   Eyes:      Pupils: Pupils are equal, round, and reactive to light.   Cardiovascular:      Rate and Rhythm: Normal rate and regular rhythm.   Pulmonary:      Effort: Pulmonary effort is normal.      Breath sounds: Normal breath sounds.    Abdominal:      General: Bowel sounds are normal.      Palpations: Abdomen is soft.   Musculoskeletal:         General: Normal range of motion.      Cervical back: Normal range of motion and neck supple.   Skin:     General: Skin is warm and dry.   Neurological:      Mental Status: She is alert and oriented to person, place, and time.      CN I: Sense of smell intact  CN II: Visual fields intact  CN III,IV,VI: extraocular movements intact  CN V: Masseter strength and sensation in all three divisions intact  CN VII: Smile and eyelid closure symmetrical  CN VIII: Hearing intact  CN IX and X: Voice and palate movement intact  CN XI: Shoulder shrug intact  CN XII: Tongue protrusion and movement intact        Results Reviewed:  I have personally reviewed current lab, radiology, and data and agree with results.  Lab Results (last 24 hours)         ** No results found for the last 24 hours. **             Imaging Results (Last 24 Hours)         ** No results found for the last 24 hours. **                   Assessment:          Active Hospital Problems     Diagnosis      **Suicidal ideation              Plan:  Suicidal ideation: Continue therapy with psychiatry.  Atrial fibrillation, chronic: Continue home amiodarone and Coumadin, pharmacy to dose.  GERD: Continue PPI.  Swelling: Patient recently admitted for ANANDA, was taken off of her home Lasix.  Normally takes 20 mg daily.  Will restart 10 mg.  No significant lower extremity edema appreciated, patient states that it has been better because she has been laying for the last 24 hours.

## 2023-09-04 LAB
HOLD SPECIMEN: NORMAL
INR PPP: 1.49 (ref 0.8–1.2)
PROTHROMBIN TIME: 17.9 SECONDS (ref 11.1–15.3)
WHOLE BLOOD HOLD SPECIMEN: NORMAL

## 2023-09-04 PROCEDURE — 99232 SBSQ HOSP IP/OBS MODERATE 35: CPT | Performed by: PSYCHIATRY & NEUROLOGY

## 2023-09-04 PROCEDURE — 85610 PROTHROMBIN TIME: CPT | Performed by: NURSE PRACTITIONER

## 2023-09-04 RX ORDER — KETAMINE HYDROCHLORIDE 10 MG/ML
0.4 INJECTION INTRAMUSCULAR; INTRAVENOUS ONCE
Status: DISCONTINUED | OUTPATIENT
Start: 2023-09-05 | End: 2023-09-04

## 2023-09-04 RX ORDER — POLYETHYLENE GLYCOL 3350 17 G/17G
17 POWDER, FOR SOLUTION ORAL ONCE
Status: COMPLETED | OUTPATIENT
Start: 2023-09-04 | End: 2023-09-04

## 2023-09-04 RX ORDER — WARFARIN SODIUM 3 MG/1
3 TABLET ORAL
Status: DISCONTINUED | OUTPATIENT
Start: 2023-09-04 | End: 2023-09-06

## 2023-09-04 RX ADMIN — VENLAFAXINE HYDROCHLORIDE 150 MG: 75 CAPSULE, EXTENDED RELEASE ORAL at 08:08

## 2023-09-04 RX ADMIN — CLONAZEPAM 0.25 MG: 0.5 TABLET ORAL at 20:36

## 2023-09-04 RX ADMIN — PANTOPRAZOLE SODIUM 40 MG: 40 TABLET, DELAYED RELEASE ORAL at 08:08

## 2023-09-04 RX ADMIN — MAGNESIUM HYDROXIDE 10 ML: 2400 SUSPENSION ORAL at 08:12

## 2023-09-04 RX ADMIN — PRAZOSIN HYDROCHLORIDE 2 MG: 1 CAPSULE ORAL at 20:36

## 2023-09-04 RX ADMIN — POLYETHYLENE GLYCOL 3350 17 G: 17 POWDER, FOR SOLUTION ORAL at 17:31

## 2023-09-04 RX ADMIN — WARFARIN SODIUM 3 MG: 3 TABLET ORAL at 17:34

## 2023-09-04 RX ADMIN — PREGABALIN 25 MG: 25 CAPSULE ORAL at 08:08

## 2023-09-04 RX ADMIN — PREGABALIN 25 MG: 25 CAPSULE ORAL at 20:37

## 2023-09-04 RX ADMIN — AMIODARONE HYDROCHLORIDE 200 MG: 200 TABLET ORAL at 08:08

## 2023-09-04 RX ADMIN — Medication 10 MG: at 08:08

## 2023-09-04 RX ADMIN — LURASIDONE HYDROCHLORIDE 60 MG: 40 TABLET, FILM COATED ORAL at 17:30

## 2023-09-04 NOTE — PLAN OF CARE
Goal Outcome Evaluation:  Plan of Care Reviewed With: patient  Patient Agreement with Plan of Care: agrees     Progress: improving  Outcome Evaluation: Patient complained of panic attacks earlier this shift but has not had any further complaints after routine medication.  Denies depression has slept 7.5 hours at this time

## 2023-09-04 NOTE — NURSING NOTE
Behavior   Note any precipitants to event or behavior   Describe level and action of any aggressive behavior or speech and associated interventions.     Anxiety: Excess Worry  Depression: depressed mood  Pain  0  AVH   no  S/I   no  Plan  no  H/I   no  Plan  no    Affect   euthymic/normal       Note: Patient continues to endorse mild depression. Yet, patient complains of significant anxiety. She claims some patients are making her feel anxious and agitated. She even claims she had a panic attack last night d/t having bad thoughts/memories that she could not get out of her mind. However, she denies SI/HI/AVH. Patient received prn milk of magnesia for reports of constipation.       Intervention    PRN medication utilized:  yes-milk of magnesia    Instructed in medication usage and effects  Medications administered as ordered  Encouraged to verbalize needs      Response    Verbalized understanding   Did patient take medications as ordered yes   Did patient interact with assessment?  yes     Plan    Will monitor for safety  Will monitor every 15 minutes as ordered  Will evaluate and promote the plan of care    Last BM:  unknown date  (Please chart in I/O as well)

## 2023-09-04 NOTE — PROGRESS NOTES
"Anticoagulation by Pharmacy - Warfarin    Wing Hickman is a 53 y.o.female who has been consulted for warfarin for atrial fibrillation.     Home regimen: Warfarin 6 mg PO MWFSun and 3 mg TTSat  INR Goal: 2-3    Objective:  [Ht: 157.5 cm (62.01\"); Wt: 80.7 kg (178 lb)]    Lab Results   Component Value Date    INR 1.49 (H) 09/04/2023    INR 2.63 (H) 09/02/2023    INR 3.05 (H) 08/31/2023    PROTIME 17.9 (H) 09/04/2023    PROTIME 27.5 (H) 09/02/2023    PROTIME 30.7 (H) 08/31/2023     Lab Results   Component Value Date    HGB 13.7 08/26/2023    HGB 12.6 08/23/2023    HGB 13.0 08/22/2023    HCT 41.6 08/26/2023    HCT 39.1 08/23/2023    HCT 40.1 08/22/2023     08/26/2023     08/23/2023     (L) 08/22/2023       Recent Warfarin Administrations       The 5 most recent administrations since 08/19/2023 are shown below each listed medication.    Warfarin Sodium         Order Dose Date Given     warfarin (COUMADIN) tablet 3 mg 3 mg 08/25/2023     warfarin (COUMADIN) tablet 6 mg 6 mg 08/21/2023                    Assessment  H/H/plts WNL on 8/26. No signs or symptoms of bleeding noted.   Interacting medications: amiodarone, venlafaxine   INR is subtherapeutic. Dose held on 9/1 and regimen restarted 9/2. Increase dose for tonight.     Plan:  Give warfarin 3 mg PO @ 1800 tonight   PT/INR ordered every other day.   Pharmacy will continue to follow    Thank you for this consult.     Ciro Medley [facility]  09/04/23 14:32 CDT     "

## 2023-09-04 NOTE — PLAN OF CARE
Problem: Adult Behavioral Health Plan of Care  Goal: Plan of Care Review  Outcome: Ongoing, Progressing  Flowsheets  Taken 9/4/2023 1412  Progress: improving  Plan of Care Reviewed With: patient  Patient Agreement with Plan of Care: agrees  Outcome Evaluation: Pt able to voice coping skill she would use. Pt denies SI\HI\AVH at this time  Taken 9/1/2023 1403  Consent Given to Review Plan with: refused  Goal: Patient-Specific Goal (Individualization)  Outcome: Ongoing, Progressing  Flowsheets  Taken 8/30/2023 1249  Anxieties, Fears or Concerns: denies  Taken 8/25/2023 1605  Patient Personal Strengths:   ability to maintain sobriety   resilient   stable living environment  Patient-Specific Goals (Include Timeframe): pt to have no SI\HI\AVH at time of d\c and to verbalize 1-3 learned coping skills  Individualized Care Needs: group, education, safety planning, aftecare, family session  Patient Vulnerabilities:   adverse childhood experience(s)   family/relationship conflict   lacks insight into illness   poor physical health   poor impulse control   recent loss   limited social skills   limited support system   occupational insecurity   housing insecurity   history of unsuccessful treatment  Goal: Optimized Coping Skills in Response to Life Stressors  Outcome: Ongoing, Progressing  Flowsheets (Taken 9/4/2023 1412)  Optimized Coping Skills in Response to Life Stressors: making progress toward outcome  Intervention: Promote Effective Coping Strategies  Flowsheets (Taken 9/4/2023 1412)  Supportive Measures:   active listening utilized   verbalization of feelings encouraged  Goal: Develops/Participates in Therapeutic [place] to Support Successful Transition  Outcome: Ongoing, Progressing  Flowsheets (Taken 9/4/2023 1412)  Develops/Participates in Therapeutic [place] to Support Successful Transition: making progress toward outcome  Intervention: Foster Therapeutic [place]  Flowsheets (Taken 9/4/2023 1412)  Trust  Relationship/Rapport:   care explained   choices provided   reassurance provided   thoughts/feelings acknowledged   emotional support provided   empathic listening provided   questions answered   questions encouraged  Intervention: Mutually Develop Transition Plan  Flowsheets  Taken 9/4/2023 1412  Transition Support: follow-up care discussed  Offered/Gave Vendor List: no  Taken 8/30/2023 1249  Patient/Family Anticipates Transition to: home  Taken 8/28/2023 1351  Anticipated Discharge Disposition: home or self-care  Readmission Within the Last 30 Days: previous discharge plan unsuccessful  Taken 8/25/2023 1605  Outpatient/Agency/Support Group Needs:   case management   intensive outpatient services   outpatient medication management   outpatient counseling  Discharge Coordination/Progress: [facility] or [facility]  Transportation Anticipated: public transportation  Transportation Concerns: no car  Current Discharge Risk:   psychiatric illness   lack of support system/caregiver  Concerns to be Addressed:   patient refuses services   adjustment to diagnosis/illness   basic needs   decision-making   coping/stress   compliance issue   cognitive/perceptual   care coordination/care conferences   discharge planning   grief and loss   relationship   mental health   medication   suicidal  Patient/Family Anticipated Services at Transition:      mental health services   outpatient care  Patient's Choice of Community Agency(s): [facility] or [facility]     Problem: Suicide Risk  Goal: Absence of Self-Harm  Intervention: Promote Psychosocial Wellbeing  Recent Flowsheet Documentation  Taken 9/4/2023 1412 by Katelyn Wolfe MSW  Supportive Measures:   active listening utilized   verbalization of feelings encouraged     Goal Outcome Evaluation:  Plan of Care Reviewed With: patient  Patient Agreement with Plan of Care: agrees  Consent Given to Review Plan with: refused  Progress:  improving  Outcome Evaluation: Pt able to voice coping skill she would use. Pt denies SI\HI\AVH at this time

## 2023-09-04 NOTE — PLAN OF CARE
Goal Outcome Evaluation:  Plan of Care Reviewed With: patient  Patient Agreement with Plan of Care: agrees     Progress: improving  Outcome Evaluation: No new issues or concerns noted this shift.

## 2023-09-04 NOTE — PROGRESS NOTES
"[place] this date. Pt states \"I feel much better and I hope to go home on Friday.\" Pt states she received an infusion over the weekend. Pt states \"I'm a little better but still feel a little anxious.\" Pt was able to voice using coping skills when feeling the anxiety come on. Pt reports her car is at home and voiced concern for d\c and how she would get home. Pt denies having any family to help. Pt encouraged to attend all groups.   "

## 2023-09-04 NOTE — PROGRESS NOTES
"9/4/2023    Chief Complaint: suicidal ideation and depression    Subjective:  Patient is a 53 y.o. female that is currently inpt on the adult U today she is seen in the [facility] area. Pt states that she is doing \"ok\"  she is coloring.  She does state she rather sit alone because being around some people makes her anxious.    Pt states that last night she had a panic attack due to memories, but then later was able to rest.   Pt denies SI/HI/AVH  Reports that medications are tolerated.  She reports constipation which is concerning to her due to having dumping syndrome for so long after gastric bypass.  We discussed diet and sitting majority of the day could be factors and to utilize medications and also walk around more during the day.     Objective     Vital Signs    Temp:  [97.9 °F (36.6 °C)-98.4 °F (36.9 °C)] 98.1 °F (36.7 °C)  Heart Rate:  [59-76] 61  Resp:  [18-20] 18  BP: (117-135)/(64-82) 117/64    Physical Exam:   General Appearance: alert, appears stated age, and cooperative,  Hygiene:   fair  Gait & Station: Normal  Musculoskeletal: No tremors or abnormal involuntary movements    Mental Status Exam:   Cooperation:  Cooperative  Eye Contact:  Fair  Psychomotor Behavior:  Appropriate  Mood: Improving  Affect:  mood-congruent  Speech:  Normal  Thought Process:  Coherent  Associations: Goal Directed  Thought Content:     Mood congruent   Suicidal:  None   Homicidal:  None   Hallucinations:  None   Delusion:  None  Cognitive Functioning:   Consciousness: awake and alert  Reliability:  fair  Insight:  Fair  Judgement:  Fair  Impulse Control:  Fair    Lab Results (last 24 hours)       Procedure Component Value Units Date/Time    Extra Tubes [[id number]] Collected: 09/04/23 0623    Specimen: Blood, Venous Line Updated: 09/04/23 0731    Narrative:      The following orders were created for panel order Extra Tubes.  Procedure                               Abnormality         Status                     ---------         "                       -----------         ------                     Lavender Top[[id number]]                                     Final result               Green Top (Gel)[[id number]]                                  Final result                 Please view results for these tests on the individual orders.    Green Top (Gel) [[id number]] Collected: 09/04/23 0623    Specimen: Blood Updated: 09/04/23 0731     Extra Tube Hold for add-ons.     Comment: Auto resulted.       Lavender Top [[id number]] Collected: 09/04/23 0623    Specimen: Blood Updated: 09/04/23 0730     Extra Tube hold for add-on     Comment: Auto resulted       Protime-INR [[id number]]  (Abnormal) Collected: 09/04/23 0623    Specimen: Blood Updated: 09/04/23 0706     Protime 17.9 Seconds      INR 1.49    Narrative:      Therapeutic range for most indications is 2.0-3.0 INR,  or 2.5-3.5 for mechanical heart valves.          Imaging Results (Last 24 Hours)       ** No results found for the last 24 hours. **            Medicine:   Current Facility-Administered Medications:     acetaminophen (TYLENOL) tablet 650 mg, 650 mg, Oral, Q4H PRN, Curtis Maria II, MD, 650 mg at 08/31/23 2028    aluminum-magnesium hydroxide-simethicone (MAALOX MAX) 400-400-40 MG/5ML suspension 15 mL, 15 mL, Oral, Q6H PRN, Curtis Maria II, MD    amiodarone (PACERONE) tablet 200 mg, 200 mg, Oral, Daily, Curtis Maria II, MD, 200 mg at 09/04/23 0808    clonazePAM (KlonoPIN) tablet 0.25 mg, 0.25 mg, Oral, Nightly, Marcela Manriquez MD, 0.25 mg at 09/03/23 2046    cloNIDine (CATAPRES) tablet 0.1 mg, 0.1 mg, Oral, Q4H PRN, Curtis Maria II, MD    furosemide (LASIX) half tablet 10 mg, 10 mg, Oral, Daily, Lillie Lopez PA-C, 10 mg at 09/04/23 0808    hydrOXYzine pamoate (VISTARIL) capsule 50 mg, 50 mg, Oral, Q6H PRN, Curtis Maria II, MD, 50 mg at 09/01/23 2026    loperamide (IMODIUM) capsule 2 mg, 2 mg, Oral, Q2H PRN, Curtis Maria II, MD     Lurasidone HCl (LATUDA) tablet 60 mg, 60 mg, Oral, Daily With Dinner, Curtis Maria II, MD, 60 mg at 09/03/23 1716    magnesium hydroxide (MILK OF MAGNESIA) suspension 10 mL, 10 mL, Oral, Daily PRN, Curtis Maria II, MD, 10 mL at 09/04/23 0812    ondansetron ODT (ZOFRAN-ODT) disintegrating tablet 4 mg, 4 mg, Oral, Q6H PRN, Curtis Maria II, MD    pantoprazole (PROTONIX) EC tablet 40 mg, 40 mg, Oral, Daily, Curtis Maria II, MD, 40 mg at 09/04/23 0808    Pharmacy to dose warfarin, , Does not apply, Continuous PRN, Case, Omaira G, APRVICKY    prazosin (MINIPRESS) capsule 2 mg, 2 mg, Oral, Nightly, Curtis Maria II, MD, 2 mg at 09/03/23 2046    pregabalin (LYRICA) capsule 25 mg, 25 mg, Oral, BID, Curtis Maria II, MD, 25 mg at 09/04/23 0808    venlafaxine XR (EFFEXOR-XR) 24 hr capsule 150 mg, 150 mg, Oral, Daily With Breakfast, Marcela Manriquez MD, 150 mg at 09/04/23 0808    warfarin (COUMADIN) tablet 2 mg, 2 mg, Oral, Daily, Marcela Manriquez MD, 2 mg at 09/03/23 1716    Diagnoses/Assessment:     Suicidal ideation    Bipolar I disorder, most recent episode depressed, severe without psychotic features    Bereavement    Post traumatic stress disorder (PTSD)    Problems of guilt    Nihilism    Chronic atrial fibrillation    GERD (gastroesophageal reflux disease)    Ineffective coping      Treatment Plan:    1) Will continue care for the patient on the behavioral health unit at [facility] to ensure patient safety.  2) Will continue to provide treatment with the unit milieu, activities, therapies and psychopharmacological management.  3) Patient to be placed on or continued on  Q15 minute checks  and Suicide precautions.  4) Pertinent medical issues:   -Atrial fibrillation, chronic:   --Continue home amiodarone and Coumadin, pharmacy to dose. Paced.  --GERD:   --Continue PPI.  --ANANDA resolved  --Chronic pain/neuorapthy: cont Lyrica 25mg BID  5) Will order  following labs: none  6) Will make the following medication changes:   Bipolar d/o, PTSD, guilt, grief, SI  --Cont Latuda 60mg qDinner   --Cont Prazosin 2 mg qhs  --Stopped Restoril  --Cont Klonopin 0.25mg qhs and consider increase as appropriate  --Cont Effexor XR 150mg daily with breakfast   --Ketamine infusions:              --First infusion, 7/26/23 @ 0.4mg/kg over 45 min; Pre MADRS 51  --Second, 7/28/23 @ 0.45 mg/kg over 45 min; Pre MADRS 38  --Third, 9/1/23 @ 0.35mg/kg at 150ml/hr (about 45 min)  --Fourth, 9/3/23 @ 0.4mg/kg.at 150ml/hr (about 45 min)     7) Will continue discharge planning as appropriate for patient.  8) Psychotherapy provided for less than 16 minutes.    Treatment plan and medication risks and benefits discussed with: Patient    DESIREE Voss  09/04/23  11:17 CDT

## 2023-09-04 NOTE — NURSING NOTE
Behavior   Note any precipitants to event or behavior   Describe level and action of any aggressive behavior or speech and associated interventions.     Anxiety: Excess Worry and Restless/Edgy  Depression: difficulty concentrating  Pain  0  AVH   no  S/I   no  Plan  no  H/I   no  Plan  no    Affect   mood-congruent      Note:Patient in lobby for assessment and medications.  Alert and oriented x 4.  Denies SI/HI/AVH.  States anxiety of 10 states has been having some panic attacks tonight.  Denies depression.  Will continue to monitor      Intervention    PRN medication utilized:  no    Instructed in medication usage and effects  Medications administered as ordered  Encouraged to verbalize needs      Response    Verbalized understanding   Did patient take medications as ordered yes   Did patient interact with assessment?  yes     Plan    Will monitor for safety  Will monitor every 15 minutes as ordered  Will evaluate and promote the plan of care    Last BM:  unknown date  (Please chart in I/O as well)

## 2023-09-05 LAB
ANION GAP SERPL CALCULATED.3IONS-SCNC: 7 MMOL/L (ref 5–15)
BUN SERPL-MCNC: 19 MG/DL (ref 6–20)
BUN/CREAT SERPL: 17.6 (ref 7–25)
CALCIUM SPEC-SCNC: 8.7 MG/DL (ref 8.6–10.5)
CHLORIDE SERPL-SCNC: 109 MMOL/L (ref 98–107)
CO2 SERPL-SCNC: 25 MMOL/L (ref 22–29)
CREAT SERPL-MCNC: 1.08 MG/DL (ref 0.57–1)
EGFRCR SERPLBLD CKD-EPI 2021: 62.3 ML/MIN/1.73
GLUCOSE SERPL-MCNC: 75 MG/DL (ref 65–99)
POTASSIUM SERPL-SCNC: 4.2 MMOL/L (ref 3.5–5.2)
SODIUM SERPL-SCNC: 141 MMOL/L (ref 136–145)

## 2023-09-05 PROCEDURE — 80048 BASIC METABOLIC PNL TOTAL CA: CPT | Performed by: PSYCHIATRY & NEUROLOGY

## 2023-09-05 PROCEDURE — 99232 SBSQ HOSP IP/OBS MODERATE 35: CPT | Performed by: PSYCHIATRY & NEUROLOGY

## 2023-09-05 RX ORDER — SODIUM CHLORIDE 9 MG/ML
INJECTION, SOLUTION INTRAVENOUS
Status: COMPLETED
Start: 2023-09-05 | End: 2023-09-05

## 2023-09-05 RX ADMIN — SODIUM CHLORIDE 32 MG: 9 INJECTION, SOLUTION INTRAVENOUS at 10:52

## 2023-09-05 RX ADMIN — ACETAMINOPHEN 650 MG: 325 TABLET, FILM COATED ORAL at 03:51

## 2023-09-05 RX ADMIN — PREGABALIN 25 MG: 25 CAPSULE ORAL at 20:12

## 2023-09-05 RX ADMIN — WARFARIN SODIUM 3 MG: 3 TABLET ORAL at 17:17

## 2023-09-05 RX ADMIN — PRAZOSIN HYDROCHLORIDE 2 MG: 1 CAPSULE ORAL at 20:12

## 2023-09-05 RX ADMIN — MAGNESIUM HYDROXIDE 10 ML: 2400 SUSPENSION ORAL at 14:02

## 2023-09-05 RX ADMIN — AMIODARONE HYDROCHLORIDE 200 MG: 200 TABLET ORAL at 08:08

## 2023-09-05 RX ADMIN — CLONAZEPAM 0.25 MG: 0.5 TABLET ORAL at 20:12

## 2023-09-05 RX ADMIN — LURASIDONE HYDROCHLORIDE 60 MG: 40 TABLET, FILM COATED ORAL at 17:17

## 2023-09-05 RX ADMIN — VENLAFAXINE HYDROCHLORIDE 150 MG: 75 CAPSULE, EXTENDED RELEASE ORAL at 08:08

## 2023-09-05 RX ADMIN — Medication 10 MG: at 08:08

## 2023-09-05 RX ADMIN — SODIUM CHLORIDE 1000 ML: 9 INJECTION, SOLUTION INTRAVENOUS at 10:52

## 2023-09-05 RX ADMIN — PREGABALIN 25 MG: 25 CAPSULE ORAL at 08:08

## 2023-09-05 RX ADMIN — PANTOPRAZOLE SODIUM 40 MG: 40 TABLET, DELAYED RELEASE ORAL at 08:08

## 2023-09-05 NOTE — PROGRESS NOTES
"Met with pt this date. Pt states she is feeling better and \"more hopeful.\" Pt reports getting infusion this date, with hope to go home on Friday. Pt denies SI/HI/AVH at this time. Pt encouraged to attend groups this date.   "

## 2023-09-05 NOTE — PROGRESS NOTES
"Anticoagulation by Pharmacy - Warfarin    Wing Hickman is a 53 y.o.female who has been consulted for warfarin for atrial fibrillation      Home regimen: Warfarin 6 mg PO MWFSun and 3 mg TThSat   INR Goal: 2-3    Objective:  [Ht: 157.5 cm (62.01\"); Wt: 80.7 kg (178 lb)]    Lab Results   Component Value Date    INR 1.49 (H) 09/04/2023    INR 2.63 (H) 09/02/2023    INR 3.05 (H) 08/31/2023    PROTIME 17.9 (H) 09/04/2023    PROTIME 27.5 (H) 09/02/2023    PROTIME 30.7 (H) 08/31/2023     Lab Results   Component Value Date    HGB 13.7 08/26/2023    HGB 12.6 08/23/2023    HGB 13.0 08/22/2023    HCT 41.6 08/26/2023    HCT 39.1 08/23/2023    HCT 40.1 08/22/2023     08/26/2023     08/23/2023     (L) 08/22/2023       Recent Warfarin Administrations       The 5 most recent administrations since 08/29/2023 are shown below each listed medication.    Warfarin Sodium         Order Dose Date Given     warfarin (COUMADIN) tablet 3 mg 3 mg 09/04/2023     warfarin (COUMADIN) tablet 2 mg 2 mg 09/03/2023      2 mg 09/02/2023     warfarin (COUMADIN) tablet 2 mg 2 mg 08/31/2023     warfarin (COUMADIN) tablet 3 mg 3 mg 08/30/2023                    Assessment  H/H/plts none since 8/26. No signs or symptoms of bleeding noted.   Interacting medications: amiodarone, venlafaxine  INR was subtherapeutic.     Plan:  Give warfarin 3 mg mg PO @ 1800 tonight  PT/INR ordered daily  Pharmacy will continue to follow    Thank you,     Carina Patel, Pharm.D.   9/5/2023  15:45 CDT      "

## 2023-09-05 NOTE — NURSING NOTE
"Behavior   Note any precipitants to event or behavior   Describe level and action of any aggressive behavior or speech and associated interventions.     Anxiety: Excess Worry and Restless/Edgy  Depression: depressed mood  Pain  3  AVH   no  S/I   no  Plan  no  H/I   no  Plan  no    Affect   euthymic/normal      Note:  Patient presents with a euthymic affect and good eye contact.  Patient reports 6/10 anxiety and 4/10 depression.  Patient states that she is anxious \"because I want to go home.\" Patient denies SI, HI, and AVH.  Patient participated in unit activities, but was withdrawn to self with minimal peer interaction.  Patient med compliant and cooperative.  Patient reports 3/10 leg pain and was given scheduled Lyrica.  Patient reports sleeping \"on and off\" and is eating \"better.\"  Patient voices no other concerns at this time.      Intervention    PRN medication utilized:  no    Instructed in medication usage and effects  Medications administered as ordered  Encouraged to verbalize needs      Response    Verbalized understanding   Did patient take medications as ordered yes   Did patient interact with assessment?  yes     Plan    Will monitor for safety  Will monitor every 15 minutes as ordered  Will evaluate and promote the plan of care    Last BM:  unknown date  (Please chart in I/O as well)    "

## 2023-09-05 NOTE — PLAN OF CARE
Goal Outcome Evaluation:  Plan of Care Reviewed With: patient  Patient Agreement with Plan of Care: agrees     Progress: improving  Outcome Evaluation: Patient denies SI this shift, patient reports depression level is improving, patient has been smiling and pleasant, patient has had no falls this shift, patient reports good appetite this shift

## 2023-09-05 NOTE — PLAN OF CARE
Goal Outcome Evaluation:  Plan of Care Reviewed With: patient  Patient Agreement with Plan of Care: agrees     Progress: improving  Outcome Evaluation: Patient med compliant and cooperative with no behavioral issues or concerns.  Patient denies SI, HI, and AVH.  Patient observed getting 6.25 hours of sleep so far this shift.

## 2023-09-05 NOTE — PROGRESS NOTES
Psychiatry Progress Note    9/5/2023    Legal Status: Voluntary    Chief Complaint: suicidal ideation and depression    Subjective:  Patient is a 53 y.o. female who was hospitalized for suicidal ideation and depression.    Patient seen during ketamine infusion. She continues report feeling more hopeful and less depressed.  She notes no suicidal thoughts.    Patient reports feeling anxious about going home after discharge. Patient states she feels less depressed and wants to work on coping skills to utilize when she does transition home. Patient inquired about going home on Friday following her final Ketamine treatment on Thursday.     Patient reports difficulty sleeping due to legs cramping. Patient reports no improvement last night after receiving Tylenol for pain. Patient states movement does not improve symptoms. Patient inquired about a metabolic panel to measure her potassium levels.    Patient had fifth ketamine infusion today without issue.    Objective     Vital Signs    Vitals:    09/03/23 2005 09/04/23 0747 09/04/23 1900 09/05/23 0758   BP: 135/82 117/64 125/67 120/60   BP Location: Left arm Right arm Left arm Right arm   Patient Position: Sitting Lying Sitting Sitting   Pulse: 76 61 60 73   Resp: 18 18 16 20   Temp: 97.9 °F (36.6 °C) 98.1 °F (36.7 °C) 98.1 °F (36.7 °C) 97.5 °F (36.4 °C)   TempSrc: Temporal Temporal Tympanic Temporal   SpO2: 97% 98% 100% 98%   Weight:       Height:           Physical Exam: as of today, 09/05/23   General Appearance: alert and cooperative,  Hygiene:   fair  Gait & Station: Normal  Musculoskeletal: No tremors or abnormal involuntary movements    Mental Status Exam: as of today, 09/05/23   Cooperation:  Cooperative  Eye Contact:  Good  Psychomotor Behavior:  Appropriate  Mood: Improving  Affect:  mood-congruent  Speech:  Monotone  Thought Process:  Coherent  Associations: Goal Directed  Thought Content:     Mood congruent   Suicidal:  None   Homicidal:  None   Hallucinations:   None   Delusion:  None  Cognitive Functioning:   Consciousness: awake, alert, and oriented  Reliability:  fair  Insight:  Fair  Judgement:  Fair  Impulse Control:  Fair    Lab Results: Results source: EMR   Lab Results (last 24 hours)       ** No results found for the last 24 hours. **            Radiology Results:  Imaging Results (Last 24 Hours)       ** No results found for the last 24 hours. **            Medicine:   Current Facility-Administered Medications:     acetaminophen (TYLENOL) tablet 650 mg, 650 mg, Oral, Q4H PRN, Curtis Maria II, MD, 650 mg at 09/05/23 0351    aluminum-magnesium hydroxide-simethicone (MAALOX MAX) 400-400-40 MG/5ML suspension 15 mL, 15 mL, Oral, Q6H PRN, Curtis Maria II, MD    amiodarone (PACERONE) tablet 200 mg, 200 mg, Oral, Daily, Curtis Maria II, MD, 200 mg at 09/05/23 0808    clonazePAM (KlonoPIN) tablet 0.25 mg, 0.25 mg, Oral, Nightly, Marcela Manriquez MD, 0.25 mg at 09/04/23 2036    cloNIDine (CATAPRES) tablet 0.1 mg, 0.1 mg, Oral, Q4H PRN, Curtis Maria II, MD    furosemide (LASIX) half tablet 10 mg, 10 mg, Oral, Daily, Lillie Lopez PA-C, 10 mg at 09/05/23 0808    hydrOXYzine pamoate (VISTARIL) capsule 50 mg, 50 mg, Oral, Q6H PRN, Curtis Maria II, MD, 50 mg at 09/01/23 2026    ketamine (KETALAR) 32 mg in sodium chloride 0.9 % 100 mL infusion, 0.4 mg/kg, Intravenous, Once, Marcela Manriquez MD    loperamide (IMODIUM) capsule 2 mg, 2 mg, Oral, Q2H PRN, Curtis Maria II, MD    Lurasidone HCl (LATUDA) tablet 60 mg, 60 mg, Oral, Daily With Dinner, Curtis Maria II, MD, 60 mg at 09/04/23 1730    magnesium hydroxide (MILK OF MAGNESIA) suspension 10 mL, 10 mL, Oral, Daily PRN, Curtis Maria II, MD, 10 mL at 09/04/23 0812    ondansetron ODT (ZOFRAN-ODT) disintegrating tablet 4 mg, 4 mg, Oral, Q6H PRN, Curtis Maria II, MD    pantoprazole (PROTONIX) EC tablet 40 mg, 40 mg, Oral, Daily, Curtis Maria  Doron GALAN MD, 40 mg at 09/05/23 0808    Pharmacy to dose warfarin, , Does not apply, Continuous PRN, Levill, Omaira G, APRN    prazosin (MINIPRESS) capsule 2 mg, 2 mg, Oral, Nightly, Curtis Maria II, MD, 2 mg at 09/04/23 2036    pregabalin (LYRICA) capsule 25 mg, 25 mg, Oral, BID, Curtis Maria II, MD, 25 mg at 09/05/23 0808    sodium chloride 0.9 % infusion  - ADS Override Pull, , , ,     venlafaxine XR (EFFEXOR-XR) 24 hr capsule 150 mg, 150 mg, Oral, Daily With Breakfast, Marcela Manriquez MD, 150 mg at 09/05/23 0808    warfarin (COUMADIN) tablet 3 mg, 3 mg, Oral, Daily, Marcela Manriquez MD, 3 mg at 09/04/23 1734    Diagnoses/Assessment:     Suicidal ideation    Bipolar I disorder, most recent episode depressed, severe without psychotic features    Bereavement    Post traumatic stress disorder (PTSD)    Problems of guilt    Nihilism    Chronic atrial fibrillation    GERD (gastroesophageal reflux disease)    Ineffective coping      Treatment Plan:    1) Will continue care for the patient on the behavioral health unit at [facility] to ensure patient safety.  2) Will continue to provide treatment with the unit milieu, activities, therapies and psychopharmacological management.  3) Patient to be placed on or continued on  Q15 minute checks  and Suicide precautions.  4) Pertinent medical issues:   -Atrial fibrillation, chronic: Continue home amiodarone and Coumadin, pharmacy to dose. Paced.  -GERD: Continue PPI.  -ANANDA resolved  -Chronic pain/neuorapthy: cont Lyrica 25mg BID  -Edema: Lasix 10mg daily.  Complaint of leg cramps.  Will check BMP to ensure no hypokalemia from the Lasix.  5) Will order following labs: none  6) Will make the following medication changes:   Bipolar d/o, PTSD, guilt, grief, SI  --Cont Latuda 60mg qDinner   --Cont Prazosin 2 mg qhs  --Cont Klonopin 0.25mg qhs and consider increase as appropriate  --Cont Effexor XR 150mg daily with breakfast   --Ketamine  infusions:              --First infusion, 7/26/23 @ 0.4mg/kg over 45 min; Pre MADRS 51  --Second, 7/28/23 @ 0.45 mg/kg over 45 min; Pre MADRS 38  --Third, 9/1/23 @ 0.35mg/kg at 150ml/hr (about 45 min)  --Fourth, 9/3/23 @ 0.4mg/kg.at 150ml/hr (about 45 min)  --Fifth 9/5/23 @ 0.4mg/kg at 150ml/hr (about 45 min)  --Plan for 6th infusion on 9/7/23 @ 0.4mg/kg  7) Will continue discharge planning for patient: outpatient psychiatric care, outpatient medical care, safety planning and placement as appropriate.    Treatment plan and medication risks and benefits discussed with: Patient and treatment team    As the teaching physician, I have personally re-performed the physical exam and medical decision making activities included in the student note.    Marcela Manriquez MD  09/05/23 at 09:05 T

## 2023-09-05 NOTE — NURSING NOTE
Behavior   Note any precipitants to event or behavior   Describe level and action of any aggressive behavior or speech and associated interventions.     Anxiety: Patient denies at this time  Depression: Patient denies at this time  Pain  0  AVH   no  S/I   no  Plan  no  H/I   no  Plan  no    Affect   mood-congruent      Note: Patient seen in adult common area individually. Patient denies SI/HI/AVH. Patient says that she feels good. Patient stated that she was irritated by others this AM but understood that they have their own problems. No needs or concerns voiced at this time.       Intervention    PRN medication utilized:  no    Instructed in medication usage and effects  Medications administered as ordered  Encouraged to verbalize needs      Response    Verbalized understanding   Did patient take medications as ordered yes   Did patient interact with assessment?  yes     Plan    Will monitor for safety  Will monitor every 15 minutes as ordered  Will evaluate and promote the plan of care    Last BM:  unknown date  (Please chart in I/O as well)

## 2023-09-05 NOTE — PROGRESS NOTES
Nutrition Services    Patient Name:  Wing Hickman  YOB: 1970  MRN: [id number]  Admit Date:  8/23/2023    Pt discussed liking the boost.  Boost viki was added TID as pt has been ordering.  Pt has hx of gastric bypass and states she has a hard time meeting her protein needs.     Electronically signed by:  Zofia Ny RD  09/05/23 10:47 CDT

## 2023-09-06 LAB
GLUCOSE P FAST SERPL-MCNC: 83 MG/DL (ref 74–106)
INR PPP: 1.67 (ref 0.8–1.2)
PROTHROMBIN TIME: 19.5 SECONDS (ref 11.1–15.3)

## 2023-09-06 PROCEDURE — 63710000001 ONDANSETRON ODT 4 MG TABLET DISPERSIBLE: Performed by: PSYCHIATRY & NEUROLOGY

## 2023-09-06 PROCEDURE — 85610 PROTHROMBIN TIME: CPT | Performed by: NURSE PRACTITIONER

## 2023-09-06 PROCEDURE — 82947 ASSAY GLUCOSE BLOOD QUANT: CPT | Performed by: PSYCHIATRY & NEUROLOGY

## 2023-09-06 RX ORDER — KETAMINE HYDROCHLORIDE 10 MG/ML
0.4 INJECTION INTRAMUSCULAR; INTRAVENOUS ONCE
Status: DISCONTINUED | OUTPATIENT
Start: 2023-09-07 | End: 2023-09-07

## 2023-09-06 RX ORDER — PRAZOSIN HYDROCHLORIDE 1 MG/1
3 CAPSULE ORAL NIGHTLY
Status: DISCONTINUED | OUTPATIENT
Start: 2023-09-06 | End: 2023-09-11 | Stop reason: HOSPADM

## 2023-09-06 RX ORDER — WARFARIN SODIUM 4 MG/1
4 TABLET ORAL
Status: DISCONTINUED | OUTPATIENT
Start: 2023-09-06 | End: 2023-09-08

## 2023-09-06 RX ADMIN — ACETAMINOPHEN 650 MG: 325 TABLET, FILM COATED ORAL at 20:20

## 2023-09-06 RX ADMIN — CLONAZEPAM 0.25 MG: 0.5 TABLET ORAL at 20:17

## 2023-09-06 RX ADMIN — PREGABALIN 25 MG: 25 CAPSULE ORAL at 08:19

## 2023-09-06 RX ADMIN — LURASIDONE HYDROCHLORIDE 60 MG: 40 TABLET, FILM COATED ORAL at 17:18

## 2023-09-06 RX ADMIN — PANTOPRAZOLE SODIUM 40 MG: 40 TABLET, DELAYED RELEASE ORAL at 08:20

## 2023-09-06 RX ADMIN — WARFARIN SODIUM 4 MG: 4 TABLET ORAL at 17:18

## 2023-09-06 RX ADMIN — PREGABALIN 25 MG: 25 CAPSULE ORAL at 20:20

## 2023-09-06 RX ADMIN — AMIODARONE HYDROCHLORIDE 200 MG: 200 TABLET ORAL at 08:20

## 2023-09-06 RX ADMIN — Medication 10 MG: at 08:19

## 2023-09-06 RX ADMIN — VENLAFAXINE HYDROCHLORIDE 150 MG: 75 CAPSULE, EXTENDED RELEASE ORAL at 08:19

## 2023-09-06 RX ADMIN — PRAZOSIN HYDROCHLORIDE 3 MG: 1 CAPSULE ORAL at 20:19

## 2023-09-06 RX ADMIN — ONDANSETRON 4 MG: 4 TABLET, ORALLY DISINTEGRATING ORAL at 12:25

## 2023-09-06 NOTE — PROGRESS NOTES
"Psychiatry Progress Note    9/6/2023    Legal Status: Voluntary    Chief Complaint: suicidal ideation and depression    Subjective:  Patient is a 53 y.o. female who was hospitalized for suicidal ideation and depression.    Patient is seen in the common area of the adult unit. Patient is seated at a table reading a book. Patient states she felt angry earlier in the day after participating in a group session where she states a peer \"took over the group\". She states she became angry and wanted to confront the peer. She states she realizes that people have different forms and severity of illness and that she felt the individual would not be receptive to her concerns. Patient states she remains irritated about the situation.     Patient states she feels apprehensive about discharge. She states she is worried about returning home as well as the plan for obtaining her outpatient ketamine medication.    Patient denies SI/HI/AVH. Patient is compliant with medications. Patient attends group sessions.         Patient reports medications are effective.    Objective     Vital Signs    Vitals:    09/05/23 1158 09/05/23 1208 09/05/23 1945 09/06/23 0810   BP: 141/73 143/82 102/58 107/58   BP Location: Left arm Left arm Right arm Left arm   Patient Position: Lying Lying Sitting Sitting   Pulse: 60 60 60 61   Resp: 18 16 18 16   Temp:   97.5 °F (36.4 °C) 97.7 °F (36.5 °C)   TempSrc:   Temporal Temporal   SpO2: 98% 98% 97% 96%   Weight:       Height:           Physical Exam: as of today, 09/06/23   General Appearance: alert, appears stated age, and cooperative,  Hygiene:   fair  Gait & Station: Normal  Musculoskeletal: No tremors or abnormal involuntary movements    Mental Status Exam: as of today, 09/06/23   Cooperation:  Cooperative  Eye Contact:  Good  Psychomotor Behavior:  Appropriate  Mood: Anxious/Nervous and Worried  Affect:  mood-congruent  Speech:  Normal  Thought Process:  Coherent  Associations: Goal Directed and " Circumstantial  Thought Content:     Mood congruent   Suicidal:   Denies   Homicidal:  None   Hallucinations:  None   Delusion:  None  Cognitive Functioning:   Consciousness: awake, alert, and oriented  Reliability:   adequate  Insight:  Fair  Judgement:  Fair  Impulse Control:  Fair    Lab Results: Results source: EMR   Lab Results (last 24 hours)       Procedure Component Value Units Date/Time    Protime-INR [[id number]]  (Abnormal) Collected: 09/06/23 0622    Specimen: Blood Updated: 09/06/23 0709     Protime 19.5 Seconds      INR 1.67    Narrative:      Therapeutic range for most indications is 2.0-3.0 INR,  or 2.5-3.5 for mechanical heart valves.    Glucose, Fasting [[id number]]  (Normal) Collected: 09/06/23 0622    Specimen: Blood Updated: 09/06/23 0701     Glucose, Fasting 83 mg/dL             Radiology Results:  Imaging Results (Last 24 Hours)       ** No results found for the last 24 hours. **            Medicine:   Current Facility-Administered Medications:     acetaminophen (TYLENOL) tablet 650 mg, 650 mg, Oral, Q4H PRN, Curtis Maria II, MD, 650 mg at 09/05/23 0351    aluminum-magnesium hydroxide-simethicone (MAALOX MAX) 400-400-40 MG/5ML suspension 15 mL, 15 mL, Oral, Q6H PRN, Curtis Maria II, MD    amiodarone (PACERONE) tablet 200 mg, 200 mg, Oral, Daily, Curtis Maria II, MD, 200 mg at 09/06/23 0820    clonazePAM (KlonoPIN) tablet 0.25 mg, 0.25 mg, Oral, Nightly, Marcela Manriquez MD, 0.25 mg at 09/05/23 2012    cloNIDine (CATAPRES) tablet 0.1 mg, 0.1 mg, Oral, Q4H PRN, Curtis Maria II, MD    furosemide (LASIX) half tablet 10 mg, 10 mg, Oral, Daily, Lillie Lopez PA-C, 10 mg at 09/06/23 0819    hydrOXYzine pamoate (VISTARIL) capsule 50 mg, 50 mg, Oral, Q6H PRN, Curtis Maria II, MD, 50 mg at 09/01/23 2026    loperamide (IMODIUM) capsule 2 mg, 2 mg, Oral, Q2H PRN, Curtis Maria II, MD    Lurasidone HCl (LATUDA) tablet 60 mg, 60 mg, Oral, Daily With  Dinner, Curtis Maria II, MD, 60 mg at 09/05/23 1717    magnesium hydroxide (MILK OF MAGNESIA) suspension 10 mL, 10 mL, Oral, Daily PRN, Curtis Maria II, MD, 10 mL at 09/05/23 1402    ondansetron ODT (ZOFRAN-ODT) disintegrating tablet 4 mg, 4 mg, Oral, Q6H PRN, Curtis Maria II, MD, 4 mg at 09/06/23 1225    pantoprazole (PROTONIX) EC tablet 40 mg, 40 mg, Oral, Daily, Curtis Maria II, MD, 40 mg at 09/06/23 0820    Pharmacy to dose warfarin, , Does not apply, Continuous PRN, Omaira Willoughby APRN    prazosin (MINIPRESS) capsule 2 mg, 2 mg, Oral, Nightly, Curtis Maria II, MD, 2 mg at 09/05/23 2012    pregabalin (LYRICA) capsule 25 mg, 25 mg, Oral, BID, Curtis Maria II, MD, 25 mg at 09/06/23 0819    venlafaxine XR (EFFEXOR-XR) 24 hr capsule 150 mg, 150 mg, Oral, Daily With Breakfast, Marcela Manriquez MD, 150 mg at 09/06/23 0819    warfarin (COUMADIN) tablet 4 mg, 4 mg, Oral, Daily, Omaira Willoughby APRN    Diagnoses/Assessment:     Suicidal ideation    Bipolar I disorder, most recent episode depressed, severe without psychotic features    Bereavement    Post traumatic stress disorder (PTSD)    Problems of guilt    Nihilism    Chronic atrial fibrillation    GERD (gastroesophageal reflux disease)    Ineffective coping      Treatment Plan:    1) Will continue care for the patient on the behavioral health unit at [facility] to ensure patient safety.  2) Will continue to provide treatment with the unit milieu, activities, therapies and psychopharmacological management.  3) Patient to be placed on or continued on  Q15 minute checks  and Suicide precautions.  4) Pertinent medical issues:   -Atrial fibrillation, chronic: Continue home amiodarone and Coumadin, pharmacy to dose. Paced.  -GERD: Continue PPI.  -ANANDA resolved  -Chronic pain/neuorapthy: cont Lyrica 25mg BID  -Edema: Lasix 10mg daily.  Complaint of leg cramps.  Review of  BMP results: K+ 4.2  5)  Will order following labs: Reviewed  6) Will make the following medication changes:   Bipolar d/o, PTSD, guilt, grief, SI  --Cont Latuda 60mg qDinner   --Cont Prazosin 2 mg qhs  --Cont Klonopin 0.25mg qhs and consider increase as appropriate  --Cont Effexor XR 150mg daily with breakfast   --Ketamine infusions:              --First infusion, 7/26/23 @ 0.4mg/kg over 45 min; Pre MADRS 51  --Second, 7/28/23 @ 0.45 mg/kg over 45 min; Pre MADRS 38  --Third, 9/1/23 @ 0.35mg/kg at 150ml/hr (about 45 min)  --Fourth, 9/3/23 @ 0.4mg/kg.at 150ml/hr (about 45 min)  --Fifth 9/5/23 @ 0.4mg/kg at 150ml/hr (about 45 min)  --Plan for 6th infusion on 9/7/23 @ 0.4mg/kg  7) Will continue discharge planning for patient: outpatient psychiatric care, outpatient medical care, safety planning and placement as appropriate.    Treatment plan and medication risks and benefits discussed with: Patient and treatment team    Cherelle Hill, DESIREE  09/06/23 at 15:19 CDT

## 2023-09-06 NOTE — PLAN OF CARE
Goal Outcome Evaluation:  Plan of Care Reviewed With: patient  Patient Agreement with Plan of Care: agrees  Consent Given to Review Plan with: Refused  Progress: improving  Outcome Evaluation: Pt more alert and talkative and voiced anxiety. Pt has slept 7.25 hrs so far this shift.

## 2023-09-06 NOTE — PROGRESS NOTES
"Anticoagulation by Pharmacy - Warfarin    Wing Hickman is a 53 y.o.female who has been consulted for warfarin for atrial fibrillation      Home regimen: Warfarin 6 mg PO MWFSun and 3 mg TThSat   INR Goal: 2-3    Objective:  [Ht: 157.5 cm (62.01\"); Wt: 80.7 kg (178 lb)]    Lab Results   Component Value Date    INR 1.67 (H) 09/06/2023    INR 1.49 (H) 09/04/2023    INR 2.63 (H) 09/02/2023    PROTIME 19.5 (H) 09/06/2023    PROTIME 17.9 (H) 09/04/2023    PROTIME 27.5 (H) 09/02/2023     Lab Results   Component Value Date    HGB 13.7 08/26/2023    HGB 12.6 08/23/2023    HGB 13.0 08/22/2023    HCT 41.6 08/26/2023    HCT 39.1 08/23/2023    HCT 40.1 08/22/2023     08/26/2023     08/23/2023     (L) 08/22/2023       Recent Warfarin Administrations       The 5 most recent administrations since 08/29/2023 are shown below each listed medication.    Warfarin Sodium         Order Dose Date Given     warfarin (COUMADIN) tablet 3 mg 3 mg 09/04/2023     warfarin (COUMADIN) tablet 2 mg 2 mg 09/03/2023      2 mg 09/02/2023     warfarin (COUMADIN) tablet 2 mg 2 mg 08/31/2023     warfarin (COUMADIN) tablet 3 mg 3 mg 08/30/2023                    Assessment  H/H/plts none since 8/26. No signs or symptoms of bleeding noted.   Interacting medications: amiodarone, venlafaxine  INR was subtherapeutic.     Plan:  Give warfarin 4 mg mg PO @ 1800 tonight  PT/INR ordered daily  Pharmacy will continue to follow    Thank you,     Carina Patel, Pharm.D.   9/6/2023  15:15 CDT      "

## 2023-09-06 NOTE — MEDICAL STUDENT
Psychiatry Progress Note    9/6/2023    Legal Status: Voluntary     Chief Complaint: suicidal ideation and depression    Subjective:  Patient is a 53 y.o. female who was hospitalized for suicidal ideation and depression.    Ms Hickman was seen this morning in the common area on the adult BHU. She is doing better this morning and feels like the ketamine treatments are helping her. Her last hospital infusion is tomorrow and is thinking she would go home on Friday if all is well. She is starting to get a little anxious about the idea of going home back by herself. We talked about trying to find some friends at her apartment and at her [employer] that she can socialize with in order to stay connected. Pt stated she is also looking forward to getting back to her favorite hobbies of baking and art once she returns home.     She would like to talk about feasibility and logistics of how outpatient infusions will work. She said she can't drive more than 30 minutes to a clinic so she has become a little anxious about that.     Pt denied Si/HI/AVH.     Patient reports medications are tolerable and effective.    Objective     Vital Signs    Vitals:    09/05/23 1158 09/05/23 1208 09/05/23 1945 09/06/23 0810   BP: 141/73 143/82 102/58 107/58   BP Location: Left arm Left arm Right arm Left arm   Patient Position: Lying Lying Sitting Sitting   Pulse: 60 60 60 61   Resp: 18 16 18 16   Temp:   97.5 °F (36.4 °C) 97.7 °F (36.5 °C)   TempSrc:   Temporal Temporal   SpO2: 98% 98% 97% 96%   Weight:       Height:           Physical Exam: as of today, 09/06/23   General Appearance: alert, pleasant, appears stated age, and cooperative,  Hygiene:   good  Gait & Station: Normal  Musculoskeletal: No tremors or abnormal involuntary movements    Mental Status Exam: as of today, 09/06/23   Cooperation:  Cooperative  Eye Contact:  Good  Psychomotor Behavior:  Appropriate  Mood: Improving  Affect:  normal  Speech:  Normal  Thought Process:   Coherent  Associations: Goal Directed  Thought Content:     Normal   Suicidal:  None   Homicidal:  None   Hallucinations:  None   Delusion:  None  Cognitive Functioning:   Consciousness: awake, alert, and oriented, Orientation:  Person, Place, Time, and Situation, and Attention: normal; Concentration: Normal  Reliability:  good  Insight:  Good  Judgement:  Good  Impulse Control:  Good    Lab Results: Results source: EMR   Lab Results (last 24 hours)       Procedure Component Value Units Date/Time    Protime-INR [[id number]]  (Abnormal) Collected: 09/06/23 0622    Specimen: Blood Updated: 09/06/23 0709     Protime 19.5 Seconds      INR 1.67    Narrative:      Therapeutic range for most indications is 2.0-3.0 INR,  or 2.5-3.5 for mechanical heart valves.    Glucose, Fasting [[id number]]  (Normal) Collected: 09/06/23 0622    Specimen: Blood Updated: 09/06/23 0701     Glucose, Fasting 83 mg/dL     Basic Metabolic Panel [[id number]]  (Abnormal) Collected: 09/05/23 1220    Specimen: Blood Updated: 09/05/23 1309     Glucose 75 mg/dL      BUN 19 mg/dL      Creatinine 1.08 mg/dL      Sodium 141 mmol/L      Potassium 4.2 mmol/L      Comment: Slight hemolysis detected by analyzer. Results may be affected.        Chloride 109 mmol/L      CO2 25.0 mmol/L      Calcium 8.7 mg/dL      BUN/Creatinine Ratio 17.6     Anion Gap 7.0 mmol/L      eGFR 62.3 mL/min/1.73     Narrative:      GFR Normal >60  Chronic Kidney Disease <60  Kidney Failure <15              Radiology Results:  Imaging Results (Last 24 Hours)       ** No results found for the last 24 hours. **            Medicine:   Current Facility-Administered Medications:     acetaminophen (TYLENOL) tablet 650 mg, 650 mg, Oral, Q4H PRN, Curtis Maria II, MD, 650 mg at 09/05/23 0351    aluminum-magnesium hydroxide-simethicone (MAALOX MAX) 400-400-40 MG/5ML suspension 15 mL, 15 mL, Oral, Q6H PRN, Curtis Maria II, MD    amiodarone (PACERONE) tablet 200 mg, 200 mg, Oral,  Daily, Curtis Maria II, MD, 200 mg at 09/06/23 0820    clonazePAM (KlonoPIN) tablet 0.25 mg, 0.25 mg, Oral, Nightly, Marcela Manriquez MD, 0.25 mg at 09/05/23 2012    cloNIDine (CATAPRES) tablet 0.1 mg, 0.1 mg, Oral, Q4H PRN, Curtis Maria II, MD    furosemide (LASIX) half tablet 10 mg, 10 mg, Oral, Daily, Lillie Lopez PA-C, 10 mg at 09/06/23 0819    hydrOXYzine pamoate (VISTARIL) capsule 50 mg, 50 mg, Oral, Q6H PRN, Curtis Maria II, MD, 50 mg at 09/01/23 2026    loperamide (IMODIUM) capsule 2 mg, 2 mg, Oral, Q2H PRN, Curtis Maria II, MD    Lurasidone HCl (LATUDA) tablet 60 mg, 60 mg, Oral, Daily With Dinner, Curtis Maria II, MD, 60 mg at 09/05/23 1717    magnesium hydroxide (MILK OF MAGNESIA) suspension 10 mL, 10 mL, Oral, Daily PRN, Curtis Maria II, MD, 10 mL at 09/05/23 1402    ondansetron ODT (ZOFRAN-ODT) disintegrating tablet 4 mg, 4 mg, Oral, Q6H PRN, Curtis Maria II, MD    pantoprazole (PROTONIX) EC tablet 40 mg, 40 mg, Oral, Daily, Curtis Maria II, MD, 40 mg at 09/06/23 0820    Pharmacy to dose warfarin, , Does not apply, Continuous PRN, Omaira Willoughby G, APRN    prazosin (MINIPRESS) capsule 2 mg, 2 mg, Oral, Nightly, Curtis Maria II, MD, 2 mg at 09/05/23 2012    pregabalin (LYRICA) capsule 25 mg, 25 mg, Oral, BID, Curtis Maria II, MD, 25 mg at 09/06/23 0819    venlafaxine XR (EFFEXOR-XR) 24 hr capsule 150 mg, 150 mg, Oral, Daily With Breakfast, Marcela Manriquez MD, 150 mg at 09/06/23 0819    warfarin (COUMADIN) tablet 3 mg, 3 mg, Oral, Daily, Marcela Manriquez MD, 3 mg at 09/05/23 1717    Diagnoses/Assessment:     Suicidal ideation    Bipolar I disorder, most recent episode depressed, severe without psychotic features    Bereavement    Post traumatic stress disorder (PTSD)    Problems of guilt    Nihilism    Chronic atrial fibrillation    GERD (gastroesophageal reflux disease)    Ineffective  coping      Treatment Plan:    1) Will continue care for the patient on the behavioral health unit at [facility] to ensure patient safety.  2) Will continue to provide treatment with the unit milieu, activities, therapies and psychopharmacological management.  3) Patient to be placed on or continued on  Q15 minute checks  and Suicide precautions.  4) Pertinent medical issues: None   5) Will order following labs: None  6) Will make the following medication changes: None  7) Will continue discharge planning for patient: outpatient psychiatric care, outpatient medical care, safety planning and placement as appropriate.    Treatment plan and medication risks and benefits discussed with: Patient and Treatment team     Hector Hatfield, Medical Student  09/06/23 at 08:58 CDT

## 2023-09-06 NOTE — PLAN OF CARE
Goal Outcome Evaluation:  Plan of Care Reviewed With: patient        Progress: improving  Outcome Evaluation: Patient is doing well.  Anxious about going home.  Says she feels safe here.  Has support from her [employer].

## 2023-09-06 NOTE — NURSING NOTE
Behavior   Note any precipitants to event or behavior   Describe level and action of any aggressive behavior or speech and associated interventions.     Anxiety: Excess Worry  Depression: depressed mood and Patient denies at this time  Pain  0  AVH   no  S/I   no  Plan  no  H/I   no  Plan  no    Affect   blunted      Note: Pt calm and cooperative with anxiety and depression noted. Denies SI/HI/AVH. Compliant with scheduled medications. Participated in group. No other concerns voiced at this time.       Intervention    PRN medication utilized:  no    Instructed in medication usage and effects  Medications administered as ordered  Encouraged to verbalize needs      Response    Verbalized understanding   Did patient take medications as ordered yes  Did patient interact with assessment?  yes     Plan    Will monitor for safety  Will monitor every 15 minutes as ordered  Will evaluate and promote the plan of care    Last BM:  unknown date  (Please chart in I/O as well)

## 2023-09-06 NOTE — NURSING NOTE
"Behavior   Note any precipitants to event or behavior   Describe level and action of any aggressive behavior or speech and associated interventions.     Anxiety: Excess Worry  Depression: Patient denies at this time  Pain  0  AVH   no  S/I   no  Plan  no  H/I   no  Plan  no    Affect   blunted      Note:  Patient was seen in common area alert and oriented.  Patient had concerns about going home.  Stated \"I feel safe here.\"  Asked patient if she had some support at home. Patient said her mom lives in [place] and her dad lives out of state also.  She says she has her [place] family and she was going to have help.  Patient is anxious.  Will continue to monitor.      Intervention    PRN medication utilized:  no    Instructed in medication usage and effects  Medications administered as ordered  Encouraged to verbalize needs      Response    Verbalized understanding   Did patient take medications as ordered yes   Did patient interact with assessment?  yes     Plan    Will monitor for safety  Will monitor every 15 minutes as ordered  Will evaluate and promote the plan of care    Last BM:  unknown date  (Please chart in I/O as well)    "

## 2023-09-06 NOTE — PROGRESS NOTES
"Met with pt this am in [facility] area. Pt states she is ready to go home, however feels fearful. Pt states she knows \"this is a safe place and home is not.\" Pt unable to state why home was not safe. Pt and MSW discussed coping skills and importance of aftercare. Pt states she has transportation but will not go further than 25 minutes from home. Discussed [facility] clinic and pt would like \"pill form.\" Pt denies SI\HI\AVH at this time.     Will discuss with MD  "

## 2023-09-06 NOTE — PLAN OF CARE
Problem: Adult Behavioral Health Plan of Care  Goal: Plan of Care Review  Outcome: Ongoing, Progressing  Flowsheets  Taken 9/6/2023 1340 by Katelyn Wolfe MSW  Progress: improving  Plan of Care Reviewed With: patient  Patient Agreement with Plan of Care: agrees  Outcome Evaluation: Pt improving, but states feeling fearful of going home. Pt denies SI\HI\AVH  Taken 9/6/2023 0417 by Ramona Burgess LPN  Consent Given to Review Plan with: Refused  Goal: Patient-Specific Goal (Individualization)  Outcome: Ongoing, Progressing  Flowsheets  Taken 8/30/2023 1249  Anxieties, Fears or Concerns: denies  Taken 8/25/2023 1605  Patient Personal Strengths:   ability to maintain sobriety   resilient   stable living environment  Patient-Specific Goals (Include Timeframe): pt to have no SI\HI\AVH at time of d\c and to verbalize 1-3 learned coping skills  Individualized Care Needs: group, education, safety planning, aftecare, family session  Patient Vulnerabilities:   adverse childhood experience(s)   family/relationship conflict   lacks insight into illness   poor physical health   poor impulse control   recent loss   limited social skills   limited support system   occupational insecurity   housing insecurity   history of unsuccessful treatment  Goal: Optimized Coping Skills in Response to Life Stressors  Outcome: Ongoing, Progressing  Flowsheets (Taken 9/6/2023 1340)  Optimized Coping Skills in Response to Life Stressors: making progress toward outcome  Intervention: Promote Effective Coping Strategies  Flowsheets (Taken 9/6/2023 1340)  Supportive Measures:   active listening utilized   verbalization of feelings encouraged  Goal: Develops/Participates in Therapeutic [place] to Support Successful Transition  Outcome: Ongoing, Progressing  Flowsheets (Taken 9/6/2023 1340)  Develops/Participates in Therapeutic [place] to Support Successful Transition: making progress toward outcome  Intervention: Foster Therapeutic  [place]  Flowsheets (Taken 9/6/2023 1340)  Trust Relationship/Rapport:   care explained   choices provided   reassurance provided   thoughts/feelings acknowledged   emotional support provided   empathic listening provided   questions answered   questions encouraged  Intervention: Mutually Develop Transition Plan  Flowsheets  Taken 9/6/2023 1340  Transition Support: follow-up care discussed  Offered/Gave Vendor List: no  Taken 8/30/2023 1249  Patient/Family Anticipates Transition to: home  Taken 8/28/2023 1351  Anticipated Discharge Disposition: home or self-care  Readmission Within the Last 30 Days: previous discharge plan unsuccessful  Taken 8/25/2023 1605  Outpatient/Agency/Support Group Needs:   case management   intensive outpatient services   outpatient medication management   outpatient counseling  [facility]  Transportation Anticipated: public transportation  Transportation Concerns: no car  Current Discharge Risk:   psychiatric illness   lack of support system/caregiver  Concerns to be Addressed:   patient refuses services   adjustment to diagnosis/illness   basic needs   decision-making   coping/stress   compliance issue   cognitive/perceptual   care coordination/care conferences   discharge planning   grief and loss   relationship   mental health   medication   suicidal  Patient/Family Anticipated Services at Transition:      mental health services   outpatient care  Patient's Choice of Community Agency(s): [facility] or [facility]     Problem: Suicide Risk  Goal: Absence of Self-Harm  Intervention: Promote Psychosocial Wellbeing  Recent Flowsheet Documentation  Taken 9/6/2023 1340 by Katelyn Wolfe MSW  Supportive Measures:   active listening utilized   verbalization of feelings encouraged     Goal Outcome Evaluation:  Plan of Care Reviewed With: patient  Patient Agreement with Plan of Care:  agrees  Consent Given to Review Plan with: refused  Progress: improving  Outcome Evaluation: Pt improving, but states feeling fearful of going home. Pt denies SI\HI\AVH

## 2023-09-07 RX ORDER — KETAMINE HYDROCHLORIDE 10 MG/ML
0.4 INJECTION INTRAMUSCULAR; INTRAVENOUS ONCE
Status: DISCONTINUED | OUTPATIENT
Start: 2023-09-07 | End: 2023-09-07

## 2023-09-07 RX ORDER — SODIUM CHLORIDE 9 MG/ML
INJECTION, SOLUTION INTRAVENOUS
Status: DISPENSED
Start: 2023-09-07 | End: 2023-09-07

## 2023-09-07 RX ADMIN — VENLAFAXINE HYDROCHLORIDE 150 MG: 75 CAPSULE, EXTENDED RELEASE ORAL at 08:20

## 2023-09-07 RX ADMIN — PREGABALIN 25 MG: 25 CAPSULE ORAL at 20:11

## 2023-09-07 RX ADMIN — WARFARIN SODIUM 4 MG: 4 TABLET ORAL at 18:42

## 2023-09-07 RX ADMIN — SODIUM CHLORIDE 32 MG: 9 INJECTION, SOLUTION INTRAVENOUS at 17:45

## 2023-09-07 RX ADMIN — PRAZOSIN HYDROCHLORIDE 3 MG: 1 CAPSULE ORAL at 20:11

## 2023-09-07 RX ADMIN — ACETAMINOPHEN 650 MG: 325 TABLET, FILM COATED ORAL at 08:20

## 2023-09-07 RX ADMIN — PANTOPRAZOLE SODIUM 40 MG: 40 TABLET, DELAYED RELEASE ORAL at 08:20

## 2023-09-07 RX ADMIN — CLONAZEPAM 0.25 MG: 0.5 TABLET ORAL at 20:12

## 2023-09-07 RX ADMIN — LURASIDONE HYDROCHLORIDE 60 MG: 40 TABLET, FILM COATED ORAL at 20:11

## 2023-09-07 RX ADMIN — PREGABALIN 25 MG: 25 CAPSULE ORAL at 08:20

## 2023-09-07 RX ADMIN — AMIODARONE HYDROCHLORIDE 200 MG: 200 TABLET ORAL at 08:20

## 2023-09-07 RX ADMIN — Medication 10 MG: at 08:20

## 2023-09-07 NOTE — NURSING NOTE
Behavior   Note any precipitants to event or behavior   Describe level and action of any aggressive behavior or speech and associated interventions.     Anxiety: Excess Worry and Restless/Edgy  Depression: depressed mood  Pain  0  AVH   no  S/I   no  Plan  no  H/I   no  Plan  no    Affect   blunted      Note: Patient resting in bed when this nurse entered room. Patient denied SI, HI and AVH. Patient reports anxiety 8/10 and depression 3/10. Patient cooperative and compliant with  medications. Patient aware she is NPO at midnight for ketamine infusion.      Intervention    PRN medication utilized:  no    Instructed in medication usage and effects  Medications administered as ordered  Encouraged to verbalize needs      Response    Verbalized understanding   Did patient take medications as ordered yes   Did patient interact with assessment?  yes     Plan    Will monitor for safety  Will monitor every 15 minutes as ordered  Will evaluate and promote the plan of care    Last BM:  unknown date  (Please chart in I/O as well)

## 2023-09-07 NOTE — PROGRESS NOTES
"Anticoagulation by Pharmacy - Warfarin    Wing Hickman is a 53 y.o.female being continued on warfarin for atrial fibrillation    Home regimen: Warfarin 6 mg PO MonWedFriSun and warfarin 3 mg TueThuSat  INR Goal: 2-3  Last INR:   Lab Results   Component Value Date    INR 1.67 (H) 09/06/2023       Objective:  [Ht: 157.5 cm (62.01\"); Wt: 80.7 kg (178 lb)]  Lab Results   Component Value Date    INR 1.67 (H) 09/06/2023    INR 1.49 (H) 09/04/2023    INR 2.63 (H) 09/02/2023    PROTIME 19.5 (H) 09/06/2023    PROTIME 17.9 (H) 09/04/2023    PROTIME 27.5 (H) 09/02/2023     Lab Results   Component Value Date    HGB 13.7 08/26/2023    HGB 12.6 08/23/2023    HGB 13.0 08/22/2023    HCT 41.6 08/26/2023    HCT 39.1 08/23/2023    HCT 40.1 08/22/2023     08/26/2023     08/23/2023     (L) 08/22/2023       Recent Warfarin Administrations     The 5 most recent administrations since 08/31/2023 are shown below each listed medication.    Warfarin Sodium       Order Dose Date Given     warfarin (COUMADIN) tablet 4 mg 4 mg 09/06/2023     warfarin (COUMADIN) tablet 3 mg 3 mg 09/05/2023      3 mg 09/04/2023     warfarin (COUMADIN) tablet 2 mg 2 mg 09/03/2023      2 mg 09/02/2023                Assessment  Interacting medications: amiodarone, venlafaxine  No INR today (INR ordered every other day). Will continue to monitor trends  No H&H or platelets today - will continue to monitor trends  No signs or symptoms of bleeding noted    Patient received warfarin 4 mg yesterday at 1718.     Plan:  1.  Give warfarin 4 mg tablet PO @ 1800 tonight  2.  Draw a PT/INR in AM  3.  Pharmacy will continue to follow    Pierre Amezquita RPH  09/07/23 11:40 CDT   "

## 2023-09-07 NOTE — PROGRESS NOTES
"Psychiatry Progress Note   9/7/2023      HD: #15  Legal: Vol    Chief Complaint: Bipolar Depression      Subjective --  Ms. Wing Hickman is a 53 y.o. female who was seen on the Adult unit.      Today, on 09/07/23:    See at several points in the day.  Tolerated Ketamine infusion well at 0.4mg/kg.    Interested in trial of Auvelity after Ketamine given worry about transition home and lack of transportation.       Review of Systems:  --Neuro: Denies headache  --GI: Denies stomachache        Objective   Objective --    Vital Signs:  Temp:  [97.7 °F (36.5 °C)-98.4 °F (36.9 °C)] 98.4 °F (36.9 °C)  Heart Rate:  [60-93] 60  Resp:  [16-20] 18  BP: (115-136)/(64-76) 121/64    Patient Vitals for the past 24 hrs:   BP Temp Temp src Pulse Resp SpO2   09/07/23 1759 121/64 -- -- 60 18 100 %   09/07/23 1749 135/68 -- -- 62 18 100 %   09/07/23 1744 133/75 98.4 °F (36.9 °C) Temporal 75 18 96 %   09/07/23 0929 136/71 97.7 °F (36.5 °C) Temporal 93 20 97 %   09/06/23 1900 115/76 97.9 °F (36.6 °C) Tympanic 62 16 98 %           Today, on 09/07/23:    Targeted PE:  -General: in NAD  -MSK: Well nourished in appearence and well developed.  No noted/overt atrophy.    -Gait unremarkable.  -Neuro: No tremor    MSE:   -GENERAL: In NAD; appears approx. biological age.  -APPEARENCE: Adequately-groomed; fair hygiene.    -BEHAVIOR: Calm, cooperative, friendly, and socially appropriate. Made fair eye contact. No marked psychomotor retardation/agitation appreciated. No tardive movements.   -SPEECH: Normal in tone, volume, jad, and quality. No dysarthria nor overtly pressured speech noted. No paraphasic errors or neologisms appreciable.  -MOOD: \"OK\"   -AFFECT: Mood appears congruent with thought processing. Range is constricted  -THOUGHT PROCESS & ASSOCIATIONS: Appears coherent: logical and goal oriented. Causality appears intact. No FoI, Quan, or fixations noted.  -THOUGHT CONTENT: Denies suicidal or homicidal ideations. Without overt " delusions or paranoia. No psychotic distortions.   -THEMES: none  -PERCEPTIONS: Denies auditory/visual hallucinations. Did not appear to be responding to internal stimuli. No overt psychosis.  -COGNITIVE/EXECUTIVE FUNCTIONS: Alert & Orientated x 4.  Concentration and attention are adequate and improving.  Language & vocabulary are adequate.  Both recent & remote memory appears adequate based upon interaction during interview.  Adequate fund of knowledge. Abstraction generally intact to interview.    -RELIABILITY: adequate.   -IMPULSE CONTROL: adequate.  -INSIGHT/JUDGMENT: Improving.      Labs & Imaging  Lab Results (last 24 hours)       ** No results found for the last 24 hours. **          Results source: EMR    Imaging Results (Last 24 Hours)       ** No results found for the last 24 hours. **          Results source: EMR      Hospital Medications:   Scheduled Meds:amiodarone, 200 mg, Oral, Daily  clonazePAM, 0.25 mg, Oral, Nightly  furosemide, 10 mg, Oral, Daily  ketamine (KETALAR) IVPB, 0.4 mg/kg, Intravenous, Once  lurasidone, 60 mg, Oral, Daily With Dinner  pantoprazole, 40 mg, Oral, Daily  prazosin, 3 mg, Oral, Nightly  pregabalin, 25 mg, Oral, BID  sodium chloride, , ,   venlafaxine XR, 150 mg, Oral, Daily With Breakfast  warfarin, 4 mg, Oral, Daily      Continuous Infusions:Pharmacy to dose warfarin,       PRN Meds:.  acetaminophen    aluminum-magnesium hydroxide-simethicone    cloNIDine    hydrOXYzine pamoate    loperamide    magnesium hydroxide    ondansetron ODT    Pharmacy to dose warfarin      Assessment:     Suicidal ideation    Bipolar I disorder, most recent episode depressed, severe without psychotic features    Bereavement    Post traumatic stress disorder (PTSD)    Problems of guilt    Nihilism    Chronic atrial fibrillation    GERD (gastroesophageal reflux disease)    Ineffective coping      Treatment Plan:  1) Will continue care for the patient on the behavioral health unit at [facility]  [place].      2) Will continue to provide treatment with the unit milieu, activities, therapies and psychopharmacological management.    3) Patient to be placed on or continued on  Q15 minute checks  and Suicide precautions.    4) Treatment Planning:  -Atrial fibrillation, chronic: Continue home amiodarone and Coumadin, pharmacy to dose. Paced.  -GERD: Continue PPI.  -ANANDA resolved  -Chronic pain/neuorapthy: cont Lyrica 25mg BID  -Edema: Lasix 10mg daily.  Complaint of leg cramps.  Review of  BMP results: K+ 4.2     Bipolar d/o, PTSD, guilt, grief, SI  --Cont Latuda 60mg qDinner   --Cont Prazosin 2 mg qhs  --Cont Klonopin 0.25mg qhs and consider increase as appropriate  --Cont Effexor XR 150mg daily with breakfast   --Ketamine infusions:              --First infusion, 7/26/23 @ 0.4mg/kg over 45 min; Pre MADRS 51  --Second, 7/28/23 @ 0.45 mg/kg over 45 min; Pre MADRS 38  --Third, 9/1/23 @ 0.35mg/kg at 150ml/hr (about 45 min)  --Fourth, 9/3/23 @ 0.4mg/kg.at 150ml/hr (about 45 min)  --Fifth 9/5/23 @ 0.4mg/kg at 150ml/hr (about 45 min)  --Sixth infusion on 9/7/23 @ 0.4mg/kg over ~50 min    --Start Auvelity in AM with Wellbutrin SR 100mg + Dextromethorphan 45mg qAM         --> Psychotherapy provided: as below    --> Disposition Planning: home, likely Monday to ensure gains over weekend, tolerability of NMDA oral agent    Treatment planning, along with medication benefits/risks/AE, alternatives discussed with: Patient and Treatment Team.      >60 min spent on care, primarily during direct care during infusion and then through day, and independent of therapy below    Curtis Maria II, MD  09/07/23 @ 18:02 CDT   Dictated using Dragon.        Psychotherapy Note  --Total Psychotherapy Time: 18 minutes  --Participants: Patient, myself  --Current Clinical Status: worry  --Focus of Therapeutic Encounter: schema, insight  --Intervention Type: Supportive, CBT/cognitive, and Psycho-Educational  --Therapy notes: I provided  reflective listening, supportive therapy, reflection, and allowed them to express affect in therapy course.  Cognitive behavioral therapy targeting schema burden, distortions - identifying and challenging these.  Education to situation, med.  Insight to bx and schema.   --DX: as above  --Plan: Continue to work on developing & strengthening coping skills; correcting maladaptive schema; work on schema.  Emotional regulation handouts from DBT workbook  --Post therapy status: improving affect and insight

## 2023-09-07 NOTE — PLAN OF CARE
Goal Outcome Evaluation:  Plan of Care Reviewed With: patient  Patient Agreement with Plan of Care: agrees     Progress: improving  Outcome Evaluation: Patient has slept approximately 5.5 hours thus far during shift. Patient denied SI. No falls thus far during shift.

## 2023-09-07 NOTE — PLAN OF CARE
Goal Outcome Evaluation:  Plan of Care Reviewed With: patient  Patient Agreement with Plan of Care: agrees     Progress: improving  Outcome Evaluation: No changes this shift. Participated in most groups. Patieint awaiting ketamine infusion this afternoon.

## 2023-09-07 NOTE — NURSING NOTE
Behavior   Note any precipitants to event or behavior   Describe level and action of any aggressive behavior or speech and associated interventions.     Anxiety: Patient denies at this time  Depression: Patient denies at this time  Pain  0  AVH   no  S/I   no  Plan  no  H/I   no  Plan  no    Affect   mood-congruent      Note: Patient seen in adult common area individually for assessment. Oriented x4. Denies SI/HI/AVH. Took scheduled AM meds with sip of water per order. Patient has been npo since midnight due to ketamine infusion today. Per MD, ketamine will now be given this evening.       Intervention    PRN medication utilized:  no    Instructed in medication usage and effects  Medications administered as ordered  Encouraged to verbalize needs      Response    Verbalized understanding   Did patient take medications as ordered yes   Did patient interact with assessment?  yes     Plan    Will monitor for safety  Will monitor every 15 minutes as ordered  Will evaluate and promote the plan of care    Last BM:  unknown date  (Please chart in I/O as well)

## 2023-09-07 NOTE — PROGRESS NOTES
"Met with pt this date. Pt reports being upset about another peer on the unit \"taking up all time.\" Pt reports she used her coping skills and did not act out in anger. Pt denies SI\HI\AVH at this time. MSW provided pt with a Grief Workbook and discussed chapters with pt. Pt states she will work on this today and over the weekend. Pt and MSW discussed possibility of d\c next week, and pt reports \"I'm ok with that, I want all the medicine to work right.\"   "

## 2023-09-08 LAB
INR PPP: 1.86 (ref 0.8–1.2)
PROTHROMBIN TIME: 21.1 SECONDS (ref 11.1–15.3)

## 2023-09-08 PROCEDURE — 85610 PROTHROMBIN TIME: CPT | Performed by: NURSE PRACTITIONER

## 2023-09-08 RX ORDER — WARFARIN SODIUM 2.5 MG/1
2.5 TABLET ORAL
Status: DISCONTINUED | OUTPATIENT
Start: 2023-09-08 | End: 2023-09-10

## 2023-09-08 RX ORDER — PRAZOSIN HYDROCHLORIDE 1 MG/1
1 CAPSULE ORAL ONCE
Status: COMPLETED | OUTPATIENT
Start: 2023-09-08 | End: 2023-09-08

## 2023-09-08 RX ORDER — DEXTROMETHORPHAN POLISTIREX 30 MG/5ML
45 SUSPENSION ORAL EVERY 12 HOURS SCHEDULED
Status: DISCONTINUED | OUTPATIENT
Start: 2023-09-08 | End: 2023-09-10

## 2023-09-08 RX ORDER — BUPROPION HYDROCHLORIDE 100 MG/1
100 TABLET, EXTENDED RELEASE ORAL DAILY
Status: DISCONTINUED | OUTPATIENT
Start: 2023-09-08 | End: 2023-09-10

## 2023-09-08 RX ORDER — HYDROCORTISONE 25 MG/G
CREAM TOPICAL 2 TIMES DAILY PRN
Status: DISCONTINUED | OUTPATIENT
Start: 2023-09-08 | End: 2023-09-11 | Stop reason: HOSPADM

## 2023-09-08 RX ADMIN — VENLAFAXINE HYDROCHLORIDE 150 MG: 75 CAPSULE, EXTENDED RELEASE ORAL at 08:31

## 2023-09-08 RX ADMIN — AMIODARONE HYDROCHLORIDE 200 MG: 200 TABLET ORAL at 08:31

## 2023-09-08 RX ADMIN — LURASIDONE HYDROCHLORIDE 60 MG: 40 TABLET, FILM COATED ORAL at 17:25

## 2023-09-08 RX ADMIN — ACETAMINOPHEN 650 MG: 325 TABLET, FILM COATED ORAL at 04:40

## 2023-09-08 RX ADMIN — DEXTROMETHORPHAN 45 MG: 30 SUSPENSION, EXTENDED RELEASE ORAL at 20:36

## 2023-09-08 RX ADMIN — PANTOPRAZOLE SODIUM 40 MG: 40 TABLET, DELAYED RELEASE ORAL at 08:31

## 2023-09-08 RX ADMIN — PRAZOSIN HYDROCHLORIDE 3 MG: 1 CAPSULE ORAL at 20:36

## 2023-09-08 RX ADMIN — BUPROPION HYDROCHLORIDE 100 MG: 100 TABLET, FILM COATED, EXTENDED RELEASE ORAL at 11:56

## 2023-09-08 RX ADMIN — PREGABALIN 25 MG: 25 CAPSULE ORAL at 08:31

## 2023-09-08 RX ADMIN — PREGABALIN 25 MG: 25 CAPSULE ORAL at 20:36

## 2023-09-08 RX ADMIN — MAGNESIUM HYDROXIDE 10 ML: 2400 SUSPENSION ORAL at 17:30

## 2023-09-08 RX ADMIN — HYDROXYZINE PAMOATE 50 MG: 50 CAPSULE ORAL at 20:36

## 2023-09-08 RX ADMIN — CLONAZEPAM 0.25 MG: 0.5 TABLET ORAL at 20:36

## 2023-09-08 RX ADMIN — WARFARIN SODIUM 2.5 MG: 2.5 TABLET ORAL at 17:25

## 2023-09-08 RX ADMIN — PRAZOSIN HYDROCHLORIDE 1 MG: 1 CAPSULE ORAL at 16:57

## 2023-09-08 RX ADMIN — HYDROXYZINE PAMOATE 50 MG: 50 CAPSULE ORAL at 14:04

## 2023-09-08 RX ADMIN — DEXTROMETHORPHAN 45 MG: 30 SUSPENSION, EXTENDED RELEASE ORAL at 11:56

## 2023-09-08 RX ADMIN — Medication 10 MG: at 08:31

## 2023-09-08 NOTE — PLAN OF CARE
Goal Outcome Evaluation:  Plan of Care Reviewed With: patient  Patient Agreement with Plan of Care: agrees     Progress: improving  Outcome Evaluation: Patient continues to c/o anxiety. Dr. Maria ordered prn prazosin.

## 2023-09-08 NOTE — NURSING NOTE
Behavior   Note any precipitants to event or behavior   Describe level and action of any aggressive behavior or speech and associated interventions.     Anxiety: Patient denies at this time  Depression: Patient denies at this time  Pain  0  AVH   no  S/I   no  Plan  no  H/I   no  Plan  no    Affect   mood-congruent      Note: Patient sleeping when this nurse entered room. Patient denied SI, HI and AVH. Patient also denied anxiety and depression. Patient is cooperative and compliant with medications.      Intervention    PRN medication utilized:  no    Instructed in medication usage and effects  Medications administered as ordered  Encouraged to verbalize needs      Response    Verbalized understanding   Did patient take medications as ordered yes   Did patient interact with assessment?  yes     Plan    Will monitor for safety  Will monitor every 15 minutes as ordered  Will evaluate and promote the plan of care    Last BM:   (Please chart in I/O as well)

## 2023-09-08 NOTE — NURSING NOTE
Behavior   Note any precipitants to event or behavior   Describe level and action of any aggressive behavior or speech and associated interventions.     Anxiety: Excess Worry  Depression: Patient denies at this time  Pain  4  AVH   no  S/I   no  Plan  no  H/I   no  Plan  no    Affect   euthymic/normal      Note: Patient continues to c/o anxiety. However, patient denies SI/HI/AVH. Patient is noted interacting with peers appropriately. No new issues or concerns noted this shift.       Intervention    PRN medication utilized:  no    Instructed in medication usage and effects  Medications administered as ordered  Encouraged to verbalize needs      Response    Verbalized understanding   Did patient take medications as ordered yes   Did patient interact with assessment?  yes     Plan    Will monitor for safety  Will monitor every 15 minutes as ordered  Will evaluate and promote the plan of care    Last BM:  unknown date  (Please chart in I/O as well)

## 2023-09-08 NOTE — PLAN OF CARE
Goal Outcome Evaluation:  Plan of Care Reviewed With: patient  Patient Agreement with Plan of Care: agrees     Progress: improving  Outcome Evaluation: Patient has slept approximately 7 hours thus far during shift. Patient denied SI.

## 2023-09-08 NOTE — PROGRESS NOTES
"Anticoagulation by Pharmacy - Warfarin    Wing Hickman is a 53 y.o.female being continued on warfarin for atrial fibrillation    Home regimen: Warfarin 6 mg PO MonWedFriSun and warfarin 3 mg TueThuSat  INR Goal: 2-3  Last INR:   Lab Results   Component Value Date    INR 1.86 (H) 09/08/2023       Objective:  [Ht: 157.5 cm (62.01\"); Wt: 80.7 kg (178 lb)]  Lab Results   Component Value Date    INR 1.86 (H) 09/08/2023    INR 1.67 (H) 09/06/2023    INR 1.49 (H) 09/04/2023    PROTIME 21.1 (H) 09/08/2023    PROTIME 19.5 (H) 09/06/2023    PROTIME 17.9 (H) 09/04/2023     Lab Results   Component Value Date    HGB 13.7 08/26/2023    HGB 12.6 08/23/2023    HGB 13.0 08/22/2023    HCT 41.6 08/26/2023    HCT 39.1 08/23/2023    HCT 40.1 08/22/2023     08/26/2023     08/23/2023     (L) 08/22/2023       Recent Warfarin Administrations       The 5 most recent administrations since 08/31/2023 are shown below each listed medication.    Warfarin Sodium         Order Dose Date Given     warfarin (COUMADIN) tablet 4 mg 4 mg 9/7/2023     warfarin (COUMADIN) tablet 4 mg 4 mg 09/06/2023     warfarin (COUMADIN) tablet 3 mg 3 mg 09/05/2023      3 mg 09/04/2023     warfarin (COUMADIN) tablet 2 mg 2 mg 09/03/2023      2 mg 09/02/2023                  Assessment  Interacting medications: amiodarone and venlafaxine can increase INR  INR ordered every other day - today is subtherapeutic at 1.86  --We are now beginning to see the effects of the first 4mg we gave 9/6 and could expect the INR to continue to rise.  No H&H or platelets today - will continue to monitor trends  No signs or symptoms of bleeding noted    Patient received warfarin 4 mg yesterday at 1842.     Plan:  1.  Give warfarin 2.5 mg tablet PO @ 1800 tonight *ordered*  2.  Draw a PT/INR in two days 9/10 *ordered*  3.  Pharmacy will continue to follow    Jodie BurkettD  PGY1 Pharmacy Resident @20  9/8/2023 at 1145  "

## 2023-09-09 RX ORDER — POLYETHYLENE GLYCOL 3350 17 G/17G
17 POWDER, FOR SOLUTION ORAL DAILY
Status: DISCONTINUED | OUTPATIENT
Start: 2023-09-09 | End: 2023-09-11 | Stop reason: HOSPADM

## 2023-09-09 RX ADMIN — WARFARIN SODIUM 2.5 MG: 2.5 TABLET ORAL at 17:33

## 2023-09-09 RX ADMIN — LURASIDONE HYDROCHLORIDE 60 MG: 40 TABLET, FILM COATED ORAL at 17:33

## 2023-09-09 RX ADMIN — PANTOPRAZOLE SODIUM 40 MG: 40 TABLET, DELAYED RELEASE ORAL at 08:08

## 2023-09-09 RX ADMIN — PREGABALIN 25 MG: 25 CAPSULE ORAL at 20:18

## 2023-09-09 RX ADMIN — HYDROXYZINE PAMOATE 50 MG: 50 CAPSULE ORAL at 20:18

## 2023-09-09 RX ADMIN — BUPROPION HYDROCHLORIDE 100 MG: 100 TABLET, FILM COATED, EXTENDED RELEASE ORAL at 08:08

## 2023-09-09 RX ADMIN — DEXTROMETHORPHAN 45 MG: 30 SUSPENSION, EXTENDED RELEASE ORAL at 08:08

## 2023-09-09 RX ADMIN — PREGABALIN 25 MG: 25 CAPSULE ORAL at 08:08

## 2023-09-09 RX ADMIN — VENLAFAXINE HYDROCHLORIDE 150 MG: 75 CAPSULE, EXTENDED RELEASE ORAL at 08:08

## 2023-09-09 RX ADMIN — DEXTROMETHORPHAN 45 MG: 30 SUSPENSION, EXTENDED RELEASE ORAL at 20:19

## 2023-09-09 RX ADMIN — POLYETHYLENE GLYCOL 3350 17 G: 17 POWDER, FOR SOLUTION ORAL at 13:39

## 2023-09-09 RX ADMIN — AMIODARONE HYDROCHLORIDE 200 MG: 200 TABLET ORAL at 08:08

## 2023-09-09 RX ADMIN — CLONAZEPAM 0.25 MG: 0.5 TABLET ORAL at 20:18

## 2023-09-09 RX ADMIN — Medication 10 MG: at 08:43

## 2023-09-09 RX ADMIN — PRAZOSIN HYDROCHLORIDE 3 MG: 1 CAPSULE ORAL at 20:18

## 2023-09-09 RX ADMIN — HYDROXYZINE PAMOATE 50 MG: 50 CAPSULE ORAL at 11:17

## 2023-09-09 NOTE — NURSING NOTE
Behavior   Note any precipitants to event or behavior   Describe level and action of any aggressive behavior or speech and associated interventions.     Anxiety: Excess Worry, Restless/Edgy, and Decreased concentration  Depression: difficulty concentrating  Pain  0  AVH   no  S/I   no  Plan  no  H/I   no  Plan  no    Affect   flat      Note: Patient up in common area during conversation. Patient voices increased anxiety this morning and not wanting to go back home next week. Patient voices she slept well with the light on last night. Patient denies depression, SI, HI and AVH. Patient took medications as prescribed and requested PRN Vistaril at this time.       Intervention    PRN medication utilized:  yes - Vistaril    Instructed in medication usage and effects  Medications administered as ordered  Encouraged to verbalize needs      Response    Verbalized understanding   Did patient take medications as ordered yes   Did patient interact with assessment?  yes     Plan    Will monitor for safety  Will monitor every 15 minutes as ordered  Will evaluate and promote the plan of care    Last BM:  unknown date  (Please chart in I/O as well)

## 2023-09-09 NOTE — PLAN OF CARE
Goal Outcome Evaluation:     Patient Agreement with Plan of Care: agrees     Progress: improving  Outcome Evaluation: Patient complained of anxiety but overall much improved.  She calmed down as the evening went on.  She had prn vistaril.  She has slept 6 hours so far this shift.

## 2023-09-09 NOTE — NURSING NOTE
"Behavior   Note any precipitants to event or behavior   Describe level and action of any aggressive behavior or speech and associated interventions.     Anxiety: Excess Worry and Restless/Edgy  Depression: depressed mood and difficulty concentrating  Pain  2  AVH   no  S/I   no  Plan  no  H/I   no  Plan  no    Affect   flat      Note:  Patient is complaining of increased anxiety today. She states \"it hasn't been the best day I've had.\"  She took her meds as ordered and was given prn vistaril.  Patient did attend group and was in the common area playing cards with her peers.        Intervention    PRN medication utilized:  yes - vistaril    Instructed in medication usage and effects  Medications administered as ordered  Encouraged to verbalize needs      Response    Verbalized understanding   Did patient take medications as ordered yes   Did patient interact with assessment?  yes     Plan    Will monitor for safety  Will monitor every 15 minutes as ordered  Will evaluate and promote the plan of care    Last BM:  unknown date  (Please chart in I/O as well)'  "

## 2023-09-09 NOTE — PROGRESS NOTES
"Anticoagulation by Pharmacy - Warfarin    Wing Hickman is a 53 y.o.female who has been consulted for warfarin for atrial fibrillation.     Home regimen: Warfarin 6 mg PO MWFSun and 3 mg TTSat  INR Goal: 2-3    Objective:  [Ht: 157.5 cm (62.01\"); Wt: 80.7 kg (178 lb)]    Lab Results   Component Value Date    INR 1.86 (H) 09/08/2023    INR 1.67 (H) 09/06/2023    INR 1.49 (H) 09/04/2023    PROTIME 21.1 (H) 09/08/2023    PROTIME 19.5 (H) 09/06/2023    PROTIME 17.9 (H) 09/04/2023     Lab Results   Component Value Date    HGB 13.7 08/26/2023    HGB 12.6 08/23/2023    HGB 13.0 08/22/2023    HCT 41.6 08/26/2023    HCT 39.1 08/23/2023    HCT 40.1 08/22/2023     08/26/2023     08/23/2023     (L) 08/22/2023       Recent Warfarin Administrations       The 5 most recent administrations since 08/19/2023 are shown below each listed medication.    Warfarin Sodium         Order Dose Date Given     warfarin (COUMADIN) tablet 3 mg 3 mg 08/25/2023     warfarin (COUMADIN) tablet 6 mg 6 mg 08/21/2023                    Assessment  No signs or symptoms of bleeding noted.   Interacting medications: amiodarone, venlafaxine   INR is subtherapeutic but is trending up. INR recheck is tomorrow. Dose adjusted yesterday and will continue.     Plan:  Give warfarin 2.5 mg PO @ 1800 tonight   PT/INR ordered every other day.   Pharmacy will continue to follow    Thank you for this consult.     Ciro Medley [facility]  09/09/23 12:36 CDT     "

## 2023-09-09 NOTE — PLAN OF CARE
Goal Outcome Evaluation:  Plan of Care Reviewed With: patient  Patient Agreement with Plan of Care: agrees     Progress: improving  Outcome Evaluation: Patient had increased anxiety earlier in shift and requested vistaril. Patient voices improvement after medications administration and voices no concerns or complaints at this time.

## 2023-09-09 NOTE — PROGRESS NOTES
"9/9/2023    Chief Complaint: suicidal ideation, anxiety, and depression    Subjective:  Patient is a 53 y.o. female that is currently inpt on the adult U Today she is seen in the [facility] area. Pt states that she is very anxious about returning home alone She states she is scared. She says what if she goes in and just locks the door and stays away from people. She states that her dog has ran away and that she worries about not having any \"security\" when she is home.   Pt is engaged with peers. She does state being around others is helpful.   Pt denies SI/HI/AVH.      Objective     Vital Signs    Temp:  [99 °F (37.2 °C)-99.1 °F (37.3 °C)] 99 °F (37.2 °C)  Heart Rate:  [67-70] 70  Resp:  [18-20] 20  BP: (117-136)/(55-70) 117/70    Physical Exam:   General Appearance: alert, appears stated age, and cooperative,  Hygiene:   fair  Gait & Station: Normal  Musculoskeletal: No tremors or abnormal involuntary movements    Mental Status Exam:   Cooperation:  Cooperative  Eye Contact:  Fair  Psychomotor Behavior:  Appropriate  Mood: Anxious/Nervous and Worried  Affect:  mood-congruent  Speech:  Normal  Thought Process:  Appropriately abstract  Associations: Goal Directed  Thought Content:     Mood congruent   Suicidal:  None   Homicidal:  None   Hallucinations:  None   Delusion:  None  Cognitive Functioning:   Consciousness: awake and alert  Reliability:  fair  Insight:  Fair  Judgement:  Fair  Impulse Control:  Fair    Lab Results (last 24 hours)       ** No results found for the last 24 hours. **          Imaging Results (Last 24 Hours)       ** No results found for the last 24 hours. **            Medicine:   Current Facility-Administered Medications:     acetaminophen (TYLENOL) tablet 650 mg, 650 mg, Oral, Q4H PRN, Curtis Maria II, MD, 650 mg at 09/08/23 0440    aluminum-magnesium hydroxide-simethicone (MAALOX MAX) 400-400-40 MG/5ML suspension 15 mL, 15 mL, Oral, Q6H PRN, Curtis Maria II, MD    amiodarone " (PACERONE) tablet 200 mg, 200 mg, Oral, Daily, Curtis Maria II, MD, 200 mg at 09/09/23 0808    buPROPion SR (WELLBUTRIN SR) 12 hr tablet 100 mg, 100 mg, Oral, Daily, 100 mg at 09/09/23 0808 **AND** dextromethorphan polistirex ER (DELSYM) 30 MG/5ML oral suspension 45 mg, 45 mg, Oral, Q12H, Curtis Maria II, MD, 45 mg at 09/09/23 0808    clonazePAM (KlonoPIN) tablet 0.25 mg, 0.25 mg, Oral, Nightly, Marcela Manriquez MD, 0.25 mg at 09/08/23 2036    cloNIDine (CATAPRES) tablet 0.1 mg, 0.1 mg, Oral, Q4H PRN, Curtis Maria II, MD    furosemide (LASIX) half tablet 10 mg, 10 mg, Oral, Daily, Lillie Lopez PA-C, 10 mg at 09/09/23 0843    Hydrocortisone (Perianal) (ANUSOL-HC) 2.5 % rectal cream, , Rectal, BID PRN, Curtis Maria II, MD    hydrOXYzine pamoate (VISTARIL) capsule 50 mg, 50 mg, Oral, Q6H PRN, Curtis Maria II, MD, 50 mg at 09/09/23 1117    loperamide (IMODIUM) capsule 2 mg, 2 mg, Oral, Q2H PRN, Curtis Maria II, MD    Lurasidone HCl (LATUDA) tablet 60 mg, 60 mg, Oral, Daily With Dinner, Curtis Maria II, MD, 60 mg at 09/08/23 1725    magnesium hydroxide (MILK OF MAGNESIA) suspension 10 mL, 10 mL, Oral, Daily PRN, Curtis Maria II, MD, 10 mL at 09/08/23 1730    ondansetron ODT (ZOFRAN-ODT) disintegrating tablet 4 mg, 4 mg, Oral, Q6H PRN, Curtis Maria II, MD, 4 mg at 09/06/23 1225    pantoprazole (PROTONIX) EC tablet 40 mg, 40 mg, Oral, Daily, Curtis Maria II, MD, 40 mg at 09/09/23 0808    Pharmacy to dose warfarin, , Does not apply, Continuous PRN, Omaira Willoughby G, APRN    polyethylene glycol (MIRALAX) packet 17 g, 17 g, Oral, Daily, Curtis Maria II, MD    prazosin (MINIPRESS) capsule 3 mg, 3 mg, Oral, Nightly, Curtis Maria II, MD, 3 mg at 09/08/23 2036    pregabalin (LYRICA) capsule 25 mg, 25 mg, Oral, BID, Curtis Maria II, MD, 25 mg at 09/09/23 0808    venlafaxine XR (EFFEXOR-XR) 24 hr capsule 150  mg, 150 mg, Oral, Daily With Breakfast, Marcela Manriquez MD, 150 mg at 09/09/23 0808    warfarin (COUMADIN) tablet 2.5 mg, 2.5 mg, Oral, Daily, Omaira Willoughby APRN, 2.5 mg at 09/08/23 1725    Diagnoses/Assessment:     Suicidal ideation    Bipolar I disorder, most recent episode depressed, severe without psychotic features    Bereavement    Post traumatic stress disorder (PTSD)    Problems of guilt    Nihilism    Chronic atrial fibrillation    GERD (gastroesophageal reflux disease)    Ineffective coping      Treatment Plan:    1) Will continue care for the patient on the behavioral health unit at [facility] to ensure patient safety.  2) Will continue to provide treatment with the unit milieu, activities, therapies and psychopharmacological management.  3) Patient to be placed on or continued on  Q15 minute checks  and Suicide precautions.  4) Pertinent medical issues:   -Atrial fibrillation, chronic: Continue home amiodarone and Coumadin, pharmacy to dose. Paced.  -GERD: Continue PPI.  -ANANDA resolved  -Chronic pain/neuorapthy: cont Lyrica 25mg BID  -Edema: Lasix 10mg daily.  Complaint of leg cramps.  Review of  BMP results: K+ 4.2  5) Will order following labs: none  6) Will make the following medication changes:   -Cont Latuda 60mg qDinner   --Cont Prazosin 2 mg qhs  --Cont Klonopin 0.25mg qhs and consider increase as appropriate  --Cont Effexor XR 150mg daily with breakfast   --Ketamine infusions:              --First infusion, 7/26/23 @ 0.4mg/kg over 45 min; Pre MADRS 51  --Second, 7/28/23 @ 0.45 mg/kg over 45 min; Pre MADRS 38  --Third, 9/1/23 @ 0.35mg/kg at 150ml/hr (about 45 min)  --Fourth, 9/3/23 @ 0.4mg/kg.at 150ml/hr (about 45 min)  --Fifth 9/5/23 @ 0.4mg/kg at 150ml/hr (about 45 min)  --Sixth infusion on 9/7/23 @ 0.4mg/kg over ~50 min     --Cont  Auvelity today with Wellbutrin SR 100mg + Dextromethorphan 45mg qAM        7) Will continue discharge planning as appropriate for patient.  8)  Psychotherapy provided for less than 16 minutes.    Treatment plan and medication risks and benefits discussed with: Patient    DESIREE Voss  09/09/23  13:32 CDT

## 2023-09-10 LAB
INR PPP: 1.85 (ref 0.8–1.2)
PROTHROMBIN TIME: 21 SECONDS (ref 11.1–15.3)

## 2023-09-10 PROCEDURE — 85610 PROTHROMBIN TIME: CPT | Performed by: NURSE PRACTITIONER

## 2023-09-10 RX ORDER — WARFARIN SODIUM 3 MG/1
3 TABLET ORAL
Status: DISCONTINUED | OUTPATIENT
Start: 2023-09-10 | End: 2023-09-11 | Stop reason: HOSPADM

## 2023-09-10 RX ORDER — DEXTROMETHORPHAN POLISTIREX 30 MG/5ML
45 SUSPENSION ORAL EVERY 12 HOURS SCHEDULED
Status: DISCONTINUED | OUTPATIENT
Start: 2023-09-11 | End: 2023-09-11 | Stop reason: HOSPADM

## 2023-09-10 RX ORDER — BUPROPION HYDROCHLORIDE 150 MG/1
150 TABLET ORAL DAILY
Status: DISCONTINUED | OUTPATIENT
Start: 2023-09-11 | End: 2023-09-11 | Stop reason: HOSPADM

## 2023-09-10 RX ADMIN — WARFARIN SODIUM 3 MG: 3 TABLET ORAL at 17:26

## 2023-09-10 RX ADMIN — PANTOPRAZOLE SODIUM 40 MG: 40 TABLET, DELAYED RELEASE ORAL at 08:22

## 2023-09-10 RX ADMIN — PREGABALIN 25 MG: 25 CAPSULE ORAL at 20:24

## 2023-09-10 RX ADMIN — HYDROXYZINE PAMOATE 50 MG: 50 CAPSULE ORAL at 20:24

## 2023-09-10 RX ADMIN — AMIODARONE HYDROCHLORIDE 200 MG: 200 TABLET ORAL at 08:22

## 2023-09-10 RX ADMIN — POLYETHYLENE GLYCOL 3350 17 G: 17 POWDER, FOR SOLUTION ORAL at 08:22

## 2023-09-10 RX ADMIN — BUPROPION HYDROCHLORIDE 100 MG: 100 TABLET, FILM COATED, EXTENDED RELEASE ORAL at 08:22

## 2023-09-10 RX ADMIN — PREGABALIN 25 MG: 25 CAPSULE ORAL at 08:22

## 2023-09-10 RX ADMIN — DEXTROMETHORPHAN 45 MG: 30 SUSPENSION, EXTENDED RELEASE ORAL at 08:22

## 2023-09-10 RX ADMIN — VENLAFAXINE HYDROCHLORIDE 150 MG: 75 CAPSULE, EXTENDED RELEASE ORAL at 08:22

## 2023-09-10 RX ADMIN — LURASIDONE HYDROCHLORIDE 60 MG: 40 TABLET, FILM COATED ORAL at 17:25

## 2023-09-10 RX ADMIN — PRAZOSIN HYDROCHLORIDE 3 MG: 1 CAPSULE ORAL at 20:24

## 2023-09-10 RX ADMIN — CLONAZEPAM 0.25 MG: 0.5 TABLET ORAL at 20:24

## 2023-09-10 RX ADMIN — HYDROXYZINE PAMOATE 50 MG: 50 CAPSULE ORAL at 12:40

## 2023-09-10 RX ADMIN — DEXTROMETHORPHAN 45 MG: 30 SUSPENSION, EXTENDED RELEASE ORAL at 20:24

## 2023-09-10 RX ADMIN — Medication 10 MG: at 08:22

## 2023-09-10 NOTE — NURSING NOTE
"Behavior   Note any precipitants to event or behavior   Describe level and action of any aggressive behavior or speech and associated interventions.     Anxiety: Excess Worry and Restless/Edgy  Depression: Patient denies at this time  Pain  0  AVH   no  S/I   no  Plan  no  H/I   no  Plan  no    Affect   flat      Note: Patient resting in bed during conversation. Patient voices anxiety and states \"about going home to my apartment alone and no security\". Patient denies depression, AVH, SI and HI. Patient took medications as prescribed and requested PRN Vistaril due to anxiety. Patient voices no other concerns or complaints at this time.       Intervention    PRN medication utilized:  yes - Vistaril    Instructed in medication usage and effects  Medications administered as ordered  Encouraged to verbalize needs      Response    Verbalized understanding   Did patient take medications as ordered yes   Did patient interact with assessment?  yes     Plan    Will monitor for safety  Will monitor every 15 minutes as ordered  Will evaluate and promote the plan of care    Last BM:  unknown date  (Please chart in I/O as well)      "

## 2023-09-10 NOTE — PROGRESS NOTES
9/10/2023    Chief Complaint: anxiety and depression    Subjective:  Patient is a 53 y.o. female that is currently inpt on the adult BHU today she is seen individually. Pt reports anxiety related to discharge and going home alone. She states that she is scared.  Pt reports that she has little support, but does have [employer] family and the [facility] has offered to transport her home at discharge.   Pt is encouraged to not lay in bed all day, to get out among others and get mind off of negative thoughts and to write down some goals she has for herself.   Pt denies SI/HI/AVH, she is compliant with medications.     Objective     Vital Signs    Temp:  [96.8 °F (36 °C)-97.9 °F (36.6 °C)] 96.8 °F (36 °C)  Heart Rate:  [62-80] 80  Resp:  [18-20] 20  BP: (112-123)/(61-66) 112/66    Physical Exam:   General Appearance: alert, appears stated age, and cooperative,  Hygiene:   fair  Gait & Station: Normal  Musculoskeletal: No tremors or abnormal involuntary movements    Mental Status Exam:   Cooperation:  Cooperative  Eye Contact:  Fair  Psychomotor Behavior:  Appropriate  Mood: Anxious/Nervous  Affect:  mood-congruent  Speech:  Normal  Thought Process:  Coherent  Associations: Goal Directed  Thought Content:     Mood congruent   Suicidal:  None   Homicidal:  None   Hallucinations:  None   Delusion:  None  Cognitive Functioning:   Consciousness: awake and alert  Reliability:  good  Insight:  Fair  Judgement:  Good  Impulse Control:  Fair    Lab Results (last 24 hours)       Procedure Component Value Units Date/Time    Protime-INR [[id number]]  (Abnormal) Collected: 09/10/23 0557    Specimen: Blood Updated: 09/10/23 0821     Protime 21.0 Seconds      INR 1.85    Narrative:      Therapeutic range for most indications is 2.0-3.0 INR,  or 2.5-3.5 for mechanical heart valves.          Imaging Results (Last 24 Hours)       ** No results found for the last 24 hours. **            Medicine:   Current Facility-Administered Medications:      acetaminophen (TYLENOL) tablet 650 mg, 650 mg, Oral, Q4H PRN, Curtis Maria II, MD, 650 mg at 09/08/23 0440    aluminum-magnesium hydroxide-simethicone (MAALOX MAX) 400-400-40 MG/5ML suspension 15 mL, 15 mL, Oral, Q6H PRN, Curtis Maria II, MD    amiodarone (PACERONE) tablet 200 mg, 200 mg, Oral, Daily, Curtis Maria II, MD, 200 mg at 09/10/23 0822    buPROPion SR (WELLBUTRIN SR) 12 hr tablet 100 mg, 100 mg, Oral, Daily, 100 mg at 09/10/23 0822 **AND** dextromethorphan polistirex ER (DELSYM) 30 MG/5ML oral suspension 45 mg, 45 mg, Oral, Q12H, Curtis Maria II, MD, 45 mg at 09/10/23 0822    clonazePAM (KlonoPIN) tablet 0.25 mg, 0.25 mg, Oral, Nightly, Marcela Manriquez MD, 0.25 mg at 09/09/23 2018    cloNIDine (CATAPRES) tablet 0.1 mg, 0.1 mg, Oral, Q4H PRN, Curtis Maria II, MD    furosemide (LASIX) half tablet 10 mg, 10 mg, Oral, Daily, Lillie Lopez PA-C, 10 mg at 09/10/23 0822    Hydrocortisone (Perianal) (ANUSOL-HC) 2.5 % rectal cream, , Rectal, BID PRN, Curtis Maria II, MD    hydrOXYzine pamoate (VISTARIL) capsule 50 mg, 50 mg, Oral, Q6H PRN, Curtis Maria II, MD, 50 mg at 09/10/23 1240    loperamide (IMODIUM) capsule 2 mg, 2 mg, Oral, Q2H PRN, Curtis Maria II, MD    Lurasidone HCl (LATUDA) tablet 60 mg, 60 mg, Oral, Daily With Dinner, Curtis Maria II, MD, 60 mg at 09/09/23 1733    magnesium hydroxide (MILK OF MAGNESIA) suspension 10 mL, 10 mL, Oral, Daily PRN, Curtis Maria II, MD, 10 mL at 09/08/23 1730    ondansetron ODT (ZOFRAN-ODT) disintegrating tablet 4 mg, 4 mg, Oral, Q6H PRN, Curtis Maria II, MD, 4 mg at 09/06/23 1225    pantoprazole (PROTONIX) EC tablet 40 mg, 40 mg, Oral, Daily, Curtis Maria II, MD, 40 mg at 09/10/23 0822    Pharmacy to dose warfarin, , Does not apply, Continuous PRN, Omaira Willoughby G, APRN    polyethylene glycol (MIRALAX) packet 17 g, 17 g, Oral, Daily, Curtis Maria II,  MD, 17 g at 09/10/23 0822    prazosin (MINIPRESS) capsule 3 mg, 3 mg, Oral, Nightly, Curtis Maria II, MD, 3 mg at 09/09/23 2018    pregabalin (LYRICA) capsule 25 mg, 25 mg, Oral, BID, Curtis Maria II, MD, 25 mg at 09/10/23 0822    venlafaxine XR (EFFEXOR-XR) 24 hr capsule 150 mg, 150 mg, Oral, Daily With Breakfast, Marcela Manriquez MD, 150 mg at 09/10/23 0822    warfarin (COUMADIN) tablet 3 mg, 3 mg, Oral, Daily, Marcela Manriquez MD    Diagnoses/Assessment:     Suicidal ideation    Bipolar I disorder, most recent episode depressed, severe without psychotic features    Bereavement    Post traumatic stress disorder (PTSD)    Problems of guilt    Nihilism    Chronic atrial fibrillation    GERD (gastroesophageal reflux disease)    Ineffective coping      Treatment Plan:    1) Will continue care for the patient on the behavioral health unit at [facility] to ensure patient safety.  2) Will continue to provide treatment with the unit milieu, activities, therapies and psychopharmacological management.  3) Patient to be placed on or continued on  Q15 minute checks  and Suicide precautions.  4) Pertinent medical issues:   -Atrial fibrillation, chronic: Continue home amiodarone and Coumadin, pharmacy to dose. Paced.  -GERD: Continue PPI.  -ANANDA resolved  -Chronic pain/neuorapthy: cont Lyrica 25mg BID  -Edema: Lasix 10mg daily.  Complaint of leg cramps.  Review of  BMP results: K+ 4.2  5) Will order following labs: none  6) Will make the following medication changes:   -Cont Latuda 60mg qDinner   --Cont Prazosin 2 mg qhs  --Cont Klonopin 0.25mg qhs and consider increase as appropriate  --Cont Effexor XR 150mg daily with breakfast   --Ketamine infusions:              --First infusion, 7/26/23 @ 0.4mg/kg over 45 min; Pre MADRS 51  --Second, 7/28/23 @ 0.45 mg/kg over 45 min; Pre MADRS 38  --Third, 9/1/23 @ 0.35mg/kg at 150ml/hr (about 45 min)  --Fourth, 9/3/23 @ 0.4mg/kg.at 150ml/hr (about  45 min)  --Fifth 9/5/23 @ 0.4mg/kg at 150ml/hr (about 45 min)  --Sixth infusion on 9/7/23 @ 0.4mg/kg over ~50 min     --Cont  Auvelity today with Wellbutrin SR 100mg + Dextromethorphan 45mg qAM  7) Will continue discharge planning as appropriate for patient.  8) Psychotherapy provided for less than 16 minutes.    Treatment plan and medication risks and benefits discussed with: Patient    Tanesha Stratton, DESIREE  09/10/23  13:39 CDT

## 2023-09-10 NOTE — PROGRESS NOTES
"Anticoagulation by Pharmacy - Warfarin    Wing Hickman is a 53 y.o.female who has been consulted for warfarin for atrial fibrillation.     Home regimen: Warfarin 6 mg PO MWFSun and 3 mg TTSat  INR Goal: 2-3    Objective:  [Ht: 157.5 cm (62.01\"); Wt: 80.7 kg (178 lb)]    Lab Results   Component Value Date    INR 1.85 (H) 09/10/2023    INR 1.86 (H) 09/08/2023    INR 1.67 (H) 09/06/2023    PROTIME 21.0 (H) 09/10/2023    PROTIME 21.1 (H) 09/08/2023    PROTIME 19.5 (H) 09/06/2023     Lab Results   Component Value Date    HGB 13.7 08/26/2023    HGB 12.6 08/23/2023    HGB 13.0 08/22/2023    HCT 41.6 08/26/2023    HCT 39.1 08/23/2023    HCT 40.1 08/22/2023     08/26/2023     08/23/2023     (L) 08/22/2023       Recent Warfarin Administrations       The 5 most recent administrations since 09/03/2023 are shown below each listed medication.    Warfarin Sodium         Order Dose Date Given     warfarin (COUMADIN) tablet 2.5 mg 2.5 mg 09/09/2023      2.5 mg 09/08/2023     warfarin (COUMADIN) tablet 4 mg 4 mg 09/07/2023      4 mg 09/06/2023     warfarin (COUMADIN) tablet 3 mg 3 mg 09/05/2023                      Assessment  No signs or symptoms of bleeding noted.   Interacting medications: amiodarone, venlafaxine   INR is subtherapeutic. No change today. Will slightly increase the dose for tonight.    Plan:  Give warfarin 3 mg PO @ 1800 tonight   PT/INR ordered every other day.   Pharmacy will continue to follow    Thank you for this consult.     Ciro Medley [facility]  09/10/23 10:52 CDT     "

## 2023-09-10 NOTE — NURSING NOTE
Behavior   Note any precipitants to event or behavior   Describe level and action of any aggressive behavior or speech and associated interventions.     Anxiety: Excess Worry, Restless/Edgy, and Decreased concentration  Depression: difficulty concentrating  Pain  0  AVH   no  S/I   no  Plan  no  H/I   no  Plan  no    Affect   blunted      Note:  Patient has been extremely anxious and tearful so far this shift.  She has been in bed crying stating she does not know what will happen when she goes home.  She states she doesn't want to be by herself in her apartment.  She took her medications as ordered.        Intervention    PRN medication utilized:  yes - vistaril    Instructed in medication usage and effects  Medications administered as ordered  Encouraged to verbalize needs      Response    Verbalized understanding   Did patient take medications as ordered yes   Did patient interact with assessment?  yes     Plan    Will monitor for safety  Will monitor every 15 minutes as ordered  Will evaluate and promote the plan of care    Last BM:  unknown date  (Please chart in I/O as well)

## 2023-09-10 NOTE — PLAN OF CARE
Goal Outcome Evaluation:  Plan of Care Reviewed With: patient  Patient Agreement with Plan of Care: agrees     Progress: no change  Outcome Evaluation: Patient has voiced anxiety this shift. Patient denies depression, AVH, SI and HI. Patient has been out interacting with staff and peers this shift and voices no complaints or concerns at this time.

## 2023-09-10 NOTE — PLAN OF CARE
Goal Outcome Evaluation:     Patient Agreement with Plan of Care: agrees     Progress: no change  Outcome Evaluation: Patient has remained in her room nearly the entire shift.  She states she is isolating because of her anxiety about going home.  She was tearful explaining how scared she is to go to her apartment by herself.  She was given prn vistaril for anxiety which was moderately effective. She has slept 6.25 hours so far this shift.

## 2023-09-11 VITALS
DIASTOLIC BLOOD PRESSURE: 69 MMHG | HEIGHT: 62 IN | SYSTOLIC BLOOD PRESSURE: 125 MMHG | RESPIRATION RATE: 18 BRPM | OXYGEN SATURATION: 100 % | WEIGHT: 178 LBS | BODY MASS INDEX: 32.76 KG/M2 | HEART RATE: 60 BPM | TEMPERATURE: 98.9 F

## 2023-09-11 PROBLEM — R45.851 SUICIDAL IDEATION: Status: RESOLVED | Noted: 2023-08-23 | Resolved: 2023-09-11

## 2023-09-11 PROCEDURE — 63710000001 ONDANSETRON ODT 4 MG TABLET DISPERSIBLE: Performed by: PSYCHIATRY & NEUROLOGY

## 2023-09-11 RX ORDER — WARFARIN SODIUM 6 MG/1
TABLET ORAL
Start: 2023-09-11

## 2023-09-11 RX ORDER — LURASIDONE HYDROCHLORIDE 60 MG/1
60 TABLET, FILM COATED ORAL
Qty: 30 TABLET | Refills: 1 | Status: SHIPPED | OUTPATIENT
Start: 2023-09-11

## 2023-09-11 RX ORDER — POLYETHYLENE GLYCOL 3350 17 G/17G
17 POWDER, FOR SOLUTION ORAL DAILY
Qty: 30 PACKET | Refills: 0 | Status: SHIPPED | OUTPATIENT
Start: 2023-09-12

## 2023-09-11 RX ORDER — DEXTROMETHORPHAN HYDROBROMIDE, BUPROPION HYDROCHLORIDE 105; 45 MG/1; MG/1
1 TABLET, MULTILAYER, EXTENDED RELEASE ORAL 2 TIMES DAILY
Qty: 60 TABLET | Refills: 1 | Status: SHIPPED | OUTPATIENT
Start: 2023-09-11

## 2023-09-11 RX ORDER — VENLAFAXINE HYDROCHLORIDE 150 MG/1
150 CAPSULE, EXTENDED RELEASE ORAL
Qty: 30 CAPSULE | Refills: 1 | Status: SHIPPED | OUTPATIENT
Start: 2023-09-12

## 2023-09-11 RX ORDER — FUROSEMIDE 20 MG/1
10 TABLET ORAL DAILY
Qty: 15 TABLET | Refills: 0 | Status: SHIPPED | OUTPATIENT
Start: 2023-09-12

## 2023-09-11 RX ORDER — CLONAZEPAM 1 MG/1
TABLET ORAL
Start: 2023-09-11

## 2023-09-11 RX ORDER — PRAZOSIN HYDROCHLORIDE 1 MG/1
3 CAPSULE ORAL NIGHTLY
Qty: 90 CAPSULE | Refills: 1 | Status: SHIPPED | OUTPATIENT
Start: 2023-09-11

## 2023-09-11 RX ADMIN — PANTOPRAZOLE SODIUM 40 MG: 40 TABLET, DELAYED RELEASE ORAL at 08:08

## 2023-09-11 RX ADMIN — VENLAFAXINE HYDROCHLORIDE 150 MG: 75 CAPSULE, EXTENDED RELEASE ORAL at 08:08

## 2023-09-11 RX ADMIN — PREGABALIN 25 MG: 25 CAPSULE ORAL at 20:16

## 2023-09-11 RX ADMIN — WARFARIN SODIUM 3 MG: 3 TABLET ORAL at 17:08

## 2023-09-11 RX ADMIN — BUPROPION HYDROCHLORIDE 150 MG: 150 TABLET, EXTENDED RELEASE ORAL at 08:08

## 2023-09-11 RX ADMIN — PRAZOSIN HYDROCHLORIDE 3 MG: 1 CAPSULE ORAL at 20:16

## 2023-09-11 RX ADMIN — LURASIDONE HYDROCHLORIDE 60 MG: 40 TABLET, FILM COATED ORAL at 17:08

## 2023-09-11 RX ADMIN — AMIODARONE HYDROCHLORIDE 200 MG: 200 TABLET ORAL at 08:08

## 2023-09-11 RX ADMIN — DEXTROMETHORPHAN 45 MG: 30 SUSPENSION, EXTENDED RELEASE ORAL at 17:53

## 2023-09-11 RX ADMIN — POLYETHYLENE GLYCOL 3350 17 G: 17 POWDER, FOR SOLUTION ORAL at 08:08

## 2023-09-11 RX ADMIN — ONDANSETRON 4 MG: 4 TABLET, ORALLY DISINTEGRATING ORAL at 08:17

## 2023-09-11 RX ADMIN — PREGABALIN 25 MG: 25 CAPSULE ORAL at 08:08

## 2023-09-11 RX ADMIN — CLONAZEPAM 0.25 MG: 0.5 TABLET ORAL at 20:15

## 2023-09-11 RX ADMIN — DEXTROMETHORPHAN 45 MG: 30 SUSPENSION, EXTENDED RELEASE ORAL at 08:08

## 2023-09-11 RX ADMIN — Medication 10 MG: at 08:08

## 2023-09-11 NOTE — NURSING NOTE
Phoned [facility] for outpatient [facility] appointment and had to leave name and phone number for return call.  Patient notified of this and patient gave her phone number as [phone].

## 2023-09-11 NOTE — PROGRESS NOTES
"Discharge and Safety Planning    Met with pt and discussed discharge planning today. Pt denies SI\HI\AVH at this time.   Pt reports high anxiety surrounding d\c. Pt reports \"this is a safe environment.\"   Pt's plan for discharge is home with self care.   Pt will follow-up with  PCP, [facility] for [place] and [place] for after care and treatment. MSW discussed the importance of following up with mental health providers.   MSW and pt discussed the crisis line and provided pt with number to access. The number for the National Suicide & Crisis Hotline is 1-198.989.1716.  The National Crisis Text number is 188906.    Pt and MSW discussed learned coping skills from attending groups and ind sessions. Pt verbalized they would use the following coping skills in need, deep breathing, walking away, calling a friend, going to the ED, and using the crisis line. Assisted patient in identifying risk factors which would indicate the need for higher level of care including thoughts to harm self or others and/or self-harming behavior and encouraged patient to  call 911, or present to the nearest emergency room should any of these events occur. Discussed crisis intervention services and means to access.  Patient adamantly and convincingly denies current suicidal or homicidal ideation or perceptual disturbance.  Pt does not have access to fire arms/weapons or mauro, verified through safety planning.   Safety planning completed with patient this date.     Pt verbalized understanding of topics discussed. Pt had no further questions for this provider.   "

## 2023-09-11 NOTE — NURSING NOTE
[facility] returned call and appointment for [place] set up with Dr. Rm-patient notified and given this information in her discharge information.

## 2023-09-11 NOTE — PLAN OF CARE
Goal Outcome Evaluation:     Patient Agreement with Plan of Care: agrees     Progress: no change  Outcome Evaluation: Patient continues to have elevated anxiety about leaving.  She did say she is ready to go and face being home by herself.  She was out of her room interacting with peers and took her medictions as ordered.  She has slept 4.75 hours so far this shift.

## 2023-09-11 NOTE — PROGRESS NOTES
"Anticoagulation by Pharmacy - Warfarin    Wing Hickman is a 53 y.o.female being continued on warfarin for atrial fibrillation and valve replacement    Home regimen: Warfarin 3 mg TueThursSat; warfarin 6 mg all other days   INR Goal: 2-3  Last INR:   Lab Results   Component Value Date    INR 1.85 (H) 09/10/2023       Objective:  [Ht: 157.5 cm (62.01\"); Wt: 80.7 kg (178 lb)]  Lab Results   Component Value Date    INR 1.85 (H) 09/10/2023    INR 1.86 (H) 09/08/2023    INR 1.67 (H) 09/06/2023    PROTIME 21.0 (H) 09/10/2023    PROTIME 21.1 (H) 09/08/2023    PROTIME 19.5 (H) 09/06/2023     Lab Results   Component Value Date    HGB 13.7 08/26/2023    HGB 12.6 08/23/2023    HGB 13.0 08/22/2023    HCT 41.6 08/26/2023    HCT 39.1 08/23/2023    HCT 40.1 08/22/2023     08/26/2023     08/23/2023     (L) 08/22/2023       Recent Warfarin Administrations     The 5 most recent administrations since 09/04/2023 are shown below each listed medication.    Warfarin Sodium       Order Dose Date Given     warfarin (COUMADIN) tablet 3 mg 3 mg 09/10/2023     warfarin (COUMADIN) tablet 2.5 mg 2.5 mg 09/09/2023      2.5 mg 09/08/2023     warfarin (COUMADIN) tablet 4 mg 4 mg 09/07/2023      4 mg 09/06/2023                Assessment  Interacting medications: amiodarone, venlafaxine  No INR (ordered every other day), platelets, and H&H today. Will continue to monitor trends  No signs or symptoms of bleeding noted    Patient received warfarin 3 mg yesterday at 1726.    Plan:  1.  Give warfarin 3 mg tablet PO @ 1800 tonight  2.  Draw a PT/INR in AM  3.  Pharmacy will continue to follow    Pierre Amezquita [facility]  09/11/23 11:17 CDT   "

## 2023-09-11 NOTE — NURSING NOTE
Behavior   Note any precipitants to event or behavior   Describe level and action of any aggressive behavior or speech and associated interventions.     Anxiety: Excess Worry, Restless/Edgy, and Decreased concentration  Depression: difficulty concentrating  Pain  0  AVH   no  S/I   no  Plan  no  H/I   no  Plan  no    Affect   flat      Note:  Patient cooperative and voices anxiety.  She states she is finally feeling ready to go home and face being alone at her apartment.  She was given vistaril.  Will continue to monitor.       Intervention    PRN medication utilized:  yes - vistaril    Instructed in medication usage and effects  Medications administered as ordered  Encouraged to verbalize needs      Response    Verbalized understanding   Did patient take medications as ordered yes   Did patient interact with assessment?  yes     Plan    Will monitor for safety  Will monitor every 15 minutes as ordered  Will evaluate and promote the plan of care    Last BM:  unknown date  (Please chart in I/O as well)
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
## 2023-09-11 NOTE — PROGRESS NOTES
Called [employer], talked with Elizabeth. Confirmed transportation for this date. She reports this could be after 5pm before transport available.

## 2023-09-12 NOTE — NURSING NOTE
[facility]

## 2023-09-13 LAB
QT INTERVAL: 448 MS
QT INTERVAL: 490 MS
QT INTERVAL: 498 MS
QTC INTERVAL: 497 MS
QTC INTERVAL: 498 MS
QTC INTERVAL: 513 MS